# Patient Record
Sex: FEMALE | Race: BLACK OR AFRICAN AMERICAN | Employment: OTHER | URBAN - METROPOLITAN AREA
[De-identification: names, ages, dates, MRNs, and addresses within clinical notes are randomized per-mention and may not be internally consistent; named-entity substitution may affect disease eponyms.]

---

## 2019-01-03 ENCOUNTER — APPOINTMENT (OUTPATIENT)
Dept: GENERAL RADIOLOGY | Age: 69
DRG: 203 | End: 2019-01-03
Attending: EMERGENCY MEDICINE
Payer: COMMERCIAL

## 2019-01-03 ENCOUNTER — HOSPITAL ENCOUNTER (INPATIENT)
Age: 69
LOS: 3 days | Discharge: NURSING, REHAB OR LONG-TERM CARE | DRG: 203 | End: 2019-01-06
Attending: EMERGENCY MEDICINE | Admitting: NEUROMUSCULOSKELETAL MEDICINE & OMM
Payer: COMMERCIAL

## 2019-01-03 DIAGNOSIS — F14.10 COCAINE ABUSE (HCC): ICD-10-CM

## 2019-01-03 DIAGNOSIS — R07.89 ATYPICAL CHEST PAIN: Primary | ICD-10-CM

## 2019-01-03 PROBLEM — R07.9 CHEST PAIN: Status: ACTIVE | Noted: 2019-01-03

## 2019-01-03 LAB
AMPHET UR QL SCN: NEGATIVE
ANION GAP SERPL CALC-SCNC: 11 MMOL/L (ref 5–15)
ATRIAL RATE: 81 BPM
BARBITURATES UR QL SCN: NEGATIVE
BASOPHILS # BLD: 0 K/UL (ref 0–0.1)
BASOPHILS NFR BLD: 1 % (ref 0–1)
BENZODIAZ UR QL: NEGATIVE
BUN SERPL-MCNC: 16 MG/DL (ref 6–20)
BUN/CREAT SERPL: 17 (ref 12–20)
CALCIUM SERPL-MCNC: 9.3 MG/DL (ref 8.5–10.1)
CALCULATED P AXIS, ECG09: 72 DEGREES
CALCULATED R AXIS, ECG10: -6 DEGREES
CALCULATED T AXIS, ECG11: 52 DEGREES
CANNABINOIDS UR QL SCN: POSITIVE
CHLORIDE SERPL-SCNC: 106 MMOL/L (ref 97–108)
CHOLEST SERPL-MCNC: 264 MG/DL
CO2 SERPL-SCNC: 24 MMOL/L (ref 21–32)
COCAINE UR QL SCN: POSITIVE
CREAT SERPL-MCNC: 0.94 MG/DL (ref 0.55–1.02)
DIAGNOSIS, 93000: NORMAL
DIFFERENTIAL METHOD BLD: ABNORMAL
DRUG SCRN COMMENT,DRGCM: ABNORMAL
EOSINOPHIL # BLD: 0.2 K/UL (ref 0–0.4)
EOSINOPHIL NFR BLD: 4 % (ref 0–7)
ERYTHROCYTE [DISTWIDTH] IN BLOOD BY AUTOMATED COUNT: 14.6 % (ref 11.5–14.5)
ETHANOL SERPL-MCNC: <10 MG/DL
GLUCOSE SERPL-MCNC: 98 MG/DL (ref 65–100)
HCT VFR BLD AUTO: 34.3 % (ref 35–47)
HDLC SERPL-MCNC: 55 MG/DL
HDLC SERPL: 4.8 {RATIO} (ref 0–5)
HGB BLD-MCNC: 11 G/DL (ref 11.5–16)
IMM GRANULOCYTES # BLD: 0 K/UL (ref 0–0.04)
IMM GRANULOCYTES NFR BLD AUTO: 0 % (ref 0–0.5)
LDLC SERPL CALC-MCNC: 197.6 MG/DL (ref 0–100)
LIPID PROFILE,FLP: ABNORMAL
LYMPHOCYTES # BLD: 2.1 K/UL (ref 0.8–3.5)
LYMPHOCYTES NFR BLD: 36 % (ref 12–49)
MCH RBC QN AUTO: 23 PG (ref 26–34)
MCHC RBC AUTO-ENTMCNC: 32.1 G/DL (ref 30–36.5)
MCV RBC AUTO: 71.6 FL (ref 80–99)
METHADONE UR QL: NEGATIVE
MONOCYTES # BLD: 0.4 K/UL (ref 0–1)
MONOCYTES NFR BLD: 7 % (ref 5–13)
NEUTS SEG # BLD: 3 K/UL (ref 1.8–8)
NEUTS SEG NFR BLD: 53 % (ref 32–75)
NRBC # BLD: 0 K/UL (ref 0–0.01)
NRBC BLD-RTO: 0 PER 100 WBC
OPIATES UR QL: NEGATIVE
P-R INTERVAL, ECG05: 144 MS
PCP UR QL: NEGATIVE
PLATELET # BLD AUTO: 240 K/UL (ref 150–400)
PMV BLD AUTO: 9.5 FL (ref 8.9–12.9)
POTASSIUM SERPL-SCNC: 3 MMOL/L (ref 3.5–5.1)
Q-T INTERVAL, ECG07: 376 MS
QRS DURATION, ECG06: 74 MS
QTC CALCULATION (BEZET), ECG08: 436 MS
RBC # BLD AUTO: 4.79 M/UL (ref 3.8–5.2)
SODIUM SERPL-SCNC: 141 MMOL/L (ref 136–145)
TRIGL SERPL-MCNC: 57 MG/DL (ref ?–150)
TROPONIN I SERPL-MCNC: 0.07 NG/ML
VENTRICULAR RATE, ECG03: 81 BPM
VLDLC SERPL CALC-MCNC: 11.4 MG/DL
WBC # BLD AUTO: 5.8 K/UL (ref 3.6–11)

## 2019-01-03 PROCEDURE — 65660000000 HC RM CCU STEPDOWN

## 2019-01-03 PROCEDURE — 93005 ELECTROCARDIOGRAM TRACING: CPT

## 2019-01-03 PROCEDURE — 96374 THER/PROPH/DIAG INJ IV PUSH: CPT

## 2019-01-03 PROCEDURE — 74011250636 HC RX REV CODE- 250/636: Performed by: EMERGENCY MEDICINE

## 2019-01-03 PROCEDURE — 85025 COMPLETE CBC W/AUTO DIFF WBC: CPT

## 2019-01-03 PROCEDURE — 80061 LIPID PANEL: CPT

## 2019-01-03 PROCEDURE — 74011250636 HC RX REV CODE- 250/636: Performed by: NEUROMUSCULOSKELETAL MEDICINE & OMM

## 2019-01-03 PROCEDURE — 96361 HYDRATE IV INFUSION ADD-ON: CPT

## 2019-01-03 PROCEDURE — 71045 X-RAY EXAM CHEST 1 VIEW: CPT

## 2019-01-03 PROCEDURE — 36415 COLL VENOUS BLD VENIPUNCTURE: CPT

## 2019-01-03 PROCEDURE — 80307 DRUG TEST PRSMV CHEM ANLYZR: CPT

## 2019-01-03 PROCEDURE — 99284 EMERGENCY DEPT VISIT MOD MDM: CPT

## 2019-01-03 PROCEDURE — 74011250637 HC RX REV CODE- 250/637: Performed by: EMERGENCY MEDICINE

## 2019-01-03 PROCEDURE — 80048 BASIC METABOLIC PNL TOTAL CA: CPT

## 2019-01-03 PROCEDURE — 84484 ASSAY OF TROPONIN QUANT: CPT

## 2019-01-03 RX ORDER — LORAZEPAM 2 MG/ML
1 INJECTION INTRAMUSCULAR
Status: COMPLETED | OUTPATIENT
Start: 2019-01-03 | End: 2019-01-03

## 2019-01-03 RX ORDER — POTASSIUM CHLORIDE 1.5 G/1.77G
20 POWDER, FOR SOLUTION ORAL
Status: COMPLETED | OUTPATIENT
Start: 2019-01-03 | End: 2019-01-03

## 2019-01-03 RX ORDER — SODIUM CHLORIDE 0.9 % (FLUSH) 0.9 %
5-10 SYRINGE (ML) INJECTION AS NEEDED
Status: DISCONTINUED | OUTPATIENT
Start: 2019-01-03 | End: 2019-01-03 | Stop reason: SDUPTHER

## 2019-01-03 RX ORDER — HEPARIN SODIUM 5000 [USP'U]/ML
5000 INJECTION, SOLUTION INTRAVENOUS; SUBCUTANEOUS EVERY 12 HOURS
Status: DISCONTINUED | OUTPATIENT
Start: 2019-01-03 | End: 2019-01-06 | Stop reason: HOSPADM

## 2019-01-03 RX ORDER — ONDANSETRON 2 MG/ML
4 INJECTION INTRAMUSCULAR; INTRAVENOUS
Status: DISCONTINUED | OUTPATIENT
Start: 2019-01-03 | End: 2019-01-06 | Stop reason: HOSPADM

## 2019-01-03 RX ORDER — ENOXAPARIN SODIUM 100 MG/ML
40 INJECTION SUBCUTANEOUS EVERY 24 HOURS
Status: DISCONTINUED | OUTPATIENT
Start: 2019-01-03 | End: 2019-01-03 | Stop reason: ALTCHOICE

## 2019-01-03 RX ORDER — SODIUM CHLORIDE 0.9 % (FLUSH) 0.9 %
5-10 SYRINGE (ML) INJECTION EVERY 8 HOURS
Status: DISCONTINUED | OUTPATIENT
Start: 2019-01-03 | End: 2019-01-06 | Stop reason: HOSPADM

## 2019-01-03 RX ORDER — MAG HYDROX/ALUMINUM HYD/SIMETH 200-200-20
30 SUSPENSION, ORAL (FINAL DOSE FORM) ORAL
Status: DISCONTINUED | OUTPATIENT
Start: 2019-01-03 | End: 2019-01-06 | Stop reason: HOSPADM

## 2019-01-03 RX ORDER — SODIUM CHLORIDE 0.9 % (FLUSH) 0.9 %
5-10 SYRINGE (ML) INJECTION AS NEEDED
Status: DISCONTINUED | OUTPATIENT
Start: 2019-01-03 | End: 2019-01-06 | Stop reason: HOSPADM

## 2019-01-03 RX ORDER — GUAIFENESIN 100 MG/5ML
81 LIQUID (ML) ORAL DAILY
Status: DISCONTINUED | OUTPATIENT
Start: 2019-01-04 | End: 2019-01-06 | Stop reason: HOSPADM

## 2019-01-03 RX ORDER — MORPHINE SULFATE 4 MG/ML
4 INJECTION INTRAVENOUS
Status: DISCONTINUED | OUTPATIENT
Start: 2019-01-03 | End: 2019-01-06 | Stop reason: HOSPADM

## 2019-01-03 RX ORDER — SODIUM CHLORIDE 9 MG/ML
150 INJECTION, SOLUTION INTRAVENOUS CONTINUOUS
Status: DISCONTINUED | OUTPATIENT
Start: 2019-01-03 | End: 2019-01-05

## 2019-01-03 RX ORDER — ACETAMINOPHEN 325 MG/1
650 TABLET ORAL
Status: DISCONTINUED | OUTPATIENT
Start: 2019-01-03 | End: 2019-01-06 | Stop reason: HOSPADM

## 2019-01-03 RX ORDER — SODIUM CHLORIDE 0.9 % (FLUSH) 0.9 %
5-10 SYRINGE (ML) INJECTION EVERY 8 HOURS
Status: DISCONTINUED | OUTPATIENT
Start: 2019-01-03 | End: 2019-01-03 | Stop reason: SDUPTHER

## 2019-01-03 RX ADMIN — Medication 10 ML: at 20:48

## 2019-01-03 RX ADMIN — HEPARIN SODIUM 5000 UNITS: 5000 INJECTION INTRAVENOUS; SUBCUTANEOUS at 20:48

## 2019-01-03 RX ADMIN — LORAZEPAM 1 MG: 2 INJECTION, SOLUTION INTRAMUSCULAR; INTRAVENOUS at 12:42

## 2019-01-03 RX ADMIN — POTASSIUM CHLORIDE 20 MEQ: 1.5 POWDER, FOR SOLUTION ORAL at 13:22

## 2019-01-03 RX ADMIN — SODIUM CHLORIDE 150 ML/HR: 900 INJECTION, SOLUTION INTRAVENOUS at 17:42

## 2019-01-03 RX ADMIN — HEPARIN SODIUM 5000 UNITS: 5000 INJECTION INTRAVENOUS; SUBCUTANEOUS at 14:08

## 2019-01-03 RX ADMIN — SODIUM CHLORIDE 150 ML/HR: 900 INJECTION, SOLUTION INTRAVENOUS at 12:42

## 2019-01-03 NOTE — H&P
History and Physical 
 
Subjective:  
 
Manuela Pack is a 79 y.o.  female who presents with chest pain since 10 am this morning. Onset of symptoms was abrupt with unchanged course since that time. The pain is located left side of chest. Patient describes the pain as continuous and rated as moderate. Patient denies any sob. Symptoms are aggravated by koolaide. Symptoms improve with rest.  Previous studies include troponin - 0.07 Past Medical History:  
Diagnosis Date  Anxiety  Hypertension  Stroke ([facility] [address] 1970 History reviewed. No pertinent surgical history. History reviewed. No pertinent family history. Social History Tobacco Use  Smoking status: Current Every Day Smoker  Smokeless tobacco: Never Used Substance Use Topics  Alcohol use: Yes Frequency: Never Prior to Admission medications Medication Sig Start Date End Date Taking? Authorizing Provider  
aripiprazole (ABILIFY PO) Take  by mouth. Other, MD Ayala  
 
No Known Allergies Review of Systems: A comprehensive review of systems was negative except for that written in the History of Present Illness. Objective: Intake and Output:   
No intake/output data recorded. No intake/output data recorded. Physical Exam:  
Visit Vitals /78 (BP 1 Location: Right arm, BP Patient Position: At rest) Pulse 82 Temp 98.5 °F (36.9 °C) Resp 19 Ht 5' 5\" (1.651 m) Wt 54.4 kg (120 lb) SpO2 99% BMI 19.97 kg/m² General:  Alert, cooperative, falls asleep regularly Head:  Normocephalic, without obvious abnormality, atraumatic. Eyes:  Conjunctivae/corneas clear. PERRL, EOMs intact. Fundi benign. Ears:  Normal TMs and external ear canals both ears. Nose: Nares normal. Septum midline. Mucosa normal. No drainage or sinus tenderness.   
Throat: Lips, mucosa, and tongue normal. Teeth and gums normal.  
 Neck: Supple, symmetrical, trachea midline, no adenopathy, thyroid: no enlargement/tenderness/nodules, no carotid bruit and no JVD. Back:   Symmetric, no curvature. ROM normal. No CVA tenderness. Lungs:   Clear to auscultation bilaterally. Chest wall:  No tenderness or deformity. Heart:  Regular rate and rhythm, S1, S2 normal, no murmur, click, rub or gallop. Extremities: Extremities normal, atraumatic, no cyanosis or edema. Pulses: 2+ and symmetric all extremities. Skin: Skin color, texture, turgor normal. No rashes or lesions. Lymph nodes: Cervical, supraclavicular, and axillary nodes normal.  
Neurologic: CNII-XII intact. Normal strength, sensation and reflexes throughout. ECG:  Nsr, anteroseptal infarct Data Review:  
Recent Results (from the past 24 hour(s)) CBC WITH AUTOMATED DIFF Collection Time: 01/03/19 12:20 PM  
Result Value Ref Range WBC 5.8 3.6 - 11.0 K/uL  
 RBC 4.79 3.80 - 5.20 M/uL  
 HGB 11.0 (L) 11.5 - 16.0 g/dL HCT 34.3 (L) 35.0 - 47.0 % MCV 71.6 (L) 80.0 - 99.0 FL  
 MCH 23.0 (L) 26.0 - 34.0 PG  
 MCHC 32.1 30.0 - 36.5 g/dL  
 RDW 14.6 (H) 11.5 - 14.5 % PLATELET 985 205 - 171 K/uL MPV 9.5 8.9 - 12.9 FL  
 NRBC 0.0 0  WBC ABSOLUTE NRBC 0.00 0.00 - 0.01 K/uL NEUTROPHILS 53 32 - 75 % LYMPHOCYTES 36 12 - 49 % MONOCYTES 7 5 - 13 % EOSINOPHILS 4 0 - 7 % BASOPHILS 1 0 - 1 % IMMATURE GRANULOCYTES 0 0.0 - 0.5 % ABS. NEUTROPHILS 3.0 1.8 - 8.0 K/UL  
 ABS. LYMPHOCYTES 2.1 0.8 - 3.5 K/UL  
 ABS. MONOCYTES 0.4 0.0 - 1.0 K/UL  
 ABS. EOSINOPHILS 0.2 0.0 - 0.4 K/UL  
 ABS. BASOPHILS 0.0 0.0 - 0.1 K/UL  
 ABS. IMM. GRANS. 0.0 0.00 - 0.04 K/UL  
 DF AUTOMATED METABOLIC PANEL, BASIC Collection Time: 01/03/19 12:20 PM  
Result Value Ref Range Sodium 141 136 - 145 mmol/L Potassium 3.0 (L) 3.5 - 5.1 mmol/L Chloride 106 97 - 108 mmol/L  
 CO2 24 21 - 32 mmol/L  Anion gap 11 5 - 15 mmol/L  
 Glucose 98 65 - 100 mg/dL BUN 16 6 - 20 MG/DL Creatinine 0.94 0.55 - 1.02 MG/DL  
 BUN/Creatinine ratio 17 12 - 20 GFR est AA >60 >60 ml/min/1.73m2 GFR est non-AA 60 (L) >60 ml/min/1.73m2 Calcium 9.3 8.5 - 10.1 MG/DL  
ETHYL ALCOHOL Collection Time: 01/03/19 12:20 PM  
Result Value Ref Range ALCOHOL(ETHYL),SERUM <10 <10 MG/DL  
TROPONIN I Collection Time: 01/03/19 12:20 PM  
Result Value Ref Range Troponin-I, Qt. 0.07 (H) <0.05 ng/mL EKG, 12 LEAD, INITIAL Collection Time: 01/03/19 12:48 PM  
Result Value Ref Range Ventricular Rate 81 BPM  
 Atrial Rate 81 BPM  
 P-R Interval 144 ms QRS Duration 74 ms Q-T Interval 376 ms QTC Calculation (Bezet) 436 ms Calculated P Axis 72 degrees Calculated R Axis -6 degrees Calculated T Axis 52 degrees Diagnosis Normal sinus rhythm with sinus arrhythmia Anteroseptal infarct , age undetermined Abnormal ECG No previous ECGs available Chest x-ray was negative for infiltrate, effusion, pneumothorax, or wide mediastinum. Assessment:  
 
Active Problems: 
  Chest pain (1/3/2019) 
 
hypokalemia, ? Stroke hx ( no deficits) Plan:  
 
Check troponin in am. 
Check lipids, start on aspirin. Cardiology consult Cardiac diet. Give 40meq po kcl Full code Signed By: Court Richter DO   
 January 3, 2019

## 2019-01-03 NOTE — PROGRESS NOTES
CM reviewed chart. CM met with pt, spoke with pt boyfriend briefly, was celso to answer the basic questions. Pt sleeping- did not wake up to complete assessment. Boyfriend added his phone number [phone] [employer] [facility] MSW, [place]

## 2019-01-03 NOTE — ED NOTES
TRANSFER - OUT REPORT: 
 
Verbal report given to Remi Conklin RN(name) on [facility]  being transferred to [facility](unit) for routine progression of care Report consisted of patients Situation, Background, Assessment and  
Recommendations(SBAR). Information from the following report(s) SBAR was reviewed with the receiving nurse. Lines:  
Peripheral IV 01/03/19 Right Antecubital (Active) Site Assessment Clean, dry, & intact 1/3/2019 12:16 PM  
Phlebitis Assessment 0 1/3/2019 12:16 PM  
Infiltration Assessment 0 1/3/2019 12:16 PM  
Dressing Status Clean, dry, & intact 1/3/2019 12:16 PM  
Dressing Type Transparent 1/3/2019 12:16 PM  
Hub Color/Line Status Patent 1/3/2019 12:16 PM  
Action Taken Blood drawn 1/3/2019 12:16 PM  
  
 
Opportunity for questions and clarification was provided.    
 
Patient transported with: 
 Monitor and RN

## 2019-01-03 NOTE — PROGRESS NOTES
[facility] Dosing Services Automatic adjustment of enoxaparin Dr. Ziyad Dale Indication: VTE prophylaxis Wt Readings from Last 1 Encounters:  
01/03/19 54.4 kg (120 lb) Ht Readings from Last 1 Encounters:  
01/03/19 165.1 cm (65\") Pharmacist made change to enoxaparin therapy based on weight < 60 kg Order changed to heparin 5000 units SUBCUT every 12 hours. Previous Dose Enoxaparin 40m g SUBCUT every 24 hours Creatinine Clearance Estimated Creatinine Clearance: 50.6 mL/min (based on SCr of 0.94 mg/dL). Creatinine Lab Results Component Value Date/Time Creatinine 0.94 01/03/2019 12:20 PM  
   
Platelet Lab Results Component Value Date/Time PLATELET 476 86/32/7468 12:20 PM  
  
H/H Lab Results Component Value Date/Time HGB 11.0 (L) 01/03/2019 12:20 PM  
  
 
Epidural Catheter? No 
Other anticoagulants: No 
Relevant drug interactions: None ? Pharmacy to automatically make dose adjustment for renal dysfunction (creatinine clearance less than 30 mL/min) ? Pharmacy to automatically make dose adjustment for obesity (BMI greater than 40) or low weight (heparin for wt < 60 kg) ? Pharmacy to make dose rounding adjustments per [facility] dose adjustment scale. Pharmacy will monitor patients progress, make dose adjustment as needed per changing renal function, and communicate further recommendations regarding patients anticoagulation therapy with prescriber. Thank you, Zehra Poole, PharmD, BCPS 
[phone]

## 2019-01-03 NOTE — ED NOTES
Emergency Department Nursing Plan of Care The Nursing Plan of Care is developed from the Nursing assessment and Emergency Department Attending provider initial evaluation. The plan of care may be reviewed in the ED Provider note. The Plan of Care was developed with the following considerations:  
Patient / Family readiness to learn indicated by:verbalized understanding Persons(s) to be included in education: patient Barriers to Learning/Limitations:No 
 
Signed Joana Dempsey RN   
1/3/2019   2:18 PM

## 2019-01-03 NOTE — ED PROVIDER NOTES
EMERGENCY DEPARTMENT HISTORY AND PHYSICAL EXAM 
 
 
Date: 1/3/2019 Patient Name: Alvaro Lora History of Presenting Illness Chief Complaint Patient presents with  Chest Pain  Anxiety History Provided By: Patient HPI: Alvaro Lora, 79 y.o. female with PMHx significant for anxiety, HTN, stroke, and AAA, presents via EMS to the ED with cc of sudden onset sharp chest pain beginning this morning. EMS states upon arrival pt was hyperventilating and tachycardic (rate 120's). Per EMS, all other vitals including BP and BG were normal. They report her 12 lead was unremarkable. Per EMS, on auscultation there was some audible transmitted upper airway sounds, but lungs were otherwise clear. EMS endorses giving 324 ASA en route and upon ED arrival pt's heart rate improved to the 80's. While in the ED pt reports she is still having sharp chest pain with mild SOB and states current sx's are c/w anxiety. She states sx's began this morning after eating breakfast. She does admit to recent cocaine and EtOH use. Pt specifically denies any nausea. There are no other complaints, changes, or physical findings at this time. PCP: None No current facility-administered medications on file prior to encounter. Current Outpatient Medications on File Prior to Encounter Medication Sig Dispense Refill  aripiprazole (ABILIFY PO) Take  by mouth. Past History Past Medical History: 
Past Medical History:  
Diagnosis Date  Anxiety  Hypertension  Stroke ([address] 1970 Past Surgical History: 
History reviewed. No pertinent surgical history. Family History: 
History reviewed. No pertinent family history. Social History: 
Social History Tobacco Use  Smoking status: Current Every Day Smoker  Smokeless tobacco: Never Used Substance Use Topics  Alcohol use: Yes Frequency: Never  Drug use: Yes Types: Cocaine, Marijuana Allergies: No Known Allergies Review of Systems Review of Systems Constitutional: Negative for fever. HENT: Negative for sore throat. Eyes: Negative for photophobia and redness. Respiratory: Positive for shortness of breath. Negative for wheezing. Cardiovascular: Positive for chest pain. Negative for leg swelling. Gastrointestinal: Negative for abdominal pain, blood in stool, nausea and vomiting. Genitourinary: Negative for difficulty urinating, dysuria, hematuria, menstrual problem and vaginal bleeding. Musculoskeletal: Negative for back pain and joint swelling. Neurological: Negative for dizziness, seizures, syncope, speech difficulty, weakness, numbness and headaches. Hematological: Negative for adenopathy. Psychiatric/Behavioral: Negative for agitation, confusion and suicidal ideas. The patient is nervous/anxious. Physical Exam  
Physical Exam  
Constitutional: She is oriented to person, place, and time. She appears well-developed and well-nourished. No distress. HENT:  
Head: Normocephalic and atraumatic. Mouth/Throat: Oropharynx is clear and moist. No oropharyngeal exudate. Eyes: Conjunctivae and EOM are normal. Pupils are equal, round, and reactive to light. Left eye exhibits no discharge. Neck: Normal range of motion. Neck supple. No JVD present. Cardiovascular: Normal rate, regular rhythm, normal heart sounds and intact distal pulses. Pulmonary/Chest: Effort normal and breath sounds normal. No respiratory distress. She has no wheezes. Abdominal: Soft. Bowel sounds are normal. She exhibits no distension. There is no tenderness. There is no rebound and no guarding. Musculoskeletal: Normal range of motion. She exhibits no edema or tenderness. Lymphadenopathy:  
  She has no cervical adenopathy. Neurological: She is alert and oriented to person, place, and time. She has normal reflexes. No cranial nerve deficit. Skin: Skin is warm and dry. No rash noted. Psychiatric: Her behavior is normal. Her mood appears anxious. Nursing note and vitals reviewed. Diagnostic Study Results Labs - Recent Results (from the past 12 hour(s)) CBC WITH AUTOMATED DIFF Collection Time: 01/03/19 12:20 PM  
Result Value Ref Range WBC 5.8 3.6 - 11.0 K/uL  
 RBC 4.79 3.80 - 5.20 M/uL  
 HGB 11.0 (L) 11.5 - 16.0 g/dL HCT 34.3 (L) 35.0 - 47.0 % MCV 71.6 (L) 80.0 - 99.0 FL  
 MCH 23.0 (L) 26.0 - 34.0 PG  
 MCHC 32.1 30.0 - 36.5 g/dL  
 RDW 14.6 (H) 11.5 - 14.5 % PLATELET 349 191 - 899 K/uL MPV 9.5 8.9 - 12.9 FL  
 NRBC 0.0 0  WBC ABSOLUTE NRBC 0.00 0.00 - 0.01 K/uL NEUTROPHILS 53 32 - 75 % LYMPHOCYTES 36 12 - 49 % MONOCYTES 7 5 - 13 % EOSINOPHILS 4 0 - 7 % BASOPHILS 1 0 - 1 % IMMATURE GRANULOCYTES 0 0.0 - 0.5 % ABS. NEUTROPHILS 3.0 1.8 - 8.0 K/UL  
 ABS. LYMPHOCYTES 2.1 0.8 - 3.5 K/UL  
 ABS. MONOCYTES 0.4 0.0 - 1.0 K/UL  
 ABS. EOSINOPHILS 0.2 0.0 - 0.4 K/UL  
 ABS. BASOPHILS 0.0 0.0 - 0.1 K/UL  
 ABS. IMM. GRANS. 0.0 0.00 - 0.04 K/UL  
 DF AUTOMATED METABOLIC PANEL, BASIC Collection Time: 01/03/19 12:20 PM  
Result Value Ref Range Sodium 141 136 - 145 mmol/L Potassium 3.0 (L) 3.5 - 5.1 mmol/L Chloride 106 97 - 108 mmol/L  
 CO2 24 21 - 32 mmol/L Anion gap 11 5 - 15 mmol/L Glucose 98 65 - 100 mg/dL BUN 16 6 - 20 MG/DL Creatinine 0.94 0.55 - 1.02 MG/DL  
 BUN/Creatinine ratio 17 12 - 20 GFR est AA >60 >60 ml/min/1.73m2 GFR est non-AA 60 (L) >60 ml/min/1.73m2 Calcium 9.3 8.5 - 10.1 MG/DL  
ETHYL ALCOHOL Collection Time: 01/03/19 12:20 PM  
Result Value Ref Range ALCOHOL(ETHYL),SERUM <10 <10 MG/DL  
TROPONIN I Collection Time: 01/03/19 12:20 PM  
Result Value Ref Range Troponin-I, Qt. 0.07 (H) <0.05 ng/mL EKG, 12 LEAD, INITIAL Collection Time: 01/03/19 12:48 PM  
Result Value Ref Range Ventricular Rate 81 BPM  
 Atrial Rate 81 BPM  
 P-R Interval 144 ms QRS Duration 74 ms Q-T Interval 376 ms QTC Calculation (Bezet) 436 ms Calculated P Axis 72 degrees Calculated R Axis -6 degrees Calculated T Axis 52 degrees Diagnosis Normal sinus rhythm with sinus arrhythmia Anteroseptal infarct , age undetermined Abnormal ECG No previous ECGs available Radiologic Studies - CXR Results  (Last 48 hours) 01/03/19 1233  XR CHEST PORT Final result Impression:  IMPRESSION:  
Bibasilar minimal atelectasis and interstitial prominence. No focal infiltrate  
or CHF pattern. .  . Narrative:  INDICATION:  chest pain EXAM: Chest single view. COMPARISON: None. Natanell Syed FINDINGS: A single frontal view of the chest at 1920 hours shows mild  
interstitial prominence without infiltrate or effusion. There may be mild  
bibasilar atelectasis. .  The heart, mediastinum and pulmonary vasculature are  
stable . The bony thorax is unremarkable for age. .  
   
  
  
 
Medical Decision Making I am the first provider for this patient. I reviewed the vital signs, available nursing notes, past medical history, past surgical history, family history and social history. Vital Signs-Reviewed the patient's vital signs. Patient Vitals for the past 12 hrs: 
 Temp Pulse Resp BP SpO2  
01/03/19 1213 98.5 °F (36.9 °C) 82 19 138/78 99 % 01/03/19 1212     99 % 01/03/19 [place] [address]70 Pulse Oximetry Analysis - 99% on RA Cardiac Monitor:  
Rate: 82 bpm 
Rhythm: Normal Sinus Rhythm EKG interpretation: (Preliminary) 1248 Rhythm: normal sinus rhythm; and regular . Rate (approx.): 81; Axis: normal; SC interval: 144; QRS interval: 74; ST/T wave: normal 
Written by LATISHA Hale, as dictated by David Gonsalez MD. Records Reviewed: Nursing Notes, Old Medical Records and Ambulance Run Sheet Provider Notes (Medical Decision Making): DDx: hyperventilation, anxiety, substance abuse, cocaine induced chest pain, alcohol abuse ED Course:  
Initial assessment performed. The patients presenting problems have been discussed, and they are in agreement with the care plan formulated and outlined with them. I have encouraged them to ask questions as they arise throughout their visit. CONSULT NOTE:  
1:09 Delbert Lombard, MD spoke with Edelmira Rodriguez MD 
Specialty: cardiology Discussed pt's hx, disposition, and available diagnostic and imaging results. Reviewed care plans. Consultant agrees with plans as outlined. Dr. Alejandra Haq will come evaluate pt. Written by Bear Wolfe ED Scribe, as dictated by Dusty Page MD. 
 
PROGRESS NOTE: 
1:18 PM 
Dr. Alejandra Haq has evaluated pt and feels she warrants admission given her chest pain, elevated troponin, and hx of cocaine use. Admit to hospitalist and he will consult. Written by LATISHA Asheribe, as dictated by Dusty Page MD 
 
CONSULT NOTE:  
1:24 Delbert Lombard, MD spoke with Lyle Conde MD  
Specialty: Hospitalist 
Discussed pt's hx, disposition, and available diagnostic and imaging results. Reviewed care plans. Consultant will evaluate pt for admission. Written by LATISHA Asheribe, as dictated by Dusty Page MD. 
 
CRITICAL CARE NOTE : 
 
1:18 PM 
 
IMPENDING DETERIORATION -Cardiovascular and Metabolic ASSOCIATED RISK FACTORS - Dysrhythmia and Metabolic changes MANAGEMENT- Bedside Assessment and Supervision of Care INTERPRETATION -  Xrays, ECG and Blood Pressure INTERVENTIONS - hemodynamic mngmt and Metobolic interventions CASE REVIEW - Hospitalist, Medical Sub-Specialist, Nursing and Family TREATMENT RESPONSE -Stable PERFORMED BY - Self NOTES   : 
 
I have spent 35 minutes of critical care time involved in lab review, consultations with specialist, family decision- making, bedside attention and documentation. During this entire length of time I was immediately available to the patient . Dusty Page MD 
 
Disposition: ADMIT NOTE: 
1:25 PM 
The patient is being admitted to the hospital by Rosalinda Morillo MD.  The results of their tests and reasons for their admission have been discussed with the patient and/or available family. They convey agreement and understanding for the need to be admitted and for their admission diagnosis. PLAN: 
1. Admit to hospitalist  
 
Diagnosis Clinical Impression: 1. Atypical chest pain 2. Cocaine abuse ([address] Attestations: This note is prepared by Anay Reyes, acting as Scribe for Vladimir Mace MD. Vladimir Mace MD: The scribe's documentation has been prepared under my direction and personally reviewed by me in its entirety. I confirm that the note above accurately reflects all work, treatment, procedures, and medical decision making performed by me.

## 2019-01-04 ENCOUNTER — APPOINTMENT (OUTPATIENT)
Dept: ULTRASOUND IMAGING | Age: 69
DRG: 203 | End: 2019-01-04
Attending: INTERNAL MEDICINE
Payer: COMMERCIAL

## 2019-01-04 LAB
CK MB CFR SERPL CALC: 4.1 % (ref 0–2.5)
CK MB SERPL-MCNC: 2.6 NG/ML (ref 5–25)
CK SERPL-CCNC: 64 U/L (ref 26–192)
TROPONIN I SERPL-MCNC: 0.31 NG/ML

## 2019-01-04 PROCEDURE — 74011250636 HC RX REV CODE- 250/636: Performed by: EMERGENCY MEDICINE

## 2019-01-04 PROCEDURE — 99218 HC RM OBSERVATION: CPT

## 2019-01-04 PROCEDURE — 93306 TTE W/DOPPLER COMPLETE: CPT

## 2019-01-04 PROCEDURE — 82550 ASSAY OF CK (CPK): CPT

## 2019-01-04 PROCEDURE — 74011250637 HC RX REV CODE- 250/637: Performed by: NEUROMUSCULOSKELETAL MEDICINE & OMM

## 2019-01-04 PROCEDURE — 65270000029 HC RM PRIVATE

## 2019-01-04 PROCEDURE — 36415 COLL VENOUS BLD VENIPUNCTURE: CPT

## 2019-01-04 PROCEDURE — 93005 ELECTROCARDIOGRAM TRACING: CPT

## 2019-01-04 PROCEDURE — 84484 ASSAY OF TROPONIN QUANT: CPT

## 2019-01-04 PROCEDURE — 74011250636 HC RX REV CODE- 250/636: Performed by: NEUROMUSCULOSKELETAL MEDICINE & OMM

## 2019-01-04 PROCEDURE — 76770 US EXAM ABDO BACK WALL COMP: CPT

## 2019-01-04 RX ADMIN — ASPIRIN 81 MG 81 MG: 81 TABLET ORAL at 08:04

## 2019-01-04 RX ADMIN — SODIUM CHLORIDE 150 ML/HR: 900 INJECTION, SOLUTION INTRAVENOUS at 01:07

## 2019-01-04 RX ADMIN — HEPARIN SODIUM 5000 UNITS: 5000 INJECTION INTRAVENOUS; SUBCUTANEOUS at 21:12

## 2019-01-04 RX ADMIN — Medication 10 ML: at 16:01

## 2019-01-04 RX ADMIN — HEPARIN SODIUM 5000 UNITS: 5000 INJECTION INTRAVENOUS; SUBCUTANEOUS at 08:04

## 2019-01-04 NOTE — PROGRESS NOTES
Cardiology: 
 
EKG review for [facility] shows persistence of the downward convex ST elevation most prominent in V2 where the R wave amplitude Is very low. She also has peaked T waves across the precordium. There is slight MO segment depression isolated to V3. There is no pattern of acute injury. It does not appear that the ordered repeat troponin was not done. Initially it was abnormal at 0.07. CK is low. Renal function is normal.  
 
She is more alert and more able to provide history today. She admits to cocaine consumption as a long term problem and expresses a wish to be rehabilitated. She describes her pain as sharp and left parasternal, but identical to what she experienced \"when she had her heart attack\". She is pain free today but she is short of breath simply crossing the room to go to the bathroom. She complained of shortness of breath while talking with me as well, with relief if she is still and quiet. On exam, she has variable JV distention bilaterally even seated, constant when supine. She denies postural change in her chest pain. She denies orthopnea and seems most comfortable in lateral decubitus. Carotids are silent. Breath sounds are harsh with otherwise clear lung on the left,. The right is not well ventilated; unclear if this has to do with cooperation or not. She seems a bit manic, almost unable to stop talking for more than a few seconds at a time. BP is 122/96; elevated diastolics have been common for her last 24 hours. She is complaining of constant aching pain RLE. The legs are atrophic with nonpalpable pulses but warm. There is no edema. Abdomen is soft and nontender. Right side of oral musculature is lax. Echo shows severe concentric LVH with diastolic impairment and with no focal abnormalities of wall motion at all. The aortic valve is sclerotic but not stenotic. 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 The absence of focal findings makes angiography far less imperative. Plan:                 She will need to be started on a calcium channel blocker.  
 
Sonia Snyder MD [facility]

## 2019-01-04 NOTE — PROGRESS NOTES
Spiritual Care Partner Volunteer visited patient in Med Surg at [facility] on 1/4/19. Documented by: 
Rev. Kd Fernandez M.Div, [facility] Lead

## 2019-01-04 NOTE — PROGRESS NOTES
Bedside shift change report given to Atul Trimble RN (oncoming nurse) by Remi Conklin RN (offgoing nurse). Report included the following information SBAR.

## 2019-01-04 NOTE — PROGRESS NOTES
Hospitalist Progress Note Subjective:  
Daily Progress Note: 2019 12:11 PM 
 
No chest pain today. Ambulating without difficulty. Current Facility-Administered Medications Medication Dose Route Frequency  sodium chloride (NS) flush 5-10 mL  5-10 mL IntraVENous Q8H  
 sodium chloride (NS) flush 5-10 mL  5-10 mL IntraVENous PRN  
 0.9% sodium chloride infusion  150 mL/hr IntraVENous CONTINUOUS  
 aspirin chewable tablet 81 mg  81 mg Oral DAILY  morphine injection 4 mg  4 mg IntraVENous Q4H PRN  
 heparin (porcine) injection 5,000 Units  5,000 Units SubCUTAneous Q12H  
 acetaminophen (TYLENOL) tablet 650 mg  650 mg Oral Q6H PRN  
 ondansetron (ZOFRAN) injection 4 mg  4 mg IntraVENous Q6H PRN  
 alum-mag hydroxide-simeth (MYLANTA) oral suspension 30 mL  30 mL Oral Q4H PRN Review of Systems A comprehensive review of systems was negative except for that written in the HPI. Objective:  
 
Visit Vitals BP (!) 128/93 Pulse 88 Temp 97.6 °F (36.4 °C) Resp 16 Ht 5' 5\" (1.651 m) Wt 54.3 kg (119 lb 12.8 oz) SpO2 99% BMI 19.94 kg/m² O2 Device: Room air Temp (24hrs), Av.1 °F (36.7 °C), Min:97.6 °F (36.4 °C), Max:98.5 °F (36.9 °C) No intake/output data recorded.  1901 -  0700 In: 750 [I.V.:750] Out: 300 [Urine:300] Visit Vitals BP (!) 128/93 Pulse 88 Temp 97.6 °F (36.4 °C) Resp 16 Ht 5' 5\" (1.651 m) Wt 54.3 kg (119 lb 12.8 oz) SpO2 99% BMI 19.94 kg/m² General:  Alert, cooperative, no distress, appears stated age. Head:  Normocephalic, without obvious abnormality, atraumatic. Eyes:  Conjunctivae/corneas clear. PERRL, EOMs intact. Fundi benign. Ears:  Normal TMs and external ear canals both ears. Nose: Nares normal. Septum midline. Mucosa normal. No drainage or sinus tenderness.   
Throat: Lips, mucosa, and tongue normal. Teeth and gums normal.  
Neck: Supple, symmetrical, trachea midline, no adenopathy, thyroid: no enlargement/tenderness/nodules, no carotid bruit and no JVD. Back:   Symmetric, no curvature. ROM normal. No CVA tenderness. Lungs:   Clear to auscultation bilaterally. Chest wall:  No tenderness or deformity. Heart:  Regular rate and rhythm, S1, S2 normal, no murmur, click, rub or gallop. Breast Exam:  No tenderness, masses, or nipple abnormality. Abdomen:   Soft, non-tender. Bowel sounds normal. No masses,  No organomegaly. Genitalia:  Normal female without lesion, discharge or tenderness. Rectal:  Normal tone,  no masses or tenderness Guaiac negative stool. Extremities: Extremities normal, atraumatic, no cyanosis or edema. Pulses: 2+ and symmetric all extremities. Skin: Skin color, texture, turgor normal. No rashes or lesions. Lymph nodes: Cervical, supraclavicular, and axillary nodes normal.  
Neurologic: CNII-XII intact. Normal strength, sensation and reflexes throughout. Additional comments:I reviewed the patient's new clinical lab test results. abd us wnl. Data Review Recent Results (from the past 24 hour(s)) CBC WITH AUTOMATED DIFF Collection Time: 01/03/19 12:20 PM  
Result Value Ref Range WBC 5.8 3.6 - 11.0 K/uL  
 RBC 4.79 3.80 - 5.20 M/uL  
 HGB 11.0 (L) 11.5 - 16.0 g/dL HCT 34.3 (L) 35.0 - 47.0 % MCV 71.6 (L) 80.0 - 99.0 FL  
 MCH 23.0 (L) 26.0 - 34.0 PG  
 MCHC 32.1 30.0 - 36.5 g/dL  
 RDW 14.6 (H) 11.5 - 14.5 % PLATELET 280 404 - 814 K/uL MPV 9.5 8.9 - 12.9 FL  
 NRBC 0.0 0  WBC ABSOLUTE NRBC 0.00 0.00 - 0.01 K/uL NEUTROPHILS 53 32 - 75 % LYMPHOCYTES 36 12 - 49 % MONOCYTES 7 5 - 13 % EOSINOPHILS 4 0 - 7 % BASOPHILS 1 0 - 1 % IMMATURE GRANULOCYTES 0 0.0 - 0.5 % ABS. NEUTROPHILS 3.0 1.8 - 8.0 K/UL  
 ABS. LYMPHOCYTES 2.1 0.8 - 3.5 K/UL  
 ABS. MONOCYTES 0.4 0.0 - 1.0 K/UL  
 ABS. EOSINOPHILS 0.2 0.0 - 0.4 K/UL  
 ABS. BASOPHILS 0.0 0.0 - 0.1 K/UL  
 ABS. IMM. GRANS. 0.0 0.00 - 0.04 K/UL  
 DF AUTOMATED METABOLIC PANEL, BASIC Collection Time: 01/03/19 12:20 PM  
Result Value Ref Range Sodium 141 136 - 145 mmol/L Potassium 3.0 (L) 3.5 - 5.1 mmol/L Chloride 106 97 - 108 mmol/L  
 CO2 24 21 - 32 mmol/L Anion gap 11 5 - 15 mmol/L Glucose 98 65 - 100 mg/dL BUN 16 6 - 20 MG/DL Creatinine 0.94 0.55 - 1.02 MG/DL  
 BUN/Creatinine ratio 17 12 - 20 GFR est AA >60 >60 ml/min/1.73m2 GFR est non-AA 60 (L) >60 ml/min/1.73m2 Calcium 9.3 8.5 - 10.1 MG/DL  
ETHYL ALCOHOL Collection Time: 01/03/19 12:20 PM  
Result Value Ref Range ALCOHOL(ETHYL),SERUM <10 <10 MG/DL  
TROPONIN I Collection Time: 01/03/19 12:20 PM  
Result Value Ref Range Troponin-I, Qt. 0.07 (H) <0.05 ng/mL LIPID PANEL Collection Time: 01/03/19 12:20 PM  
Result Value Ref Range LIPID PROFILE Cholesterol, total 264 (H) <200 MG/DL Triglyceride 57 <150 MG/DL  
 HDL Cholesterol 55 MG/DL  
 LDL, calculated 197.6 (H) 0 - 100 MG/DL VLDL, calculated 11.4 MG/DL  
 CHOL/HDL Ratio 4.8 0 - 5.0 EKG, 12 LEAD, INITIAL Collection Time: 01/03/19 12:48 PM  
Result Value Ref Range Ventricular Rate 81 BPM  
 Atrial Rate 81 BPM  
 P-R Interval 144 ms QRS Duration 74 ms Q-T Interval 376 ms QTC Calculation (Bezet) 436 ms Calculated P Axis 72 degrees Calculated R Axis -6 degrees Calculated T Axis 52 degrees Diagnosis Normal sinus rhythm with sinus arrhythmia Anteroseptal infarct , age undetermined Abnormal ECG No previous ECGs available Confirmed by Genna Mohr MD, Junnie Boast ([id number]) on 1/3/2019 11:05:21 PM 
  
DRUG SCREEN, URINE Collection Time: 01/03/19  3:18 PM  
Result Value Ref Range AMPHETAMINES NEGATIVE  NEG    
 BARBITURATES NEGATIVE  NEG BENZODIAZEPINES NEGATIVE  NEG    
 COCAINE POSITIVE (A) NEG METHADONE NEGATIVE  NEG    
 OPIATES NEGATIVE  NEG    
 PCP(PHENCYCLIDINE) NEGATIVE  NEG    
 THC (TH-CANNABINOL) POSITIVE (A) NEG Drug screen comment (NOTE) CK W/ CKMB & INDEX Collection Time: 01/04/19  6:04 AM  
Result Value Ref Range CK 64 26 - 192 U/L  
 CK - MB 2.6 <3.6 NG/ML  
 CK-MB Index 4.1 (H) 0 - 2.5 Assessment/Plan: Active Problems: 
  Chest pain (1/3/2019) 
 
hypokalemia, positive cocaine and marijuan (uds) Monitor on tele, possible stress test 
Cont aspirin and morphine prn. Lipids pending. Care Plan discussed with: Patient/Family Total time spent with patient/care team: 30 minutes. Signed By: Ric Freeman DO   
 January 4, 2019

## 2019-01-04 NOTE — PROGRESS NOTES
CM reviewed chart. CM met with pt, spoke with pt boyfriend briefly, was celso to answer the basic questions. Pt sleeping- did not wake up to complete assessment. Boyfriend added his phone number [phone] Addendum: 
CM met with pt to complete initial assessment. Pt updated her apartment number to [id number]. Pt reported she had a stroke in 1970- done many things to help her get her speech back. Pt stated she gets social security monthly. Pt also reported she has a  with Arlet Enamorado [phone]. Pt arrived via EMS and will need transportation back home upon discharge. Pt agreed to have PCP follow-up- scheduled for 1/9/19 at 10 am.  
 
 
 
 
 
[facility] [place]

## 2019-01-04 NOTE — PROGRESS NOTES
[facility]: MED RECONCILIATION Comments/Recommendations:  
? Patient states she is supposed to take \"Abilify and some blood pressure meds\" but has not taken them in \"a year\" Information obtained from: Patient Allergies: Patient has no known allergies. Prior to Admission Medications: None Thank you, Ernesto Montalvo, PharmD, BCPS 
[phone]

## 2019-01-04 NOTE — CONSULTS
Cardiology consultation:    I was asked by Dr. Faraz Soliz to assess Ms. [facility]. She is a 80-year-old female who came to the emergency room with chest pain. She has a complex past history including hypertension, prior stroke, and abdominal aortic aneurysm. The pain was initially described as sharp, left parasternal, and associated with shortness of breath as well as with admitted severe anxiety. She had received a benzodiazepine before I interviewed her in the emergency room and she was unable to stay awake to answer history questions. She admitted to recent exposure to cocaine and alcohol use. Her history of stroke stems back to 7. She is currently an everyday smoker. On examination, she is an edentulous elderly appearing lady looking far older than her stated age. She is irritable and sleepy and resentful about being asked of medical history questions. She indicates the location of left parasternal pain with a single finger at approximately ICS #3 with her eyes closed after which she rolls partially on her side and does not respond further. She has trivial ankle edema. Tibial pulses are present but weak. Feet are not cold. There are no carotid bruits on either side within the limitations of the noisy environment of the emergency room. Jugular veins appear to be flat nearly supine. She does not cooperate well with examination but lungs appear to be clear. Cardiac auscultation shows regular rhythm with systolic murmur that is well  from the cardiac sounds. P2 is louder than A2. There is no audible gallop. Abdomen is nontender. She did not permit detailed examination. The first EKG is normal.  Repeat EKGs have been ordered but have not been performed. Chest x-ray performed in the emergency room shows straightening of the left heart border with global moderate cardiac enlargement.   Lung fields appear clear and free of infiltrate but the vascular markings are increased in the lower lobes and rarefied in the upper lobes        Toxicology screen is positive for cocaine as well as for cannabis, negative for opiates. Hemoglobin is 11 with microcytic indices. Potassium is 3.0. Creatinine is 0.94. Cholesterol is high with very high LDL. Troponin is 0.07. Impression: History of abdominal aortic aneurysm    Remarkably advanced biologic age for a 73-year-old    Chest pain with cardiac marker abnormality that does not correlate with renal failure in the presence of normal EKG    High risk for ischemic cardiac event    History of stroke occurring at a very young age, cocaine might be related    Ongoing tobacco abuse    Plan:  Echocardiography for regional wall motion    Cycle EKG and enzymes    Image abdominal aorta for current status    Introduce calcium channel blocker if possible to protect small vessels from cocaine    There is clearly not much interest in treatment compliance.   Prognosis is very poor    Thank you for this consultation    Lincoln Cardona MD [facility] [place]

## 2019-01-04 NOTE — PROGRESS NOTES
[facility]: MED RECONCILIATION 
 
? Unable to complete medication interview at this time. Patient off the floor for testing. Thank you, Gema Juárez, PharmD, BCPS 
[phone]

## 2019-01-05 LAB
ANION GAP SERPL CALC-SCNC: 11 MMOL/L (ref 5–15)
BUN SERPL-MCNC: 8 MG/DL (ref 6–20)
BUN/CREAT SERPL: 11 (ref 12–20)
CALCIUM SERPL-MCNC: 8.8 MG/DL (ref 8.5–10.1)
CHLORIDE SERPL-SCNC: 108 MMOL/L (ref 97–108)
CO2 SERPL-SCNC: 23 MMOL/L (ref 21–32)
CREAT SERPL-MCNC: 0.73 MG/DL (ref 0.55–1.02)
GLUCOSE SERPL-MCNC: 88 MG/DL (ref 65–100)
MAGNESIUM SERPL-MCNC: 1.9 MG/DL (ref 1.6–2.4)
POTASSIUM SERPL-SCNC: 3.4 MMOL/L (ref 3.5–5.1)
SODIUM SERPL-SCNC: 142 MMOL/L (ref 136–145)
TROPONIN I SERPL-MCNC: 0.29 NG/ML

## 2019-01-05 PROCEDURE — 80048 BASIC METABOLIC PNL TOTAL CA: CPT

## 2019-01-05 PROCEDURE — 83735 ASSAY OF MAGNESIUM: CPT

## 2019-01-05 PROCEDURE — 74011250637 HC RX REV CODE- 250/637: Performed by: NEUROMUSCULOSKELETAL MEDICINE & OMM

## 2019-01-05 PROCEDURE — 74011250636 HC RX REV CODE- 250/636: Performed by: NEUROMUSCULOSKELETAL MEDICINE & OMM

## 2019-01-05 PROCEDURE — 74011250636 HC RX REV CODE- 250/636: Performed by: EMERGENCY MEDICINE

## 2019-01-05 PROCEDURE — 99218 HC RM OBSERVATION: CPT

## 2019-01-05 PROCEDURE — 36415 COLL VENOUS BLD VENIPUNCTURE: CPT

## 2019-01-05 PROCEDURE — 84484 ASSAY OF TROPONIN QUANT: CPT

## 2019-01-05 PROCEDURE — 65270000029 HC RM PRIVATE

## 2019-01-05 RX ORDER — NIFEDIPINE 10 MG/1
10 CAPSULE ORAL 3 TIMES DAILY
Status: DISCONTINUED | OUTPATIENT
Start: 2019-01-05 | End: 2019-01-06 | Stop reason: HOSPADM

## 2019-01-05 RX ORDER — POTASSIUM CHLORIDE 750 MG/1
40 TABLET, FILM COATED, EXTENDED RELEASE ORAL ONCE
Status: COMPLETED | OUTPATIENT
Start: 2019-01-05 | End: 2019-01-05

## 2019-01-05 RX ORDER — NIFEDIPINE 10 MG/1
10 CAPSULE ORAL 3 TIMES DAILY
Qty: 30 CAP | Refills: 1 | Status: SHIPPED | OUTPATIENT
Start: 2019-01-05 | End: 2019-01-08

## 2019-01-05 RX ORDER — GUAIFENESIN 100 MG/5ML
81 LIQUID (ML) ORAL DAILY
Qty: 30 TAB | Refills: 0 | Status: SHIPPED | OUTPATIENT
Start: 2019-01-06

## 2019-01-05 RX ORDER — ATORVASTATIN CALCIUM 10 MG/1
20 TABLET, FILM COATED ORAL
Status: DISCONTINUED | OUTPATIENT
Start: 2019-01-05 | End: 2019-01-06 | Stop reason: HOSPADM

## 2019-01-05 RX ADMIN — POTASSIUM CHLORIDE 40 MEQ: 750 TABLET, FILM COATED, EXTENDED RELEASE ORAL at 13:49

## 2019-01-05 RX ADMIN — HEPARIN SODIUM 5000 UNITS: 5000 INJECTION INTRAVENOUS; SUBCUTANEOUS at 08:53

## 2019-01-05 RX ADMIN — MORPHINE SULFATE 4 MG: 4 INJECTION, SOLUTION INTRAMUSCULAR; INTRAVENOUS at 00:12

## 2019-01-05 RX ADMIN — ASPIRIN 81 MG 81 MG: 81 TABLET ORAL at 08:53

## 2019-01-05 RX ADMIN — Medication 10 ML: at 13:47

## 2019-01-05 RX ADMIN — ATORVASTATIN CALCIUM 20 MG: 10 TABLET, FILM COATED ORAL at 22:28

## 2019-01-05 RX ADMIN — Medication 10 ML: at 22:28

## 2019-01-05 RX ADMIN — NIFEDIPINE 10 MG: 10 CAPSULE ORAL at 16:07

## 2019-01-05 RX ADMIN — MORPHINE SULFATE 4 MG: 4 INJECTION, SOLUTION INTRAMUSCULAR; INTRAVENOUS at 20:11

## 2019-01-05 RX ADMIN — SODIUM CHLORIDE 150 ML/HR: 900 INJECTION, SOLUTION INTRAVENOUS at 07:10

## 2019-01-05 RX ADMIN — NIFEDIPINE 10 MG: 10 CAPSULE ORAL at 22:28

## 2019-01-05 RX ADMIN — HEPARIN SODIUM 5000 UNITS: 5000 INJECTION INTRAVENOUS; SUBCUTANEOUS at 22:29

## 2019-01-05 NOTE — PROGRESS NOTES
Problem: Falls - Risk of 
Goal: *Absence of Falls Document Amber Fearing Fall Risk and appropriate interventions in the flowsheet. Outcome: Progressing Towards Goal 
Fall Risk Interventions: 
Mobility Interventions: Patient to call before getting OOB, Strengthening exercises (ROM-active/passive) Medication Interventions: Patient to call before getting OOB

## 2019-01-05 NOTE — PROGRESS NOTES
1935) Bedside and Verbal shift change report given to Leigh RN (oncoming nurse) by [facility] AND [facility], RN (offgoing nurse). Report included the following information SBAR, Kardex, Procedure Summary, Intake/Output, MAR, Accordion, Recent Results, Med Rec Status, Cardiac Rhythm : NSR and Alarm Parameters . 2105) Patient experienced shortness of breath and was sinus tachy on the monitor up to 120 HR after walking 20 feet to the bathroom from the bed. 0030) On call provider notified of CIWA and COW score of 15. No orders given since the patient is resting comfortably after being medicated with Morphine. 0810) Notified that the heart rhythm changed on monitor to possible 3rd degree block, ronnie to 35, and recovered quickly. Patient was in no distress and was sleeping at the time. 3578) Notified Dr Gregorio Sharma (cardiologist on call). No new orders received. Morning troponion drawn as requested. Strip scanned into the system for review.

## 2019-01-05 NOTE — PROGRESS NOTES
0000 patient anxious and agitated, crying and laughing at the same time, when asked about pain stated having pain 10/10 in throat, medicated with morphine when distraction and reassurance did not help. Able to restart IVF and patient visibly calmed down. 7649 Patient visibly calm and states pain is better. Patient verbalizes the last time she ingested cocaine was New Years or New Years Joana. Primary nurse notified physician and completed a COWS assessment.

## 2019-01-05 NOTE — DISCHARGE SUMMARY
Physician Discharge Summary       Patient: Rachel Booker MRN: [id number]  SSN: [id number]    YOB: 1950  Age: 79 y.o. Sex: female    PCP: None    Allergies: Patient has no known allergies. Admit date: 1/3/2019  Admitting Provider: Adrianna Rosales DO    Discharge date: 1/5/2019  Discharging Provider: Adrianna Rosales DO    * Admission Diagnoses: Chest pain  Chest pain    * Discharge Diagnoses:    Hospital Problems as of 1/5/2019 Date Reviewed: 1/5/2019          Codes Class Noted - Resolved POA    Chest pain ICD-10-CM: R07.9  ICD-9-CM: 786.50  1/3/2019 - Present Unknown              * Hospital Course: 82year old female presented to the er with chest pain / troponin - 0.07 and ekg with ? Septal infarct was admitted for chest pian. Workup. The pt was seen by cardiology and observed on tele. The pt displayed episodes  Of tachy / ronnie with 3rd degree heart block. Angélica Saji went as high as 140 and as low as 35. She is currently assymptomatic and in sinus rhythm with no chest pain. Cardiology recommended trx for pacer evaluation and placing the pt on procardia for bp control with hx of cocaine use (last use was new years kwadwo). * Procedures: none  * No surgery found *      Consults: Cardiology    Significant Diagnostic Studies: cardiac graphics: Echocardiogram: ef 85% with reversible restrictive pattern, ekf with nsr and repolarization abnormalities    Discharge Exam:  Visit Vitals  /80 (BP 1 Location: Left arm, BP Patient Position: At rest)   Pulse 76   Temp 97.8 °F (36.6 °C)   Resp 20   Ht 5' 5\" (1.651 m)   Wt 54.3 kg (119 lb 12.8 oz)   SpO2 95%   BMI 19.94 kg/m²     General:  Alert, cooperative, no distress, appears stated age. Head:  Normocephalic, without obvious abnormality, atraumatic. Eyes:  Conjunctivae/corneas clear. PERRL, EOMs intact. Fundi benign. Ears:  Normal TMs and external ear canals both ears. Nose: Nares normal. Septum midline.  Mucosa normal. No drainage or sinus tenderness. Throat: Lips, mucosa, and tongue normal. Teeth and gums normal.   Neck: Supple, symmetrical, trachea midline, no adenopathy, thyroid: no enlargement/tenderness/nodules, no carotid bruit and no JVD. Back:   Symmetric, no curvature. ROM normal. No CVA tenderness. Lungs:   Clear to auscultation bilaterally. Chest wall:  No tenderness or deformity. Heart:  Regular rate and rhythm, S1, S2 normal, no murmur, click, rub or gallop. Extremities: Extremities normal, atraumatic, no cyanosis or edema. Pulses: 2+ and symmetric all extremities. Skin: Skin color, texture, turgor normal. No rashes or lesions. Lymph nodes: Cervical, supraclavicular, and axillary nodes normal.   Neurologic: CNII-XII intact. Normal strength, sensation and reflexes throughout. * Discharge Condition: improved  * Disposition: [facility]  care facility for pacer    Discharge Medications:  Current Discharge Medication List      START taking these medications    Details   aspirin 81 mg chewable tablet Take 1 Tab by mouth daily. Qty: 30 Tab, Refills: 0      NIFEdipine (PROCARDIA) 10 mg capsule Take 1 Cap by mouth three (3) times daily. Qty: 30 Cap, Refills: 1             * Follow-up Care/Patient Instructions: Activity: Activity as tolerated  Diet: Cardiac Diet  Wound Care: None needed    Follow-up Information     Follow up With Specialties Details Why Contact Addi Nova NP Nurse Practitioner On 1/9/2019 Appointment is scheduled for 10 am. Please arrive 30 mins early. Bring photo ID, insurance card, and list of any medications. [address] [facility]  [phone]      None    None (219) Patient stated that they have no PCP          > 30 minutes spent coordinating discharge.   Lea Florez DO  1/5/2019  1:57 PM

## 2019-01-05 NOTE — PROGRESS NOTES
Patient's troponin 0.31, phoned Dr. Eugene Gutiérrez to make him aware, no new orders given at this time.

## 2019-01-05 NOTE — PROGRESS NOTES
Cardiology: 
 
Effie Shukla reportedly had periods of agitation overnight with broad mood lability often with laughing and crying within the same few minutes. She also had manifestations of bradycardia-tachycardia syndrome with episodes of sinus tach interspersed with episodes of complete AV block and sinus bradycardia. There do not appear to have been any associated symptoms. The escape beat mechanism is fairly mature with a narrow QRS and very likely represents a proximal His bundle escape. Escape rate is about 36 bpm. 
 
She needs a calcium channel blocker to help relieve small vessel based ischemia after cocaine exposure. The only safe approach will be with nifedipine beginning with a small dose. Diltiazem and amlodipine would be excessively pro bradycardic for her needs right now. She also needs consideration for a permanent pacemaker. I have spoken with Dr. Wendy Lima and he will initiate transfer. The patient is asking for help with regard to her cocaine habit. She is well supported in this respect by her male significant other who seems to be constantly in the room with her. It might be useful to obtain inpatient behavioral health consultation before she is moved to try to help in assisting to set up ambulatory management. Timely ambulatory behavioral health intervention is almost impossible in the [facility] area right now. 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 This patient will predictably not tolerate tachycardia. This may be the mechanism for dyspnea with minimal ambulation for self-care Bradycardia-tachycardia syndrome with potential for syncope in addition to poorly tolerated high heart rates Plan:  Transferred for consideration for permanent pacemaker, hopefully with maintenance of contributory atrial rhythm because of LVH After pacing, switch the calcium channel blocker to help avoid tachycardia Strongly recommend behavioral health consultation for treatment of cocaine habituation if available. Case discussed at length with Dr. Julio Henley (hospitalist) Ashley Conner MD [facility]

## 2019-01-05 NOTE — PROGRESS NOTES
Pt aware about need Pace maker and going to transfer out to [facility] [place] or [facility] awaiting for bed.  
 
1930. Bedside and Verbal shift change report given to Hue Garcia (oncoming nurse) by Mildred Joshua rn (offgoing nurse). Report included the following information SBAR, Kardex, Intake/Output, MAR, Recent Results, Med Rec Status and Cardiac Rhythm SR/STACH.

## 2019-01-05 NOTE — PROGRESS NOTES
Bedside shift change report given to Maria R Holloway RN (oncoming nurse) by Dr. [facility] AND [facility] (offgoing nurse). Report included the following information SBAR.

## 2019-01-05 NOTE — PROGRESS NOTES
Problem: Falls - Risk of 
Goal: *Absence of Falls Document Reino Horn Fall Risk and appropriate interventions in the flowsheet. Outcome: Progressing Towards Goal 
Fall Risk Interventions: 
Mobility Interventions: Patient to call before getting OOB Medication Interventions: Patient to call before getting OOB

## 2019-01-06 ENCOUNTER — HOSPITAL ENCOUNTER (INPATIENT)
Age: 69
LOS: 2 days | Discharge: HOME | DRG: 243 | End: 2019-01-08
Attending: INTERNAL MEDICINE | Admitting: HOSPITALIST
Payer: MEDICARE

## 2019-01-06 VITALS
HEART RATE: 83 BPM | TEMPERATURE: 97.7 F | RESPIRATION RATE: 12 BRPM | SYSTOLIC BLOOD PRESSURE: 113 MMHG | WEIGHT: 119.8 LBS | DIASTOLIC BLOOD PRESSURE: 88 MMHG | BODY MASS INDEX: 19.96 KG/M2 | OXYGEN SATURATION: 95 % | HEIGHT: 65 IN

## 2019-01-06 PROBLEM — F14.10 COCAINE ABUSE (HCC): Status: ACTIVE | Noted: 2019-01-06

## 2019-01-06 PROBLEM — I49.5 TACHY-BRADY SYNDROME (HCC): Status: ACTIVE | Noted: 2019-01-06

## 2019-01-06 LAB
ALBUMIN SERPL-MCNC: 3.2 G/DL (ref 3.5–5)
ALBUMIN/GLOB SERPL: 0.6 {RATIO} (ref 1.1–2.2)
ALP SERPL-CCNC: 116 U/L (ref 45–117)
ALT SERPL-CCNC: 16 U/L (ref 12–78)
ANION GAP SERPL CALC-SCNC: 5 MMOL/L (ref 5–15)
AST SERPL-CCNC: 15 U/L (ref 15–37)
BILIRUB SERPL-MCNC: 0.4 MG/DL (ref 0.2–1)
BUN SERPL-MCNC: 13 MG/DL (ref 6–20)
BUN/CREAT SERPL: 14 (ref 12–20)
CALCIUM SERPL-MCNC: 9 MG/DL (ref 8.5–10.1)
CHLORIDE SERPL-SCNC: 105 MMOL/L (ref 97–108)
CO2 SERPL-SCNC: 27 MMOL/L (ref 21–32)
COMMENT, HOLDF: NORMAL
CREAT SERPL-MCNC: 0.93 MG/DL (ref 0.55–1.02)
GLOBULIN SER CALC-MCNC: 5.1 G/DL (ref 2–4)
GLUCOSE SERPL-MCNC: 68 MG/DL (ref 65–100)
MAGNESIUM SERPL-MCNC: 2.2 MG/DL (ref 1.6–2.4)
POTASSIUM SERPL-SCNC: 3.7 MMOL/L (ref 3.5–5.1)
PROT SERPL-MCNC: 8.3 G/DL (ref 6.4–8.2)
SAMPLES BEING HELD,HOLD: NORMAL
SODIUM SERPL-SCNC: 137 MMOL/L (ref 136–145)

## 2019-01-06 PROCEDURE — 74011250637 HC RX REV CODE- 250/637: Performed by: HOSPITALIST

## 2019-01-06 PROCEDURE — 74011250637 HC RX REV CODE- 250/637: Performed by: NEUROMUSCULOSKELETAL MEDICINE & OMM

## 2019-01-06 PROCEDURE — 83735 ASSAY OF MAGNESIUM: CPT

## 2019-01-06 PROCEDURE — 74011250636 HC RX REV CODE- 250/636: Performed by: NEUROMUSCULOSKELETAL MEDICINE & OMM

## 2019-01-06 PROCEDURE — 65660000000 HC RM CCU STEPDOWN

## 2019-01-06 PROCEDURE — 99218 HC RM OBSERVATION: CPT

## 2019-01-06 PROCEDURE — 36415 COLL VENOUS BLD VENIPUNCTURE: CPT

## 2019-01-06 PROCEDURE — 80053 COMPREHEN METABOLIC PANEL: CPT

## 2019-01-06 PROCEDURE — 93005 ELECTROCARDIOGRAM TRACING: CPT

## 2019-01-06 RX ORDER — SODIUM CHLORIDE 0.9 % (FLUSH) 0.9 %
5-10 SYRINGE (ML) INJECTION AS NEEDED
Status: DISCONTINUED | OUTPATIENT
Start: 2019-01-06 | End: 2019-01-08 | Stop reason: HOSPADM

## 2019-01-06 RX ORDER — NIFEDIPINE 10 MG/1
10 CAPSULE ORAL 3 TIMES DAILY
Status: DISCONTINUED | OUTPATIENT
Start: 2019-01-06 | End: 2019-01-07

## 2019-01-06 RX ORDER — IPRATROPIUM BROMIDE AND ALBUTEROL SULFATE 2.5; .5 MG/3ML; MG/3ML
3 SOLUTION RESPIRATORY (INHALATION)
Status: DISCONTINUED | OUTPATIENT
Start: 2019-01-06 | End: 2019-01-08 | Stop reason: HOSPADM

## 2019-01-06 RX ORDER — ENOXAPARIN SODIUM 100 MG/ML
40 INJECTION SUBCUTANEOUS EVERY 24 HOURS
Status: DISCONTINUED | OUTPATIENT
Start: 2019-01-06 | End: 2019-01-06

## 2019-01-06 RX ORDER — GUAIFENESIN 100 MG/5ML
81 LIQUID (ML) ORAL DAILY
Status: DISCONTINUED | OUTPATIENT
Start: 2019-01-07 | End: 2019-01-08 | Stop reason: HOSPADM

## 2019-01-06 RX ORDER — CEFAZOLIN SODIUM/WATER 2 G/20 ML
2 SYRINGE (ML) INTRAVENOUS
Status: COMPLETED | OUTPATIENT
Start: 2019-01-07 | End: 2019-01-07

## 2019-01-06 RX ORDER — SODIUM CHLORIDE 0.9 % (FLUSH) 0.9 %
5-10 SYRINGE (ML) INJECTION EVERY 8 HOURS
Status: DISCONTINUED | OUTPATIENT
Start: 2019-01-06 | End: 2019-01-08 | Stop reason: HOSPADM

## 2019-01-06 RX ORDER — OXYCODONE AND ACETAMINOPHEN 5; 325 MG/1; MG/1
1 TABLET ORAL
Status: DISCONTINUED | OUTPATIENT
Start: 2019-01-06 | End: 2019-01-08 | Stop reason: HOSPADM

## 2019-01-06 RX ADMIN — ASPIRIN 81 MG 81 MG: 81 TABLET ORAL at 09:07

## 2019-01-06 RX ADMIN — Medication 10 ML: at 21:20

## 2019-01-06 RX ADMIN — NIFEDIPINE 10 MG: 10 CAPSULE ORAL at 21:20

## 2019-01-06 RX ADMIN — MORPHINE SULFATE 4 MG: 4 INJECTION, SOLUTION INTRAMUSCULAR; INTRAVENOUS at 04:17

## 2019-01-06 RX ADMIN — HEPARIN SODIUM 5000 UNITS: 5000 INJECTION INTRAVENOUS; SUBCUTANEOUS at 09:34

## 2019-01-06 RX ADMIN — Medication 10 ML: at 14:53

## 2019-01-06 RX ADMIN — NIFEDIPINE 10 MG: 10 CAPSULE ORAL at 09:07

## 2019-01-06 RX ADMIN — Medication 10 ML: at 04:17

## 2019-01-06 RX ADMIN — NIFEDIPINE 10 MG: 10 CAPSULE ORAL at 16:25

## 2019-01-06 RX ADMIN — OXYCODONE HYDROCHLORIDE AND ACETAMINOPHEN 1 TABLET: 5; 325 TABLET ORAL at 16:25

## 2019-01-06 NOTE — PROGRESS NOTES
Hospitalist Progress Note Subjective:  
Daily Progress Note: 2019 9:43 AM 
 
Reports chest pain overnight,  10/10 down to 0/10 with morphine. Pt was substernal \"dull and sharp\" as per the pt.  virginie assymptomatic. Current Facility-Administered Medications Medication Dose Route Frequency  NIFEdipine (PROCARDIA) capsule 10 mg  10 mg Oral TID  atorvastatin (LIPITOR) tablet 20 mg  20 mg Oral QHS  sodium chloride (NS) flush 5-10 mL  5-10 mL IntraVENous Q8H  
 sodium chloride (NS) flush 5-10 mL  5-10 mL IntraVENous PRN  
 aspirin chewable tablet 81 mg  81 mg Oral DAILY  morphine injection 4 mg  4 mg IntraVENous Q4H PRN  
 heparin (porcine) injection 5,000 Units  5,000 Units SubCUTAneous Q12H  
 acetaminophen (TYLENOL) tablet 650 mg  650 mg Oral Q6H PRN  
 ondansetron (ZOFRAN) injection 4 mg  4 mg IntraVENous Q6H PRN  
 alum-mag hydroxide-simeth (MYLANTA) oral suspension 30 mL  30 mL Oral Q4H PRN Review of Systems A comprehensive review of systems was negative except for that written in the HPI. Objective:  
 
Visit Vitals /63 (BP 1 Location: Left arm, BP Patient Position: At rest) Pulse 74 Temp 98.4 °F (36.9 °C) Resp 16 Ht 5' 5\" (1.651 m) Wt 54.3 kg (119 lb 12.8 oz) SpO2 94% BMI 19.94 kg/m² O2 Device: Room air Temp (24hrs), Av.5 °F (36.9 °C), Min:97.8 °F (36.6 °C), Max:99.1 °F (37.3 °C) No intake/output data recorded. No intake/output data recorded. Visit Vitals /63 (BP 1 Location: Left arm, BP Patient Position: At rest) Pulse 74 Temp 98.4 °F (36.9 °C) Resp 16 Ht 5' 5\" (1.651 m) Wt 54.3 kg (119 lb 12.8 oz) SpO2 94% BMI 19.94 kg/m² General:  Alert, cooperative, no distress, appears stated age. Head:  Normocephalic, without obvious abnormality, atraumatic. Eyes:  Conjunctivae/corneas clear. PERRL, EOMs intact. Fundi benign. Ears:  Normal TMs and external ear canals both ears. Nose: Nares normal. Septum midline. Mucosa normal. No drainage or sinus tenderness. Throat: Lips, mucosa, and tongue normal. Teeth and gums normal.  
Neck: Supple, symmetrical, trachea midline, no adenopathy, thyroid: no enlargement/tenderness/nodules, no carotid bruit and no JVD. Back:   Symmetric, no curvature. ROM normal. No CVA tenderness. Lungs:   Clear to auscultation bilaterally. Chest wall:  No tenderness or deformity. Heart:  Regular rate and rhythm, S1, S2 normal, no murmur, click, rub or gallop. Breast Exam:  No tenderness, masses, or nipple abnormality. Abdomen:   Soft, non-tender. Bowel sounds normal. No masses,  No organomegaly. Genitalia:  Normal female without lesion, discharge or tenderness. Rectal:  Normal tone,  no masses or tenderness Guaiac negative stool. Extremities: Extremities normal, atraumatic, no cyanosis or edema. Pulses: 2+ and symmetric all extremities. Skin: Skin color, texture, turgor normal. No rashes or lesions. Lymph nodes: Cervical, supraclavicular, and axillary nodes normal.  
Neurologic: CNII-XII intact. Normal strength, sensation and reflexes throughout. Additional comments:None Data Review No results found for this or any previous visit (from the past 24 hour(s)). Assessment/Plan: Active Problems: 
  Chest pain (1/3/2019) 
 
hypokalemia, positive cocaine and marijuan (uds) 
  
3rd degree heart block - currently in sinus. Give 20 meq kcl. Awaiting trx for pacer evaluation. 
  
? acs - troponin's trending down. no plans for coronary evaluation at this time. bp tolerable on procardia, but still experiencing cp overnight. Care Plan discussed with: Patient/Family Total time spent with patient/care team: 20 minutes. Signed By: Merly Mahoney DO   
 January 6, 2019

## 2019-01-06 NOTE — PROGRESS NOTES
0700) Bedside shift change report given to Gildardo Henry, RN (oncoming nurse) by [facility], RN (offgoing nurse). Report included the following information SBAR, Kardex, MAR, Accordion, Recent Results and Cardiac Rhythm NSR with previous AV block, ST, and SB.  
0800) Pt pleasant, cooperative--stated she does not have any pain this morning.  
0909) pt request I return with heparin shot after done eating breakfast 
1127) Pt transfer with [facility] to [facility]. Belongings returned to patient.

## 2019-01-06 NOTE — PROGRESS NOTES
1109) .. TRANSFER - OUT REPORT: 
 
Verbal report given to Priscilla Borges RN(name) on [facility]  being transferred to [address] [place] [address] for pacemaker placment Report consisted of patients Situation, Background, Assessment and  
Recommendations(SBAR). Information from the following report(s) SBAR, Kardex, MAR, Accordion, Recent Results and Cardiac Rhythm NSR was reviewed with the receiving nurse. Lines:  
Peripheral IV 01/03/19 Right Antecubital (Active) Site Assessment Clean, dry, & intact 1/6/2019  8:03 AM  
Phlebitis Assessment 0 1/6/2019  8:03 AM  
Infiltration Assessment 0 1/6/2019  8:03 AM  
Dressing Status Clean, dry, & intact 1/6/2019  8:03 AM  
Dressing Type Tape;Transparent 1/6/2019  8:03 AM  
Hub Color/Line Status Pink; Infusing 1/5/2019  8:47 AM  
Action Taken Open ports on tubing capped 1/5/2019  8:47 AM  
  
 
Opportunity for questions and clarification was provided. Patient transported with: 
[facility]

## 2019-01-06 NOTE — DISCHARGE SUMMARY
Physician Discharge Summary       Patient: Ya Allison MRN: [id number]  SSN: [id number]    YOB: 1950  Age: 79 y.o. Sex: female    PCP: None    Allergies: Patient has no known allergies. Admit date: 1/3/2019  Admitting Provider: Fredy Finn DO    Discharge date: 1/6/2019  Discharging Provider: Fredy Finn DO    * Admission Diagnoses: Chest pain  Chest pain    * Discharge Diagnoses:    Hospital Problems as of 1/6/2019 Date Reviewed: 1/5/2019          Codes Class Noted - Resolved POA    Chest pain ICD-10-CM: R07.9  ICD-9-CM: 786.50  1/3/2019 - Present Unknown              * Hospital Course: 82year old female with hx of cocaine us (smoking) presents to the er with chest pain / troponin 0.07 (peak 0.31) and ekg with ? Septal infarct. Pt was admitte for cp workup and cardiology eval.  The pt underwent an echo eval showing ef of 85% and concentric lvh. On the night of 1/4 the pt developed episode of tachybrady rhythm (140-35) with 3rd degree hb during the ronnie rhythm. The pt was assymptomatic at the time. Cardiology recommended trx for pacer evaluation. * Procedures: none  * No surgery found *      Consults: Cardiology    Significant Diagnostic Studies: echo - ef 85% with concentric lvh, ekg with nsr and repolarization abnormalities. Discharge Exam:  Visit Vitals  /63 (BP 1 Location: Left arm, BP Patient Position: At rest)   Pulse 74   Temp 98.4 °F (36.9 °C)   Resp 16   Ht 5' 5\" (1.651 m)   Wt 54.3 kg (119 lb 12.8 oz)   SpO2 94%   BMI 19.94 kg/m²     General:  Alert, cooperative, no distress, appears stated age. Head:  Normocephalic, without obvious abnormality, atraumatic. Eyes:  Conjunctivae/corneas clear. PERRL, EOMs intact. Fundi benign. Ears:  Normal TMs and external ear canals both ears. Nose: Nares normal. Septum midline. Mucosa normal. No drainage or sinus tenderness.    Throat: Lips, mucosa, and tongue normal. Teeth and gums normal.   Neck: Supple, symmetrical, trachea midline, no adenopathy, thyroid: no enlargement/tenderness/nodules, no carotid bruit and no JVD. Back:   Symmetric, no curvature. ROM normal. No CVA tenderness. Lungs:   Clear to auscultation bilaterally. Chest wall:  No tenderness or deformity. Heart:  Regular rate and rhythm, S1, S2 normal, no murmur, click, rub or gallop. Extremities: Extremities normal, atraumatic, no cyanosis or edema. Pulses: 2+ and symmetric all extremities. Skin: Skin color, texture, turgor normal. No rashes or lesions. Lymph nodes: Cervical, supraclavicular, and axillary nodes normal.   Neurologic: CNII-XII intact. Normal strength, sensation and reflexes throughout. * Discharge Condition: improved  * Disposition: [facility] care facility    Discharge Medications:  Current Discharge Medication List      START taking these medications    Details   aspirin 81 mg chewable tablet Take 1 Tab by mouth daily. Qty: 30 Tab, Refills: 0      NIFEdipine (PROCARDIA) 10 mg capsule Take 1 Cap by mouth three (3) times daily. Qty: 30 Cap, Refills: 1             * Follow-up Care/Patient Instructions: Activity: Activity as tolerated  Diet: Cardiac Diet  Wound Care: None needed    Follow-up Information     Follow up With Specialties Details Why Contact Addi Mills NP Nurse Practitioner On 1/9/2019 Appointment is scheduled for 10 am. Please arrive 30 mins early. Bring photo ID, insurance card, and list of any medications. [address]  [phone]      None    None ([phone]) Patient stated that they have no PCP        > 30 minutes spent in coordinating care.     Bacilio Goodman DO  1/6/2019  11:16 AM

## 2019-01-06 NOTE — PROGRESS NOTES
Bedside and Verbal shift change report given to SHAGUFTA Dooley (oncoming nurse) by Zi Purcell (offgoing nurse). Report included the following information Kardex, MAR and Recent Results.

## 2019-01-06 NOTE — PROGRESS NOTES
Problem: Falls - Risk of 
Goal: *Absence of Falls Document Ebb Macedonian Fall Risk and appropriate interventions in the flowsheet. Outcome: Progressing Towards Goal 
Fall Risk Interventions: 
Mobility Interventions: Patient to call before getting OOB Medication Interventions: Evaluate medications/consider consulting pharmacy, Patient to call before getting OOB

## 2019-01-06 NOTE — PROGRESS NOTES
Problem: Falls - Risk of 
Goal: *Absence of Falls Document Reino Horn Fall Risk and appropriate interventions in the flowsheet. Outcome: Progressing Towards Goal 
Fall Risk Interventions: 
Mobility Interventions: Patient to call before getting OOB Medication Interventions: Evaluate medications/consider consulting pharmacy

## 2019-01-06 NOTE — CONSULTS
Cardiac Electrophysiology Hospital Consultation Note   REFERRING PROVIDER: Dr Smith and Jones  Subjective:      Alondra Vilchis is a 79 y.o. patient who is seen for evaluation of complete av block  She had this 1/5/2019 at [facility] and is transferred here for pacemaker implant  She has had dizziness, RAZO and palpitation for about 6 months but did not want to tell anyone  She was admitted to [facility] with chest pain and drug screen + cocaine and THC, she said she uses it sometimes  Troponin was initially 0.07 but peaked at 0.3 and ECG showed NSR with septal q and ST elevation< 1 mm in V1-2  Echo 1/3/2019 with LVEF 70% severe LVH and mild MR  Telemetry 1/5 showed sinus rhythm with third degree av block and bradycardia to 15 bpm  She has short intermittent atrial flutter or atrial tachycardia   She had stent many years ago at [facility] but then went to [facility] for cardiac care  She reported 2 nuclear stress test with patent stents but said the chemical use made her feel so sick that she did not come back for follow up until she met Dr Smith       Patient Active Problem List   Diagnosis Code    Chest pain R07.9    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 Current Outpatient Medications on File Prior to Encounter   Medication Sig Dispense Refill    aspirin 81 mg chewable tablet Take 1 Tab by mouth daily. 30 Tab 0    NIFEdipine (PROCARDIA) 10 mg capsule Take 1 Cap by mouth three (3) times daily.  30 Cap 1       Current Facility-Administered Medications   Medication Dose Route Frequency Provider Last Rate Last Dose    sodium chloride (NS) flush 5-10 mL  5-10 mL IntraVENous Q8H RAMOS Goldman MD        sodium chloride (NS) flush 5-10 mL  5-10 mL IntraVENous PRN Lauren Goldman MD        [START ON 1/7/2019] aspirin chewable tablet 81 mg  81 mg Oral DAILY JEANETTE Goldman MD        NIFEdipine (PROCARDIA) capsule 10 mg  10 mg Oral TID Jared Goldman MD        albuterol-ipratropium (DUO-NEB) 2.5 MG-0.5 MG/3 ML  3 mL Nebulization Q6H PRN Veronica Goldman MD         No Known Allergies  Past Medical History:   Diagnosis Date    Anxiety     Hypertension     Stroke ([facility]     1970     No past surgical history on file. No family history of pacemaker  Social History     Tobacco Use    Smoking status: Current Every Day Smoker    Smokeless tobacco: Never Used   Substance Use Topics    Alcohol use: Yes     Frequency: Never        Review of Systems:   Constitutional: Negative for fever, chills, weight loss, +malaise/fatigue. HEENT: Negative for nosebleeds, vision changes. Respiratory: Negative for cough, hemoptysis  Cardiovascular: Negative for orthopnea, claudication, leg swelling, syncope, and PND. Gastrointestinal: Negative for nausea, vomiting, diarrhea, blood in stool and melena. Genitourinary: Negative for dysuria, and hematuria. Musculoskeletal: Negative for myalgias, + right heel arthralgia. Skin: Negative for rash. Heme: Does not bleed or bruise easily. Neurological: Negative for speech change and focal weakness + dizziness     Objective:     Visit Vitals  BP 94/57 (BP 1 Location: Left arm, BP Patient Position: At rest)   Pulse 83   Temp 98.1 °F (36.7 °C)   Resp 14   SpO2 97%      Physical Exam:   Constitutional: thin woman who appears older than stated age. No respiratory distress. Head: Normocephalic and atraumatic. Eyes: Pupils are equal, round  ENT: hearing normal, edentulous  Neck: supple   Cardiovascular: Normal rate, regular rhythm. Exam reveals no gallop and no friction rub. No murmur heard. Pulmonary/Chest: Effort normal and breath sounds normal. No wheezes. Abdominal: Soft, no tenderness. Musculoskeletal: no edema. Neurological: alert, oriented. Skin: Skin is warm and dry  Psychiatric: normal mood and affect.  Behavior is normal. Judgment and thought content normal. Assessment/Plan:   Tachycardia and bradycardia syndrome with symptoms of palpitation RAZO and dizziness  She has paroxysmal atrial flutter/atrial tachycardia and sinus rhythm with complete av block   She meets indication for dual chamber pacemaker and then labetalol or diltiazem (change nifedipine to cardizem)  Troponin elevation is likely due to severe LVH and cocaine used with cigarette smoking, echo with vigorous LVEF at [facility]  She did not want another stress test, citing that she had 2 at [facility] in 3 years with normal result and did not feel good after the chemical given  Once confirmed atrial flutter/fibrillation on pacemaker she will need anticoagulant to prevent stroke  She and her  have been explained risks and benefits and they agree to proceed tomorrow 12 noon  I have discussed with her nurse: she may have clear liquid breakfast in am but then NPO until pacer   Risks involve device implant include but are not limited to bleeding, infection, valvular damage, heart attack, stroke, lung collapse (pneumothorax or hemothorax), heart collapse (pericardial tamponade), heart perforation, kidney failure, death. Elective or emergency surgery may be required to repair some of these complications. Prolonged hospitalization would be required. General anesthesia may be required for the procedure. Thank you for involving me in this patient's care and please call with further concerns or questions. Bertha Peace M.D.   Electrophysiology/Cardiology  [facility] [place]  [address] [facility] [address]  [address] [facility], [address] [place] [address]  [phone]

## 2019-01-06 NOTE — H&P
HISTORY AND PHYSICAL  Elizabeth Valentin MD        PCP: None      Chief Complaint: referred from [facility] for tachybrady syndrome for evaluation for pacemaker. History of present illness: This is a 79year old  female who has a past medical history of Anxiety, Hypertension, and Stroke (HCC),remote MI with stents,polysubstance abuse initially presented to [address] [place] [address] on 1/3 for chest pain. EKG was abnormal with anteroseptal infarct , age undetermined and troponin peaked at 0.31. While being observed in the hospital,she was noted to be in significant tachy ronnie. Dr Samuel Carpenter was following her and recommended she come to [facility] for EP evaluation for possible PPM.    Presently,patient denied palpitation, chest pressure,dizziness. She admitted using cocaine and THC on New Years kwadwo and she is chronic smoker since the age of 27. She denied diagnosis of copd. PMH/PSH:  Past Medical History:   Diagnosis Date    Anxiety     Hypertension     Stroke ([facility]     1970     No past surgical history on file. Home meds:   Prior to Admission medications    Medication Sig Start Date End Date Taking? Authorizing Provider   aspirin 81 mg chewable tablet Take 1 Tab by mouth daily. 1/6/19   Gaudencio Mcconnell DO   NIFEdipine (PROCARDIA) 10 mg capsule Take 1 Cap by mouth three (3) times daily. 1/5/19   Nubia GOLDSTEIN DO       Allergies:  No Known Allergies    FH:  No family history on file. SH:  Social History     Tobacco Use    Smoking status: Current Every Day Smoker    Smokeless tobacco: Never Used   Substance Use Topics    Alcohol use: Yes     Frequency: Never       ROS: A comprehensive review of systems was negative except for that written in the HPI. PHYSICAL EXAM:  Visit Vitals  BP 94/57 (BP 1 Location: Left arm, BP Patient Position: At rest)   Pulse 83   Temp 98.1 °F (36.7 °C)   Resp 14   SpO2 97%     General:  Alert, cooperative, no distress, appears stated age.    Eyes: Conjunctivae/corneas clear. PERRL, EOMs intact. Fundi benign   Ears:  Normal TMs and external ear canals both ears. Nose: Nares normal. Septum midline. Mucosa normal. No drainage or sinus tenderness. Mouth/Throat: Lips, mucosa, and tongue normal. Teeth and gums normal.   Neck: Supple, symmetrical, trachea midline, no adenopathy, thyroid: no enlargment/tenderness/nodules, no carotid bruit and no JVD. Back:   Symmetric, no curvature. ROM normal. No CVA tenderness. Lungs:   Scattered wheezing. Over all diminished air entry   Heart:  Regular rate and rhythm, S1, S2 normal, no murmur, click, rub or gallop. Abdomen:   Soft, non-tender. Bowel sounds normal. No masses,  No organomegaly. Extremities: Extremities normal, atraumatic, no cyanosis or edema. Pulses: 2+ and symmetric all extremities. Skin: Skin color, texture, turgor normal. No rashes or lesions   Lymph nodes: Cervical, supraclavicular, and axillary nodes normal.   Neurologic: CNII-XII intact. Normal strength, sensation and reflexes throughout. Labs/Imaging:  No results found for this or any previous visit (from the past 24 hour(s)). No results for input(s): WBC, HGB, HCT, PLT, HGBEXT, HCTEXT, PLTEXT in the last 72 hours. Recent Labs     01/05/19  0659      K 3.4*      CO2 23   BUN 8   CREA 0.73   GLU 88   CA 8.8   MG 1.9     No results for input(s): SGOT, GPT, ALT, AP, TBIL, TBILI, TP, ALB, GLOB, GGT, AML, LPSE in the last 72 hours. No lab exists for component: AMYP, HLPSE    Recent Labs     01/05/19  0659 01/04/19  1842 01/04/19  0604   CPK  --   --  64   CKNDX  --   --  4.1*   TROIQ 0.29* 0.31*  --        No results for input(s): INR, PTP, APTT in the last 72 hours. No lab exists for component: INREXT     No results for input(s): PH, PCO2, PO2 in the last 72 hours. Assessment & Plan:  Tachycardia ronnie cardia syndrome (140-35) with 3rd degree hb during the ronnie rhythm.   -Presently asymptomatic  -She needs EP eval and possible PPM.Talked with Dr Elvin Koroma who will see patient.  -No BB  -On CBB,nifedipine per cardiology     Abnormal trop/EKG,nstemi . She has h/o CAD s/p stents  -She is active cocaine user  -Echocardiogram with hyperdynamic LV . grade 3 diastolic impairment   -Continue aspirin.  -Further mx per cardiology    Remote h/o stroke in the 1970. She does not remember the cause.  -Continue aspirin    Poly substance abuse: counseled. She needs out patient behavioral health evaluation and follow up    Active smoker:counseled,she is not ready to quit. Declined nicotine patch. DVT ppx: scd  Code status: full  Disposition: home once all is sorted out.                 Signed By: Taylor Garcia MD     January 6, 2019

## 2019-01-06 NOTE — PROGRESS NOTES
1218- rec'd pt from [facility]; VSS. Pt A/O x4- significant other at the beside. David Healy MT/US called admitting and advised that patient is here. 1230- paged triage physician to notify pt is here and for admission orders     [phone]- spoke with Apollo Mario, SAPNA- Dr. Asa Coronado will see Ms. Herrera    1600- pt called to state she has left shoulder pain that is radiating to her chest, she states the pain is similar to what she has felt and gets morphine to resolve it. 12 lead EKG done- no change from prior- NSR; called Dr. Asa Coronado and left message to call    [phone]- spoke with Dr. Asa Coronado, reported situation- troponins that were ordered were canceled by Dr. Angel Strickland- Dr. Asa Coronado did not reorder; order given for percocet- Dr. Asa Coronado to put in order.

## 2019-01-07 ENCOUNTER — APPOINTMENT (OUTPATIENT)
Dept: GENERAL RADIOLOGY | Age: 69
DRG: 243 | End: 2019-01-07
Attending: NURSE PRACTITIONER
Payer: MEDICARE

## 2019-01-07 ENCOUNTER — APPOINTMENT (OUTPATIENT)
Dept: GENERAL RADIOLOGY | Age: 69
DRG: 243 | End: 2019-01-07
Attending: HOSPITALIST
Payer: MEDICARE

## 2019-01-07 LAB
ATRIAL RATE: 75 BPM
CALCULATED P AXIS, ECG09: 54 DEGREES
CALCULATED R AXIS, ECG10: -14 DEGREES
CALCULATED T AXIS, ECG11: 37 DEGREES
DIAGNOSIS, 93000: NORMAL
P-R INTERVAL, ECG05: 150 MS
Q-T INTERVAL, ECG07: 366 MS
QRS DURATION, ECG06: 68 MS
QTC CALCULATION (BEZET), ECG08: 408 MS
VENTRICULAR RATE, ECG03: 75 BPM

## 2019-01-07 PROCEDURE — 65660000000 HC RM CCU STEPDOWN

## 2019-01-07 PROCEDURE — 99152 MOD SED SAME PHYS/QHP 5/>YRS: CPT

## 2019-01-07 PROCEDURE — 74011000250 HC RX REV CODE- 250: Performed by: INTERNAL MEDICINE

## 2019-01-07 PROCEDURE — C1785 PMKR, DUAL, RATE-RESP: HCPCS

## 2019-01-07 PROCEDURE — 77030018547 HC SUT ETHBND1 J&J -B

## 2019-01-07 PROCEDURE — 74011250636 HC RX REV CODE- 250/636: Performed by: INTERNAL MEDICINE

## 2019-01-07 PROCEDURE — C1898 LEAD, PMKR, OTHER THAN TRANS: HCPCS

## 2019-01-07 PROCEDURE — 02HK3JZ INSERTION OF PACEMAKER LEAD INTO RIGHT VENTRICLE, PERCUTANEOUS APPROACH: ICD-10-PCS | Performed by: INTERNAL MEDICINE

## 2019-01-07 PROCEDURE — 77030037029 HC IMPL ENV ICD ANTIBACT ABSRB TYRX MEDT -G

## 2019-01-07 PROCEDURE — 0JH606Z INSERTION OF PACEMAKER, DUAL CHAMBER INTO CHEST SUBCUTANEOUS TISSUE AND FASCIA, OPEN APPROACH: ICD-10-PCS | Performed by: INTERNAL MEDICINE

## 2019-01-07 PROCEDURE — 74011000258 HC RX REV CODE- 258: Performed by: NURSE PRACTITIONER

## 2019-01-07 PROCEDURE — C1893 INTRO/SHEATH, FIXED,NON-PEEL: HCPCS

## 2019-01-07 PROCEDURE — 77030031139 HC SUT VCRL2 J&J -A

## 2019-01-07 PROCEDURE — C9899 INPT IMPLANT PROS DEV,NO COV: HCPCS

## 2019-01-07 PROCEDURE — 77030018673

## 2019-01-07 PROCEDURE — 74011250636 HC RX REV CODE- 250/636: Performed by: NURSE PRACTITIONER

## 2019-01-07 PROCEDURE — 02H63JZ INSERTION OF PACEMAKER LEAD INTO RIGHT ATRIUM, PERCUTANEOUS APPROACH: ICD-10-PCS | Performed by: INTERNAL MEDICINE

## 2019-01-07 PROCEDURE — 74011636320 HC RX REV CODE- 636/320: Performed by: INTERNAL MEDICINE

## 2019-01-07 PROCEDURE — 71045 X-RAY EXAM CHEST 1 VIEW: CPT

## 2019-01-07 PROCEDURE — 77030039046 HC PAD DEFIB RADIOTRNSPNT CNMD -B

## 2019-01-07 PROCEDURE — A4565 SLINGS: HCPCS

## 2019-01-07 PROCEDURE — 73030 X-RAY EXAM OF SHOULDER: CPT

## 2019-01-07 PROCEDURE — 74011000272 HC RX REV CODE- 272: Performed by: INTERNAL MEDICINE

## 2019-01-07 PROCEDURE — 74011250637 HC RX REV CODE- 250/637: Performed by: HOSPITALIST

## 2019-01-07 RX ORDER — DIPHENHYDRAMINE HYDROCHLORIDE 50 MG/ML
25 INJECTION, SOLUTION INTRAMUSCULAR; INTRAVENOUS ONCE
Status: COMPLETED | OUTPATIENT
Start: 2019-01-07 | End: 2019-01-07

## 2019-01-07 RX ORDER — SODIUM CHLORIDE 0.9 % (FLUSH) 0.9 %
5-40 SYRINGE (ML) INJECTION AS NEEDED
Status: DISCONTINUED | OUTPATIENT
Start: 2019-01-07 | End: 2019-01-08 | Stop reason: HOSPADM

## 2019-01-07 RX ORDER — ATROPINE SULFATE 0.1 MG/ML
1 INJECTION INTRAVENOUS AS NEEDED
Status: DISCONTINUED | OUTPATIENT
Start: 2019-01-07 | End: 2019-01-07

## 2019-01-07 RX ORDER — CEPHALEXIN 500 MG/1
500 CAPSULE ORAL 3 TIMES DAILY
Status: DISCONTINUED | OUTPATIENT
Start: 2019-01-08 | End: 2019-01-08 | Stop reason: HOSPADM

## 2019-01-07 RX ORDER — DILTIAZEM HYDROCHLORIDE 120 MG/1
120 CAPSULE, COATED, EXTENDED RELEASE ORAL DAILY
Status: DISCONTINUED | OUTPATIENT
Start: 2019-01-08 | End: 2019-01-08 | Stop reason: HOSPADM

## 2019-01-07 RX ORDER — SODIUM CHLORIDE 0.9 % (FLUSH) 0.9 %
5-40 SYRINGE (ML) INJECTION EVERY 8 HOURS
Status: DISCONTINUED | OUTPATIENT
Start: 2019-01-07 | End: 2019-01-08 | Stop reason: HOSPADM

## 2019-01-07 RX ORDER — FENTANYL CITRATE 50 UG/ML
25-200 INJECTION, SOLUTION INTRAMUSCULAR; INTRAVENOUS
Status: DISCONTINUED | OUTPATIENT
Start: 2019-01-07 | End: 2019-01-07

## 2019-01-07 RX ORDER — HYDROMORPHONE HYDROCHLORIDE 2 MG/1
2 TABLET ORAL ONCE
Status: COMPLETED | OUTPATIENT
Start: 2019-01-07 | End: 2019-01-07

## 2019-01-07 RX ORDER — ONDANSETRON 2 MG/ML
4 INJECTION INTRAMUSCULAR; INTRAVENOUS
Status: DISCONTINUED | OUTPATIENT
Start: 2019-01-07 | End: 2019-01-08 | Stop reason: HOSPADM

## 2019-01-07 RX ORDER — LIDOCAINE HYDROCHLORIDE 10 MG/ML
10-40 INJECTION INFILTRATION; PERINEURAL AS NEEDED
Status: DISCONTINUED | OUTPATIENT
Start: 2019-01-07 | End: 2019-01-07

## 2019-01-07 RX ORDER — SODIUM CHLORIDE 0.9 % (FLUSH) 0.9 %
5-10 SYRINGE (ML) INJECTION AS NEEDED
Status: DISCONTINUED | OUTPATIENT
Start: 2019-01-07 | End: 2019-01-07

## 2019-01-07 RX ORDER — MIDAZOLAM HYDROCHLORIDE 1 MG/ML
.5-1 INJECTION, SOLUTION INTRAMUSCULAR; INTRAVENOUS
Status: DISCONTINUED | OUTPATIENT
Start: 2019-01-07 | End: 2019-01-07

## 2019-01-07 RX ADMIN — FENTANYL CITRATE 50 MCG: 50 INJECTION INTRAMUSCULAR; INTRAVENOUS at 14:34

## 2019-01-07 RX ADMIN — DIPHENHYDRAMINE HYDROCHLORIDE 25 MG: 50 INJECTION, SOLUTION INTRAMUSCULAR; INTRAVENOUS at 14:38

## 2019-01-07 RX ADMIN — FENTANYL CITRATE 25 MCG: 50 INJECTION INTRAMUSCULAR; INTRAVENOUS at 14:19

## 2019-01-07 RX ADMIN — MIDAZOLAM 2 MG: 1 INJECTION INTRAMUSCULAR; INTRAVENOUS at 14:33

## 2019-01-07 RX ADMIN — MIDAZOLAM 1 MG: 1 INJECTION INTRAMUSCULAR; INTRAVENOUS at 14:49

## 2019-01-07 RX ADMIN — Medication 10 ML: at 16:44

## 2019-01-07 RX ADMIN — LIDOCAINE HYDROCHLORIDE 30 ML: 10 INJECTION, SOLUTION INFILTRATION; PERINEURAL at 14:35

## 2019-01-07 RX ADMIN — Medication 10 ML: at 14:11

## 2019-01-07 RX ADMIN — HYDROMORPHONE HYDROCHLORIDE 2 MG: 2 TABLET ORAL at 22:35

## 2019-01-07 RX ADMIN — MIDAZOLAM 1 MG: 1 INJECTION INTRAMUSCULAR; INTRAVENOUS at 14:10

## 2019-01-07 RX ADMIN — MIDAZOLAM 2 MG: 1 INJECTION INTRAMUSCULAR; INTRAVENOUS at 14:08

## 2019-01-07 RX ADMIN — FENTANYL CITRATE 25 MCG: 50 INJECTION INTRAMUSCULAR; INTRAVENOUS at 14:11

## 2019-01-07 RX ADMIN — ASPIRIN 81 MG CHEWABLE TABLET 81 MG: 81 TABLET CHEWABLE at 09:23

## 2019-01-07 RX ADMIN — Medication 5 ML: at 06:00

## 2019-01-07 RX ADMIN — Medication 10 ML: at 07:17

## 2019-01-07 RX ADMIN — Medication 10 ML: at 16:45

## 2019-01-07 RX ADMIN — Medication 2 G: at 14:11

## 2019-01-07 RX ADMIN — MIDAZOLAM 2 MG: 1 INJECTION INTRAMUSCULAR; INTRAVENOUS at 14:19

## 2019-01-07 RX ADMIN — NIFEDIPINE 10 MG: 10 CAPSULE ORAL at 09:23

## 2019-01-07 RX ADMIN — SODIUM CHLORIDE 50000 UNITS: 900 IRRIGANT IRRIGATION at 14:47

## 2019-01-07 RX ADMIN — FENTANYL CITRATE 50 MCG: 50 INJECTION INTRAMUSCULAR; INTRAVENOUS at 14:08

## 2019-01-07 RX ADMIN — FENTANYL CITRATE 25 MCG: 50 INJECTION INTRAMUSCULAR; INTRAVENOUS at 14:49

## 2019-01-07 RX ADMIN — IOPAMIDOL 10 ML: 755 INJECTION, SOLUTION INTRAVENOUS at 14:34

## 2019-01-07 NOTE — CARDIO/PULMONARY
Cardiac Rehab: Per EMR, patient is a current smoker.  Smoking Cessation Program information placed on the AVS.    Jamarcus Lee RN

## 2019-01-07 NOTE — PROGRESS NOTES
TRANSFER - OUT REPORT:    Verbal report given to [facility] being transferred to Room 353 for routine progression of care       Report consisted of patients Situation, Background, Assessment and   Recommendations(SBAR). Information from the following report(s) SBAR, Procedure Summary, MAR, Recent Results and Cardiac Rhythm NSR was reviewed with the receiving nurse. Opportunity for questions and clarification was provided.

## 2019-01-07 NOTE — PROGRESS NOTES
Reason for Admission:   Arthur/Tachy Syndrome                   RRAT Score:   11                  Plan for utilizing home health: TBD                         Likelihood of Readmission:  Moderate                         Transition of Care Plan:        Patient has been working with a [address] through [employer] for approximately 3 years (Yetta [id number]). Patient reports that CW is working to establish a PCP for the patient. CM attempted to contact [place], but CM was unable to leave  due to inbox being full. Patient reports that the last time she used cocaine was New Years Joana. Patient would like to discuss the possibility of substance abuse rehab; patient wants to include her CW and daughter in this discussion. Patient identifies that she has 2 adult sons and 1 adult daughter. Patient has AMD paperwork at bedside and indicated to this CM the desire to identify her daughter as mPOA. Patient expressed understanding of legal standing for all 3 children and need to have a mPOA on file if desire is to indicate only one child as decision maker. Patient lives in a single story apartment, 6th floor, elevator access,with her significant other. Patient has no hx of , IPR, or SNF. Patient's plan includes: pacer placement today, 19, and possible discharge tomorrow.                    CRM: Ival Nissen, MPH, [address] [place] [address]; Z: [phone]

## 2019-01-07 NOTE — PROGRESS NOTES
Hospitalist Progress Note  Collette Hamburg, MD  Answering service: [phone] OR [phone] from in house phone      Date of Service:  2019  NAME:  Manuela Pack  :  1950  MRN:  [id number]    Admission summary    This is a 79year old  female who has a past medical history of Anxiety, Hypertension, and Stroke (HCC),remote MI with stents,polysubstance abuse initially presented to [address] [place] [address] on 1/3 for chest pain. EKG was abnormal with anteroseptal infarct , age undetermined and troponin peaked at 0.31. While being observed in the hospital,she was noted to be in significant tachy ronnie. Dr Nigel Miranda was following her and recommended she come to [facility] for EP evaluation for possible PPM.       Follow up for:arrythmia,       Subjective    -Complains of left shoulder pain. Specifically denied chest pain or shortness of breath. Assessment & Plan:  Tachycardia ronnie cardia syndrome  with 3rd degree hb during the ronnie rhythm. -PPM insertion planned for  today     Abnormal trop/EKG,nstemi . She has h/o CAD s/p stents  -She is active cocaine user  -Echocardiogram with hyperdynamic LV . grade 3 diastolic impairment   -Continue aspirin. Remote h/o stroke in the . She does not remember the cause.  -Continue aspirin     Poly substance abuse: counseled. She needs out patient behavioral health evaluation and follow up     Active smoker:counseled,she is not ready to quit. Declined nicotine patch.     Left shoulder pain ,worse on ROM  -Xray          ----------------------------------------------  Med list reviewed  Current Facility-Administered Medications   Medication Dose Route Frequency    sodium chloride (NS) flush 5-10 mL  5-10 mL IntraVENous Q8H    sodium chloride (NS) flush 5-10 mL  5-10 mL IntraVENous PRN    aspirin chewable tablet 81 mg  81 mg Oral DAILY    NIFEdipine (PROCARDIA) capsule 10 mg  10 mg Oral TID    albuterol-ipratropium (DUO-NEB) 2.5 MG-0.5 MG/3 ML  3 mL Nebulization Q6H PRN    sodium chloride (NS) flush 5-10 mL  5-10 mL IntraVENous Q8H    sodium chloride (NS) flush 5-10 mL  5-10 mL IntraVENous PRN    ceFAZolin (ANCEF) 2 g/20 mL in sterile water IV syringe  2 g IntraVENous ON CALL    oxyCODONE-acetaminophen (PERCOCET) 5-325 mg per tablet 1 Tab  1 Tab Oral Q6H PRN         Review of Systems:  A comprehensive review of systems was negative except for that written in the HPI.     O:  Visit Vitals  /75 (BP 1 Location: Left arm, BP Patient Position: Sitting)   Pulse 75   Temp 97.8 °F (36.6 °C)   Resp 18   Wt 57.6 kg (127 lb)   SpO2 96%   BMI 21.13 kg/m²       PHYSICAL EXAM:  Gen: NAD, non-toxic  HEENT: anicteric sclerae, normal conjunctiva, oropharynx clear, MM moist  Neck: supple, trachea midline, no adenopathy  Heart: RRR, no MRG, no JVD, no peripheral edema  Lungs: CTA b/l, non-labored respirations  Chest wall:  Abd: soft, NT, ND, BS+, no organomegaly  Extr:left shoulder pain on rom  Skin: dry, no rash  Neuro: CN II-XII grossly intact, normal speech, moves all extremities  Psych: normal mood, appropriate affect    Intake/Output Summary (Last 24 hours) at 1/7/2019 4938  Last data filed at 1/6/2019 1218  Gross per 24 hour   Intake --   Output 50 ml   Net -50 ml          Recent labs & imaging reviewed:    Problem List as of 1/7/2019 Date Reviewed: 1/5/2019          Codes Class Noted - Resolved    Tachy[facility]ronnie syndrome ([facility] ICD-10-CM: I49.5  ICD-9-CM: 427.81  1/6/2019 - Present        Cocaine abuse ([facility] ICD-10-CM: F14.10  ICD-9-CM: 305.60  1/6/2019 - Present        Chest pain ICD-10-CM: R07.9  ICD-9-CM: 786.50  1/3/2019 - Present                Care Plan discussed with:  Patient/Family and Nurse    Mahad Meeks MD  Internal Medicine  Date of Service: 1/7/2019

## 2019-01-07 NOTE — PROGRESS NOTES
Cardiac Electrophysiology Hospital Progress Note   REFERRING PROVIDER: Dr Michi Epstein and Jones  Subjective:      Lucho Green is a 79 y.o. patient who is seen for evaluation/management of complete av block. She had this 1/5/2019 at [facility] and was transferred here for pacemaker implant. Reports dizziness, RAZO, & palpitations x approx 6 months, but did not want to tell anyone. Admitted to [facility] with chest pain & positive drug screen (cocaine & THC), admits to using both at times. Troponin was initially 0.07 but peaked at 0.3 and ECG showed NSR with septal q and ST elevation< 1 mm in V1-2. Short intermittent atrial flutter or atrial tachycardia noted on telemetry in addition to sinus rhythm with complete AV block. Telemetry 01/05 showed sinus rhythm with third degree av block and bradycardia to 15 bpm.      Echo (1/3/2019):  LVEF 85%, LV wall thickness mod to markedly increased. Severe concentric LV hypertrophy, increased mass. Grade 3 diastolic dysfunction. Ventricular septum thickness markedly increased. RV wall hypertrophy. Lipomatous hypertrophy of atrial septum. Mild MR. Aortic sclerosis. Previous:  She had stent many years ago at [facility] but then went to [facility] for cardiac care. 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 Current Outpatient Medications on File Prior to Encounter   Medication Sig Dispense Refill    aspirin 81 mg chewable tablet Take 1 Tab by mouth daily. 30 Tab 0    NIFEdipine (PROCARDIA) 10 mg capsule Take 1 Cap by mouth three (3) times daily.  30 Cap 1       Current Facility-Administered Medications   Medication Dose Route Frequency Provider Last Rate Last Dose    atropine injection 1 mg  1 mg IntraVENous PRN Shravan Sim MD        fentaNYL citrate (PF) injection  mcg   mcg IntraVENous Multiple Shravan Sim MD        saline peripheral flush soln 5-10 mL  5-10 mL InterCATHeter PRN Shravan Sim MD        midazolam (VERSED) injection 0.5-10 mg  0.5-10 mg IntraVENous Mykel Sim MD        iopamidol (ISOVUE-370) 76 % injection 50 mL  50 mL IntraVENous PRN Shravan Sim MD        lidocaine (XYLOCAINE) 10 mg/mL (1 %) injection 10-40 mL  10-40 mL SubCUTAneous PRN Shravan Sim MD        bacitracin 50,000 Units in sodium chloride irrigation 0.9 % 500 mL irrigation solution  50,000 Units Irrigation Christos Carnes MD        sodium chloride (NS) flush 5-10 mL  5-10 mL IntraVENous Q8H BREANNA Goldman MD   10 mL at 01/07/19 0717    sodium chloride (NS) flush 5-10 mL  5-10 mL IntraVENous PRN Jared Goldman MD        aspirin chewable tablet 81 mg  81 mg Oral DAILY Jared Goldman MD   81 mg at 01/07/19 0923    NIFEdipine (PROCARDIA) capsule 10 mg  10 mg Oral TID Jared Goldman MD   10 mg at 01/07/19 0923    albuterol-ipratropium (DUO-NEB) 2.5 MG-0.5 MG/3 ML  3 mL Nebulization Q6H PRN Jared Goldman MD        sodium chloride (NS) flush 5-10 mL  5-10 mL IntraVENous Q8H Shravan Sim MD   5 mL at 01/07/19 0600    sodium chloride (NS) flush 5-10 mL  5-10 mL IntraVENous PRN Shravan Sim MD        ceFAZolin (ANCEF) 2 g/20 mL in sterile water IV syringe  2 g IntraVENous ON Anjelica Michelle MD        oxyCODONE-acetaminophen (PERCOCET) 5-325 mg per tablet 1 Tab  1 Tab Oral Q6H PRN Sarah Álvarez MD   1 Tab at 01/06/19 1625     No Known Allergies  Past Medical History:   Diagnosis Date    Anxiety     Hypertension     Stroke ([address]     1970     No past surgical history on file.   No family history of pacemaker  Social History     Tobacco Use    Smoking status: Current Every Day Smoker    Smokeless tobacco: Never Used   Substance Use Topics    Alcohol use: Yes     Frequency: Never        Review of Systems:   Constitutional: Negative for fever, chills, weight loss, + malaise/fatigue. HEENT: Negative for nosebleeds, vision changes. Respiratory: Negative for cough, hemoptysis  Cardiovascular: Negative for orthopnea, claudication, leg swelling, syncope, and PND. + palpitations & RAZO  Gastrointestinal: Negative for nausea, vomiting, diarrhea, blood in stool and melena. Genitourinary: Negative for dysuria, and hematuria. Musculoskeletal: Negative for myalgias, + right heel arthralgia. Skin: Negative for rash. Heme: Does not bleed or bruise easily. Neurological: Negative for speech change and focal weakness + dizziness     Objective:     Visit Vitals  /67 (BP 1 Location: Left arm, BP Patient Position: Sitting)   Pulse 71   Temp 97.6 °F (36.4 °C)   Resp 16   Wt 127 lb (57.6 kg)   SpO2 98%   BMI 21.13 kg/m²      Physical Exam:   Constitutional: thin woman, appears older than stated age. No respiratory distress. Head: Normocephalic and atraumatic. Eyes: Pupils are equal, round  ENT: hearing normal, edentulous  Neck: supple   Cardiovascular: Normal rate, regular rhythm. Exam reveals no gallop and no friction rub. No murmur heard. Pulmonary/Chest: Effort normal and breath sounds normal. No wheezes. Abdominal: Soft, no tenderness. Musculoskeletal: no edema. Neurological: alert, oriented. Skin: Skin is warm and dry  Psychiatric: normal mood and affect. Behavior is normal. Judgment and thought content normal.         Assessment/Plan:   Ms. Filippo Ramos is a 68-year-old woman with tachy ronnie syndrome, symptomatic with palpitations, RAZO and dizziness. She has paroxysmal atrial flutter/atrial tachycardia and sinus rhythm with complete av block. Troponin elevation likely due to severe LVH & cocaine used with cigarette smoking. Echo showed vigorous LVEF at [facility].   She declined additional stress test, states had 2 at [facility] in 3 years, had normal result & didn't feel well with the chemical given. She meets indication for dual chamber pacemaker and then labetalol or diltiazem (change nifedipine to cardizem)    Risks/benefits of permanent pacemaker discussed with patient & family; they are willing to proceed. Once confirmed atrial flutter/fibrillation on pacemaker she will need anticoagulant for embolic CVA prophylaxis. Risks involved with device implant include but are not limited to bleeding, infection, valvular damage, heart attack, stroke, lung collapse (pneumothorax or hemothorax), heart collapse (pericardial tamponade), heart perforation, kidney failure, death. Elective or emergency surgery may be required to repair some of these complications. Prolonged hospitalization would be required. General anesthesia may be required for the procedure. HAIM Muller  [facility] [place]  01/07/19    Addendum from EP attending:   I have seen, examined patient, and discussed with nurse practitioner, registered nurse, reviewed, updated note and agree with the assessment and plan    I have talked to her this am. Her daughter and  at bedside as well  Vital signs are stable  Exam shows regular rhythm   Lungs clear  She is thin  Assessment and Plan:  Continue to proceed with dual chamber pacer implant and change adalat to cardizem CD   Risks involve device implant include but are not limited to bleeding, infection, valvular damage, heart attack, stroke, lung collapse (pneumothorax or hemothorax), heart collapse (pericardial tamponade), heart perforation, kidney failure, death. Elective or emergency surgery may be required to repair some of these complications. Prolonged hospitalization would be required. General anesthesia may be required for the procedure.       Thank you for involving me in this patient's care and please call with further concerns or questions. Riya Willson M.D.   Electrophysiology/Cardiology  [facility] and [facility] [place]  [facility] [address] [facility], [address]  [phone]

## 2019-01-07 NOTE — PROGRESS NOTES
Transfer to [address] [place] [address] from Procedure Area    Verbal report given to Ariana Mcclellan RN on [facility] being transferred to Cardiac Cath Lab  for routine post - op   Patient is post Pacemaker implant procedure. Patient stable upon transfer to . Report consisted of patients Situation, Background, Assessment and   Recommendations(SBAR). Information from the following report(s) Procedure Summary, Intake/Output, MAR and Cardiac Rhythm Sinus Tach was reviewed with the receiving nurse. Opportunity for questions and clarification was provided. Patient medicated during procedure with orders obtained and verified by Dr. Angie Newton. Refer to patient PROCEDURE REPORT for vital signs, assessment, status, and response during procedure.

## 2019-01-07 NOTE — PROGRESS NOTES
TRANSFER - IN REPORT:    Verbal report received from Aracelis Blake RN(name) on [facility] Care  being received from Room 353unit) for routine progression of care      Report consisted of patients Situation, Background, Assessment and   Recommendations(SBAR). Information from the following report(s) SBAR, MAR, Recent Results and Cardiac Rhythm NSR was reviewed with the receiving nurse. Opportunity for questions and clarification was provided. Assessment completed upon patients arrival to unit and care assumed.

## 2019-01-07 NOTE — PROCEDURES
DATE OF PROCEDURE: 1/7/2019    PROCEDURE: Implantation of dual-chamber pacemaker with contrast venography and fluoroscopy. HISTORY:  This is a 79 y.o.man with dizziness and dyspnea and she has intermittent third degree av block and PSVT. She meets the indication for dual chamber pacer insertion. The risks and benefits were discussed with the patient and her , daughter. She will need cardizem for her SVT and LVH so bradycardia will be irreversible, she agreed to proceed with pacemaker. PROCEDURE IN DETAIL:  After the informed written consent had been obtained, the patient was brought to the Electrophysiology Suite where the patient was prepped and draped in the usual sterile fashion. Conscious sedation was initiated and maintained with intravenous Versed and fentanyl. Local anesthesia with 2% Xylocaine was given in the left infraclavicular area. 10 mL of contrast injected inside the left antecubital vein and the left axillary and left subclavian veins were patent. The #10 blade scalpel was then used to make a 3 cm incision below the left clavicle. Using the modified Seldinger technique and wire retention 1 guidewire was advanced into the left axillary vein. A subcutaneous device pocket was made in the inferomedial direction by blunt dissection. Over the guidewire, a 9-Icelandic peel-away introducer dilator was then advanced inside the vein. An active fixation pacing and sensing lead was positioned in the right ventricular distal septum. The lead was anchored down with #2 Ethibond sutures after the pacing and sensing were optimal.  Over the retained guidewire another 7-Icelandic peel-away introducer dilator was then advanced inside the vein. The active fixation pacing and sensing lead was then advanced and positioned in the right atrial appendage    There was no diaphragmatic stimulation with 10 volts maximal output for either lead.       The pocket is now irrigated with antibiotic solution and the lead was connected to the device and inserted inside the pocket with antibiotic envelope TYRX LOT R [id number]. The pocket is now closed in three consecutive layers using 2-0 Vicryl sutures in continuous fashion. Steri strip is now applied over the surgical wound. COMPLICATIONS: none     ESTIMATED BLOOD LOSS: 10 mL. Specimen removed: NONE    IMPLANTED HARDWARE:      The pacemaker is a Medtronic MRI Tanvi Meadows DR, model # A2DR [id number], serial # [id number]    ATRIAL LEAD: medtronic [id number], serial # [id number]    VENTRICULAR LEAD:  Medtronic model # [id number] and serial # [id number]    DATA:  The P wave is 1.9 mV, pacing impedance 881 ohms and pacing threshold acutely is 1.5 volts at 0.5 ms. The ventricular lead had the R wave sensing 12 mV and the pacing impedance 1960ohms and pacing threshold 1.0 volts at 0.5 ms. PROGRAMMED PARAMETER:  The device is programmed to DDDR pacing mode with the low rate of 60 beats per minute and upper tracking rate of 130 beats per minute. ASSESSMENT AND PLAN:  The patient will follow up with me in 1-2 weeks  Will start her on cardizem CD    Ochoa Bennett M.D.  [facility]  Electrophysiology/Cardiology  [facility] and [facility] [place]  [address] , [address]  [phone]

## 2019-01-07 NOTE — PROGRESS NOTES
TRANSFER - IN REPORT:    Verbal report received from [place] on [facility]  being received from procedure for routine progression of care. Report consisted of patients Situation, Background, Assessment and Recommendations(SBAR). Information from the following report(s) Procedure Summary, Intake/Output, MAR and Recent Results was reviewed with the receiving clinician. Opportunity for questions and clarification was provided. Assessment completed upon patients arrival to [address] [place] [address] and care assumed. Cardiac Cath Lab Recovery Arrival Note:    [facility] arrived to [facility] recovery area. Patient procedure= PPI  Duel chamber  Rate . Patient on cardiac monitor, non-invasive blood pressure, SPO2 monitor. On  O2 @ 2 lpm via n/c. IV  of nacl on pump at 25 ml/hr. Patient status doing well without problems. Patient is sleeping responds to name. Patient reports no complaints. PROCEDURE SITE CHECK:    Procedure site:without any bleeding and or hematoma, no pain/discomfort reported at procedure site. No change in patient status. Continue to monitor patient and status.

## 2019-01-07 NOTE — PROGRESS NOTES
Spiritual Care Assessment/Progress Note  [facility]      NAME: Sunny Blair      MRN: [id number]  AGE: 79 y.o. SEX: female  Yazidism Affiliation:    Language: English     1/7/2019     Total Time (in minutes): 65     Spiritual Assessment begun in [facility] 3N TELEMETRY through conversation with:         [x]Patient        [x] Family    [] Friend(s)        Reason for Consult: Advance medical directive consult     Spiritual beliefs: (Please include comment if needed)     [x] Identifies with a bossman tradition:         [] Supported by a bossman community:            [] Claims no spiritual orientation:           [] Seeking spiritual identity:                [] Adheres to an individual form of spirituality:           [] Not able to assess:                           Identified resources for coping:      [x] Prayer                               [] Music                  [] Guided Imagery     [x] Family/friends                 [] Pet visits     [] Devotional reading                         [] Unknown     [] Other:                                            Interventions offered during this visit: (See comments for more details)    Patient Interventions: Advance medical directive consult, Affirmation of bossman, Affirmation of emotions/emotional suffering, Normalization of emotional/spiritual concerns, Life review/legacy, Guided imagery, Prayer (actual), Prayer (assurance of)     Family/Friend(s):  Affirmation of emotions/emotional suffering, Affirmation of bossman, Life review/legacy, Normalization of emotional/spiritual concerns, Prayer (assurance of), Prayer (actual)     Plan of Care:     [] Support spiritual and/or cultural needs    [] Support AMD and/or advance care planning process      [] Support grieving process   [] Coordinate Rites and/or Rituals    [] Coordination with community clergy   [] No spiritual needs identified at this time   [] Detailed Plan of Care below (See Comments)  [] Make referral to Music Therapy  [] Make referral to Pet Therapy     [] Make referral to Addiction services  [] Make referral to [facility]  [] Make referral to Spiritual Care Partner  [] No future visits requested        [x] Follow up visits as needed     Comments: Initial spiritual assessment in Room 353/01. Patient was laying in bed eating ice cream and her  was at her side. Patient was a little nervious about the procedures she was about to have done today at noon. We talked for a long time about life,God ,and the state of the Lutheran. Provided prayer. Consulted with nurse. Advised of  Availability. Request by patient to assist with Advance Medical Directive (AMD) in Room 353/01. Consulted with patients nurse, patient was alert. Explained document to patient and her  Assisted patient with any question she may have, patient wants to talk it over with the rest of her family. Rev. Terese Garcia.  Nicolás Moran Intern [facility]

## 2019-01-07 NOTE — PROGRESS NOTES
Cardiac Cath Lab Procedure Area Arrival Note:    Karlo Sharma arrived to Cardiac Cath Lab, Procedure Area. Patient identifiers verified with NAME and DATE OF BIRTH. Procedure verified with patient. Consent forms verified. Allergies verified. Patient informed of procedure and plan of care. Questions answered with review. Patient voiced understanding of procedure and plan of care. Patient on cardiac monitor, non-invasive blood pressure, SPO2 monitor. On room air  O2 @ 2 lpm via nasal cannula. IV of NS on pump at 25 ml/hr. Patient status doing well without problems. Patient is A&Ox 4. Patient reports no pain. Patient medicated during procedure with orders obtained and verified by Dr. Carlos Fong. Refer to patients Cardiac Cath Lab PROCEDURE REPORT for vital signs, assessment, status, and response during procedure, printed at end of case. Printed report on chart or scanned into chart.

## 2019-01-07 NOTE — DISCHARGE INSTRUCTIONS
Smoking Cessation Program: This is a free, phone/text/email based, smoking cessation program. The program is individualized to meet each patient's needs. To enroll use the link - [facility]. com/quit or text Sheryl Carcamo to [phone] from any smart phone. PATIENT INSTRUCTIONS POST-PACEMAKER IMPLANT    1. No heavy lifting or exercises with the left arm for 4 weeks. This includes the following:  Do not raise arm above the shoulder level to comb hair, pull on clothes, etc... Do not use the affected arm to pull up or push up from a seated or laying  down position. Do not allow anyone else to pull on the affected arm. 2.  Dressing should remain in place x approximately 1 week. It is typically removed in clinic at follow up. Keep site clean & dry. No shower until after follow up. 3.  Do not drive for 3 days. 4.  Call Dr. Abel Reyes at [phone] if you experience any of the following symptoms:  1. Redness at the pacemaker site  2. Swelling at or around the pacemaker or in the left arm  3. Pain around the pacemaker  4. Dizziness, lightheadedness, fainting spells  5. Lack of energy  6. Shortness of breath  7. Rapid heart rate  8. Chest or muscle twitches    5. Follow-up with Dr. Abel Reyes as scheduled  Future Appointments   Date Time Provider Tami Rosado   1/9/2019 10:00 AM Marah Perez  [address]   1/11/2019  1:00 PM Curtis Lal  [address]   1/18/2019  9:45 AM PACEMAKER3, [address]   1/18/2019 10:00 AM WOUND CHECK, [address]     6. You may use pain medication and ice pack for pain relief as needed. You may wear the sling as a reminder to keep your arm below the your shoulder. Cliff Fenton M.D.  [facility]  Electrophysiology/Cardiology  [facility] and Vascular [place]  [address] [facility]  [place] [id number]  [phone]

## 2019-01-07 NOTE — ACP (ADVANCE CARE PLANNING)
Request by patient to assist with Advance Medical Directive (AMD) in Room 353/01. Consulted with patients nurse, patient was alert. Explained document to patient and her  Assisted patient with any question she may have, patient wants to talk it over with the rest of her family. Rev. Javi Dobbs.  Rosalva Liu [facility]

## 2019-01-08 VITALS
RESPIRATION RATE: 18 BRPM | DIASTOLIC BLOOD PRESSURE: 83 MMHG | OXYGEN SATURATION: 100 % | WEIGHT: 133.4 LBS | SYSTOLIC BLOOD PRESSURE: 153 MMHG | BODY MASS INDEX: 22.2 KG/M2 | HEART RATE: 86 BPM | TEMPERATURE: 98.6 F

## 2019-01-08 PROBLEM — I49.5 TACHY-BRADY SYNDROME (HCC): Status: RESOLVED | Noted: 2019-01-06 | Resolved: 2019-01-08

## 2019-01-08 PROBLEM — R07.9 CHEST PAIN: Status: RESOLVED | Noted: 2019-01-03 | Resolved: 2019-01-08

## 2019-01-08 LAB
ATRIAL RATE: 88 BPM
ATRIAL RATE: 90 BPM
CALCULATED P AXIS, ECG09: 67 DEGREES
CALCULATED P AXIS, ECG09: 73 DEGREES
CALCULATED R AXIS, ECG10: -15 DEGREES
CALCULATED R AXIS, ECG10: -6 DEGREES
CALCULATED T AXIS, ECG11: 38 DEGREES
CALCULATED T AXIS, ECG11: 44 DEGREES
DIAGNOSIS, 93000: NORMAL
DIAGNOSIS, 93000: NORMAL
P-R INTERVAL, ECG05: 148 MS
P-R INTERVAL, ECG05: 148 MS
Q-T INTERVAL, ECG07: 356 MS
Q-T INTERVAL, ECG07: 364 MS
QRS DURATION, ECG06: 54 MS
QRS DURATION, ECG06: 80 MS
QTC CALCULATION (BEZET), ECG08: 430 MS
QTC CALCULATION (BEZET), ECG08: 445 MS
VENTRICULAR RATE, ECG03: 88 BPM
VENTRICULAR RATE, ECG03: 90 BPM

## 2019-01-08 PROCEDURE — 74011250637 HC RX REV CODE- 250/637: Performed by: INTERNAL MEDICINE

## 2019-01-08 PROCEDURE — 74011250637 HC RX REV CODE- 250/637: Performed by: HOSPITALIST

## 2019-01-08 RX ORDER — CEPHALEXIN 500 MG/1
500 CAPSULE ORAL 3 TIMES DAILY
Qty: 12 CAP | Refills: 0 | Status: SHIPPED | OUTPATIENT
Start: 2019-01-08 | End: 2019-04-11 | Stop reason: ALTCHOICE

## 2019-01-08 RX ORDER — DILTIAZEM HYDROCHLORIDE 120 MG/1
120 CAPSULE, COATED, EXTENDED RELEASE ORAL DAILY
Qty: 30 CAP | Refills: 1 | Status: SHIPPED | OUTPATIENT
Start: 2019-01-08 | End: 2022-05-17 | Stop reason: SDUPTHER

## 2019-01-08 RX ADMIN — ASPIRIN 81 MG CHEWABLE TABLET 81 MG: 81 TABLET CHEWABLE at 08:45

## 2019-01-08 RX ADMIN — DILTIAZEM HYDROCHLORIDE 120 MG: 120 CAPSULE, COATED, EXTENDED RELEASE ORAL at 08:45

## 2019-01-08 NOTE — PROGRESS NOTES
Bedside and Verbal shift change report given to Johnny Carpenter (oncoming nurse) by Bekah Herzog (offgoing nurse).  Report included the following information SBAR, Kardex, MAR, Med Rec Status and Cardiac Rhythm SR.

## 2019-01-08 NOTE — PROGRESS NOTES
19; 09:30 -   CM received notice from bedside nurse that patient does not have transportation for discharge. CM met with patient with significant other at bedside, and patient confirmed that her daughter cannot provide discharge transportation. Patient is in agreement to [place] ride. CM set up discharge transportation via [employer] with notes of having someone traveling with the patient,  at discharge location, and request to please call the unit phone when [phone]. Estimated time of arrival is 10:45. Bedside nurse notified of plan.     CRM: Harshal Garcia, MPH, [address] [place] [address]; Z: [phone]

## 2019-01-08 NOTE — DISCHARGE SUMMARY
Discharge Summary       PATIENT ID: Karl Dillard  MRN: [id number]   YOB: 1950    DATE OF ADMISSION: 1/6/2019 12:12 PM    DATE OF DISCHARGE: 1/8/2019    PRIMARY CARE PROVIDER: None     ATTENDING PHYSICIAN: Christen Lucia MD   DISCHARGING PROVIDER: Christen Lucia MD    To contact this individual call [phone] and ask the  to page. If unavailable ask to be transferred the Adult Hospitalist Department. CONSULTATIONS: IP CONSULT TO CARDIOLOGY    PROCEDURES/SURGERIES: * No surgery found *    ADMITTING DIAGNOSES & HOSPITAL COURSE:   addendem to dc note on 1/6/2018    Per card, Dr Jolie Lee:        Addendum from EP attending:   I have seen, examined patient, and discussed with nurse practitioner, registered nurse, reviewed, updated note and agree with the assessment and plan    I have talked to her this am. Her daughter and  at bedside as well  Vital signs are stable  Exam shows regular rhythm   Lungs clear  She is thin  Assessment and Plan:  Continue to proceed with dual chamber pacer implant and change adalat to cardizem CD   Risks involve device implant include but are not limited to bleeding, infection, valvular damage, heart attack, stroke, lung collapse (pneumothorax or hemothorax), heart collapse (pericardial tamponade), heart perforation, kidney failure, death. Elective or emergency surgery may be required to repair some of these complications. Prolonged hospitalization would be required. General anesthesia may be required for the procedure.        Thank you for involving me in this patient's care and please call with further concerns or questions.        Alesha Gaines M.D.   Electrophysiology/Cardiology  [address] [place] [address] [facility]  [place] [address], [facility] [address] [place], [place] [id number] [phone]          Revision History Other wise:    From prior note:    Hospital Course: 82year old female with hx of cocaine us (smoking) presents to the er with chest pain / troponin 0.07 (peak 0.31) and ekg with ? Septal infarct. Pt was admitte for cp workup and cardiology eval.  The pt underwent an echo eval showing ef of 85% and concentric lvh. On the night of 1/4 the pt developed episode of tachybrady rhythm (140-35) with 3rd degree hb during the ronnie rhythm. The pt was assymptomatic at the time. Cardiology recommended trx for pacer evaluation. * Procedures: none  * No surgery found *        Consults: Cardiology     Significant Diagnostic Studies: echo - ef 85% with concentric lvh, ekg with nsr and repolarization abnormalities.     Discharge Exam:  Visit Vitals  /63 (BP 1 Location: Left arm, BP Patient Position: At rest)   Pulse 74   Temp 98.4 °F (36.9 °C)   Resp 16   Ht 5' 5\" (1.651 m)   Wt 54.3 kg (119 lb 12.8 oz)   SpO2 94%   BMI 19.94 kg/m²      General:  Alert, cooperative, no distress, appears stated age. Head:  Normocephalic, without obvious abnormality, atraumatic. Eyes:  Conjunctivae/corneas clear. PERRL, EOMs intact. Fundi benign. Ears:  Normal TMs and external ear canals both ears. Nose: Nares normal. Septum midline. Mucosa normal. No drainage or sinus tenderness. Throat: Lips, mucosa, and tongue normal. Teeth and gums normal.   Neck: Supple, symmetrical, trachea midline, no adenopathy, thyroid: no enlargement/tenderness/nodules, no carotid bruit and no JVD. Back:   Symmetric, no curvature. ROM normal. No CVA tenderness. Lungs:   Clear to auscultation bilaterally. Chest wall:  No tenderness or deformity. Heart:  Regular rate and rhythm, S1, S2 normal, no murmur, click, rub or gallop.                           Extremities: Extremities normal, atraumatic, no cyanosis or edema. Pulses: 2+ and symmetric all extremities. Skin: Skin color, texture, turgor normal. No rashes or lesions.    Lymph nodes: Cervical, supraclavicular, and axillary nodes normal.   Neurologic: CNII-XII intact. Normal strength, sensation and reflexes throughout.         * Discharge Condition: improved  * Disposition: [facility] care facility     Discharge Medications:      Current Discharge Medication List       START taking these medications     Details   aspirin 81 mg chewable tablet Take 1 Tab by mouth daily. Qty: 30 Tab, Refills: 0       NIFEdipine (PROCARDIA) 10 mg capsule Take 1 Cap by mouth three (3) times daily. Qty: 30 Cap, Refills: 1                 * Follow-up Care/Patient Instructions: Activity: Activity as tolerated  Diet: Cardiac Diet  Wound Care: None needed     Follow-up Information      Follow up With Specialties Details Why Contact Info     Wei Giles NP Nurse Practitioner On 1/9/2019 Appointment is scheduled for 10 am. Please arrive 30 mins early. Bring photo ID, insurance card, and list of any medications. [facility]  [phone]        None       None (395) Patient stated that they have no PCP          > 30 minutes spent in coordinating care.     Signed:  Jaquan Salas DO  1/6/2019  11:16 AM              DISCHARGE DIAGNOSES / PLAN:      1.  see above        PENDING TEST RESULTS:   At the time of discharge the following test results are still pending: na    FOLLOW UP APPOINTMENTS:    Follow-up Information     Follow up With Specialties Details Why Contact Info    Ashlie Sifuentes MD Cardiology In 9 days  [address]  [address] [facility] [address]  [facility] 7 [phone]      None    None (395) Patient stated that they have no PCP             ADDITIONAL CARE RECOMMENDATIONS: na    DIET: Resume previous diet     ACTIVITY: as directed     WOUND CARE: as directed     EQUIPMENT needed: na      DISCHARGE MEDICATIONS:  Current Discharge Medication List      START taking these medications    Details   cephALEXin (KEFLEX) 500 mg capsule Take 1 Cap by mouth three (3) times daily.   Qty: 12 Cap, Refills: 0      dilTIAZem CD (CARDIZEM CD) 120 mg ER capsule Take 1 Cap by mouth daily. Qty: 30 Cap, Refills: 1         CONTINUE these medications which have NOT CHANGED    Details   aspirin 81 mg chewable tablet Take 1 Tab by mouth daily. Qty: 30 Tab, Refills: 0         STOP taking these medications       NIFEdipine (PROCARDIA) 10 mg capsule Comments:   Reason for Stopping:                 NOTIFY YOUR PHYSICIAN FOR ANY OF THE FOLLOWING:   Fever over 101 degrees for 24 hours. Chest pain, shortness of breath, fever, chills, nausea, vomiting, diarrhea, change in mentation, falling, weakness, bleeding. Severe pain or pain not relieved by medications. Or, any other signs or symptoms that you may have questions about.     DISPOSITION:    Home With:   OT  PT  HH  RN       Long term SNF/Inpatient Rehab   x Independent/assisted living    Hospice    Other:       PATIENT CONDITION AT DISCHARGE:     Functional status    Poor     Deconditioned    x Independent      Cognition   x  Lucid     Forgetful     Dementia      Catheters/lines (plus indication)    Velazquez     PICC     PEG    x None      Code status    x Full code     DNR      PHYSICAL EXAMINATION AT DISCHARGE:   Refer to Progress Note  Visit Vitals  /83 (BP 1 Location: Right arm, BP Patient Position: At rest)   Pulse 86   Temp 98.6 °F (37 °C)   Resp 18   Wt 60.5 kg (133 lb 6.4 oz)   SpO2 100%   BMI 22.20 kg/m²          CHRONIC MEDICAL DIAGNOSES:  Problem List as of 1/8/2019 Date Reviewed: 1/8/2019          Codes Class Noted - Resolved    Coc[facility] abuse ([facility] ICD-10-CM: F14.10  ICD-9-CM: 305.60  1/6/2019 - Present        RESOLVED: Tachy-ronnie syndrome ([facility] ICD-10-CM: I49.5  ICD-9-CM: 427.81  1/6/2019 - 1/8/2019        RESOLVED: Chest pain ICD-10-CM: R07.9  ICD-9-CM: 786.50  1/3/2019 - 1/8/2019              Greater than 20  minutes were spent with the patient on counseling and coordination of care    Signed:   Kelly Basilio MD  1/8/2019  7:39 AM

## 2019-01-08 NOTE — PROGRESS NOTES
Patient s/p PPM placement, c/o L arm pain, stated Percocet doesn't help at all with pain and gives her a headache and she wants something else. Paged Dr. Herrera Lorenzo, ordered one time dose 2mg PO Dilaudid. RN tried to flush patient IV to administer 2[address], and patient screamed and said it burned. I attempted x2, then told patient she'll have to get another IV for abx, she yelled \"no I will not, I'm already in enough pain\". Patient will not let me flush IV, but there is good blood return. Patient agreed to leave IV in for now and let me attempt to flush again later to give abx. Dr. Herrera Lorenzo made aware that abx cannot be administered at this time. Med signed as HELD in MAR.    0700: attempted to give morning iv abx, went to flush iv again and patient said it hurts, wants it taken out. Will pass this along to day shift team. Patient did not get any abx over the night.

## 2019-01-08 NOTE — PROGRESS NOTES
Problem: Pacer/ICD: Day 1  Goal: Activity/Safety  Outcome: Progressing Towards Goal  L arm sling in place, patient getting prn pain meds.  Minor oozing on dressing

## 2019-01-09 ENCOUNTER — OFFICE VISIT (OUTPATIENT)
Dept: INTERNAL MEDICINE CLINIC | Age: 69
End: 2019-01-09

## 2019-01-09 VITALS
SYSTOLIC BLOOD PRESSURE: 109 MMHG | RESPIRATION RATE: 18 BRPM | OXYGEN SATURATION: 98 % | DIASTOLIC BLOOD PRESSURE: 76 MMHG | TEMPERATURE: 97.6 F | WEIGHT: 134.8 LBS | HEART RATE: 104 BPM | HEIGHT: 65 IN | BODY MASS INDEX: 22.46 KG/M2

## 2019-01-09 DIAGNOSIS — Z95.0 S/P PLACEMENT OF CARDIAC PACEMAKER: Primary | ICD-10-CM

## 2019-01-09 RX ORDER — ARIPIPRAZOLE 10 MG/1
10 TABLET ORAL DAILY
COMMUNITY

## 2019-01-09 RX ORDER — ACETAMINOPHEN 500 MG
500 TABLET ORAL
Qty: 60 TAB | Refills: 0 | Status: SHIPPED | OUTPATIENT
Start: 2019-01-09 | End: 2019-04-11 | Stop reason: ALTCHOICE

## 2019-01-09 NOTE — PATIENT INSTRUCTIONS
1. Please get your medications from the hospital. 
2. Do not miss your follow up with the heart doctor on 1/18/19. 3. Do not remove your dressing until you see them, but call them if you have increased drainage to the site. Please get your antibiotic 4. If you have questions about the device at home - call the heart doctor 5. DO NOT DO ANY HEAVY LIFTING until cleared by your heart doctor

## 2019-01-09 NOTE — PROGRESS NOTES
Pt here for Chief Complaint Patient presents with  
[facility] Follow Up Pace maker put in 1. Have you been to the ER, urgent care clinic since your last visit? Hospitalized since your last visit? Yes When: [facility] Thursday morning 2. Have you seen or consulted any other health care providers outside of the [address] [place] [address] since your last visit? Include any pap smears or colon screening. No  
 
 
 
 
Pt c/o pain 10 of 10, pt denies taking anything for pain today PHQ over the last two weeks 1/9/2019 PHQ Not Done Medical Reason (indicate in comments)

## 2019-01-09 NOTE — PROGRESS NOTES
Subjective: (As above and below) Chief Complaint Patient presents with  
[facility] Follow Up Pace maker put in  
 
Dyllan Oquendo is a 79y.o. year old female who presents to establish care and follow up from hospitalization. She was admitted from: 1/3 to 1/8 for pacemaker placement. She presented w/ chest pain - elevated troponin, found to have 3rd degree hb/tachybrady rhythm. A dual chamber pacemaker was placed on 1/7/19. She has f/u w/ cardio next week. She was discharged on cardizem and keflex - she has not yet picked up prescription. She presents today w/ her  She is a cocaine user. Has not used since hospital discharge. She also has not resumed cigarettes since discharge. She is complaining primarily of itching to site. She denies any shortness of breath, chest pain. She states that yesterday she was in the grocery store and an employee there made her mad- she felt her heart racing and was getting very angry- her son helped calm her down and symptoms resolved. She denies fevers, increased drainage to site. In addition to substance abuse, she has a psychiatric disorder ?schizophrenia per previous notes - she is establishing care w/ psychiatry Selina Gann. .. Reviewed PmHx, RxHx, FmHx, SocHx, AllgHx and updated in chart. History reviewed. No pertinent family history. Past Medical History:  
Diagnosis Date  Anxiety  CAD (coronary artery disease)  Heart failure ([facility]  Hypertension  Psychiatric disorder   
 depression  Stroke ([facility]  Stroke ([facility] 1970 Social History Socioeconomic History  Marital status:  Spouse name: Not on file  Number of children: Not on file  Years of education: Not on file  Highest education level: Not on file Tobacco Use  Smoking status: Former Smoker  Smokeless tobacco: Never Used  Tobacco comment: 12/2019 Substance and Sexual Activity  Alcohol use:  Yes  
 Frequency: Never  Drug use: Yes Types: Marijuana, Cocaine Comment: new years kwadwo - last use  Sexual activity: No  
Social History Narrative ** Merged History Encounter **  
    
  
 
 
Current Outpatient Medications Medication Sig  ARIPiprazole (ABILIFY) 10 mg tablet Take 10 mg by mouth daily.  acetaminophen (TYLENOL) 500 mg tablet Take 1 Tab by mouth every six (6) hours as needed for Pain.  cephALEXin (KEFLEX) 500 mg capsule Take 1 Cap by mouth three (3) times daily.  dilTIAZem CD (CARDIZEM CD) 120 mg ER capsule Take 1 Cap by mouth daily.  aspirin 81 mg chewable tablet Take 1 Tab by mouth daily. No current facility-administered medications for this visit. Review of Systems:  
Constitutional:    Negative for fever and chills, negative diaphoresis. HEENT:              Negative for neck pain and stiffness. Eyes:                  Negative for visual disturbance, itching, redness or discharge. Respiratory:        Negative for cough and shortness of breath. Cardiovascular:  Negative for chest pain and palpitations. Gastrointestinal: Negative for nausea, vomiting, abdominal pain, diarrhea or constipation. Genitourinary:     Negative for dysuria and frequency. Musculoskeletal: Negative for falls, tenderness and swelling. Skin:                    Negative for rash, masses or lesions. Neurological:       Negative for dizzyness, seizure, loss of consciousness, weakness and numbness. Objective:  
 
Vitals:  
 01/09/19 1059 BP: 109/76 Pulse: (!) 104 Resp: 18 Temp: 97.6 °F (36.4 °C) TempSrc: Oral  
SpO2: 98% Weight: 134 lb 12.8 oz (61.1 kg) Height: 5' 5\" (1.651 m) Results for orders placed or performed during the hospital encounter of 01/06/19 METABOLIC PANEL, COMPREHENSIVE Result Value Ref Range Sodium 137 136 - 145 mmol/L Potassium 3.7 3.5 - 5.1 mmol/L  Chloride 105 97 - 108 mmol/L  
 CO2 27 21 - 32 mmol/L  
 Anion gap 5 5 - 15 mmol/L Glucose 68 65 - 100 mg/dL BUN 13 6 - 20 MG/DL Creatinine 0.93 0.55 - 1.02 MG/DL  
 BUN/Creatinine ratio 14 12 - 20 GFR est AA >60 >60 ml/min/1.73m2 GFR est non-AA >60 >60 ml/min/1.73m2 Calcium 9.0 8.5 - 10.1 MG/DL Bilirubin, total 0.4 0.2 - 1.0 MG/DL  
 ALT (SGPT) 16 12 - 78 U/L  
 AST (SGOT) 15 15 - 37 U/L Alk. phosphatase 116 45 - 117 U/L Protein, total 8.3 (H) 6.4 - 8.2 g/dL Albumin 3.2 (L) 3.5 - 5.0 g/dL Globulin 5.1 (H) 2.0 - 4.0 g/dL A-G Ratio 0.6 (L) 1.1 - 2.2 MAGNESIUM Result Value Ref Range Magnesium 2.2 1.6 - 2.4 mg/dL SAMPLES BEING HELD Result Value Ref Range SAMPLES BEING HELD 1PST COMMENT Add-on orders for these samples will be processed based on acceptable specimen integrity and analyte stability, which may vary by analyte. EKG, 12 LEAD, INITIAL Result Value Ref Range Ventricular Rate 75 BPM  
 Atrial Rate 75 BPM  
 P-R Interval 150 ms QRS Duration 68 ms Q-T Interval 366 ms  
 QTC Calculation (Bezet) 408 ms Calculated P Axis 54 degrees Calculated R Axis -14 degrees Calculated T Axis 37 degrees Diagnosis Normal sinus rhythm When compared with ECG of 03-JUN-1999 22:03, 
Questionable change in QRS axis Confirmed by Afia Walker MD ([id number]) on 1/7/2019 3:01:39 PM 
  
 
 
 
Physical Examination: General appearance - alert, well appearing, and in no distress Chest - clear to auscultation, no wheezes, rales or rhonchi, symmetric air entry Heart - normal rate, regular rhythm, normal S1, S2, no murmurs, rubs, clicks or gallops Extremities - no pedal edema noted Skin - normal coloration and turgor, no rashes, no suspicious skin lesions noted Dressing to chest wall shows small amount of bloody drainage, not extending past outline drawn on   
 
Assessment/ Plan:  
Follow-up Disposition: Not on File She asks to be cleared to return to work (works as a )- explained to patient that she is not to do any heavy lifting and must wait until her cardiology appt to be cleared. She also asks that dressing be removed - again, explained that she is NOT to remove dressing until seen by cardiology. Advised she call cardio increased drainage. ED if chest pain, palpitations, sob Encouraged efforts at sobriety, and f/u w/ psychiatry 1. S/P placement of cardiac pacemaker Reviewed upcoming appt w/ patient and case-worker for cardiology 
 
- acetaminophen (TYLENOL) 500 mg tablet; Take 1 Tab by mouth every six (6) hours as needed for Pain. Dispense: 60 Tab; Refill: 0 I have discussed the diagnosis with the patient and the intended plan as seen in the above orders. The patient has received an after-visit summary and questions were answered concerning future plans. Pt conveyed understanding of plan. Medication Side Effects and Warnings were discussed with patient: yes Patient Labs were reviewed: yes Patient Past Records were reviewed:  yes Raisa Strong NP

## 2019-01-10 PROBLEM — Z95.0 S/P PLACEMENT OF CARDIAC PACEMAKER: Status: ACTIVE | Noted: 2019-01-10

## 2019-01-18 ENCOUNTER — CLINICAL SUPPORT (OUTPATIENT)
Dept: CARDIOLOGY CLINIC | Age: 69
End: 2019-01-18

## 2019-01-18 ENCOUNTER — OFFICE VISIT (OUTPATIENT)
Dept: CARDIOLOGY CLINIC | Age: 69
End: 2019-01-18

## 2019-01-18 DIAGNOSIS — Z95.0 CARDIAC PACEMAKER IN SITU: Primary | ICD-10-CM

## 2019-01-18 DIAGNOSIS — I44.2 COMPLETE AV BLOCK (HCC): Primary | ICD-10-CM

## 2019-01-18 DIAGNOSIS — Z95.0 S/P PLACEMENT OF CARDIAC PACEMAKER: ICD-10-CM

## 2019-01-18 NOTE — PROGRESS NOTES
Cardiac Electrophysiology Wound Check Note      Wound Check   Patient is here for wound check from pacemaker. No fever, drainage   Physical Exam   Constitutional: well-developed and well-nourished. Skin: Left side pocket is healing without redness, drainage, hematoma. The wound is intact and edges approximated. Dressing and steri strips removed. ASSESSMENT and PLAN   The incision is healing without redness, drainage, hematoma. Site intact. The patient has finished their anti-biotic and been compliant with arm restrictions. They will continue arms restrictions for 3 more weeks.   current treatment plan is effective, no change in therapy   Device check shows proper lead and generator functions    Follow-up Disposition:  Return 3 months I will check via device clinic or remote monitoring in the future

## 2019-03-20 ENCOUNTER — HOSPITAL ENCOUNTER (EMERGENCY)
Age: 69
Discharge: HOME | End: 2019-03-20
Attending: EMERGENCY MEDICINE
Payer: MEDICARE

## 2019-03-20 ENCOUNTER — DOCUMENTATION ONLY (OUTPATIENT)
Dept: CASE MANAGEMENT | Age: 69
End: 2019-03-20

## 2019-03-20 ENCOUNTER — APPOINTMENT (OUTPATIENT)
Dept: GENERAL RADIOLOGY | Age: 69
End: 2019-03-20
Attending: EMERGENCY MEDICINE
Payer: MEDICARE

## 2019-03-20 VITALS
TEMPERATURE: 98 F | SYSTOLIC BLOOD PRESSURE: 95 MMHG | DIASTOLIC BLOOD PRESSURE: 64 MMHG | WEIGHT: 137 LBS | HEIGHT: 65 IN | OXYGEN SATURATION: 100 % | HEART RATE: 73 BPM | RESPIRATION RATE: 18 BRPM | BODY MASS INDEX: 22.82 KG/M2

## 2019-03-20 DIAGNOSIS — R07.9 CHEST PAIN, UNSPECIFIED TYPE: Primary | ICD-10-CM

## 2019-03-20 DIAGNOSIS — F14.10 COCAINE ABUSE (HCC): ICD-10-CM

## 2019-03-20 LAB
ALBUMIN SERPL-MCNC: 3.2 G/DL (ref 3.5–5)
ALBUMIN/GLOB SERPL: 0.7 {RATIO} (ref 1.1–2.2)
ALP SERPL-CCNC: 105 U/L (ref 45–117)
ALT SERPL-CCNC: 10 U/L (ref 12–78)
ANION GAP SERPL CALC-SCNC: 8 MMOL/L (ref 5–15)
AST SERPL-CCNC: 11 U/L (ref 15–37)
ATRIAL RATE: 83 BPM
BASOPHILS # BLD: 0 K/UL (ref 0–0.1)
BASOPHILS NFR BLD: 1 % (ref 0–1)
BILIRUB SERPL-MCNC: 0.3 MG/DL (ref 0.2–1)
BUN SERPL-MCNC: 17 MG/DL (ref 6–20)
BUN/CREAT SERPL: 19 (ref 12–20)
CALCIUM SERPL-MCNC: 9.4 MG/DL (ref 8.5–10.1)
CALCULATED P AXIS, ECG09: 30 DEGREES
CALCULATED R AXIS, ECG10: -20 DEGREES
CALCULATED T AXIS, ECG11: 31 DEGREES
CHLORIDE SERPL-SCNC: 108 MMOL/L (ref 97–108)
CK MB CFR SERPL CALC: NORMAL % (ref 0–2.5)
CK MB SERPL-MCNC: <1 NG/ML (ref 5–25)
CK SERPL-CCNC: 42 U/L (ref 26–192)
CO2 SERPL-SCNC: 24 MMOL/L (ref 21–32)
COMMENT, HOLDF: NORMAL
CREAT SERPL-MCNC: 0.88 MG/DL (ref 0.55–1.02)
DIAGNOSIS, 93000: NORMAL
DIFFERENTIAL METHOD BLD: ABNORMAL
EOSINOPHIL # BLD: 0.1 K/UL (ref 0–0.4)
EOSINOPHIL NFR BLD: 2 % (ref 0–7)
ERYTHROCYTE [DISTWIDTH] IN BLOOD BY AUTOMATED COUNT: 14.7 % (ref 11.5–14.5)
GLOBULIN SER CALC-MCNC: 4.8 G/DL (ref 2–4)
GLUCOSE SERPL-MCNC: 93 MG/DL (ref 65–100)
HCT VFR BLD AUTO: 38.1 % (ref 35–47)
HGB BLD-MCNC: 11.6 G/DL (ref 11.5–16)
IMM GRANULOCYTES # BLD AUTO: 0 K/UL (ref 0–0.04)
IMM GRANULOCYTES NFR BLD AUTO: 0 % (ref 0–0.5)
LYMPHOCYTES # BLD: 2.6 K/UL (ref 0.8–3.5)
LYMPHOCYTES NFR BLD: 44 % (ref 12–49)
MCH RBC QN AUTO: 22.4 PG (ref 26–34)
MCHC RBC AUTO-ENTMCNC: 30.4 G/DL (ref 30–36.5)
MCV RBC AUTO: 73.4 FL (ref 80–99)
MONOCYTES # BLD: 0.6 K/UL (ref 0–1)
MONOCYTES NFR BLD: 10 % (ref 5–13)
NEUTS SEG # BLD: 2.5 K/UL (ref 1.8–8)
NEUTS SEG NFR BLD: 43 % (ref 32–75)
NRBC # BLD: 0 K/UL (ref 0–0.01)
NRBC BLD-RTO: 0 PER 100 WBC
P-R INTERVAL, ECG05: 138 MS
PLATELET # BLD AUTO: 278 K/UL (ref 150–400)
PMV BLD AUTO: 9.7 FL (ref 8.9–12.9)
POTASSIUM SERPL-SCNC: 3.9 MMOL/L (ref 3.5–5.1)
PROT SERPL-MCNC: 8 G/DL (ref 6.4–8.2)
Q-T INTERVAL, ECG07: 364 MS
QRS DURATION, ECG06: 64 MS
QTC CALCULATION (BEZET), ECG08: 427 MS
RBC # BLD AUTO: 5.19 M/UL (ref 3.8–5.2)
SAMPLES BEING HELD,HOLD: NORMAL
SODIUM SERPL-SCNC: 140 MMOL/L (ref 136–145)
TROPONIN I BLD-MCNC: <0.04 NG/ML (ref 0–0.08)
TROPONIN I SERPL-MCNC: <0.05 NG/ML
VENTRICULAR RATE, ECG03: 83 BPM
WBC # BLD AUTO: 5.8 K/UL (ref 3.6–11)

## 2019-03-20 PROCEDURE — 82550 ASSAY OF CK (CPK): CPT

## 2019-03-20 PROCEDURE — 99285 EMERGENCY DEPT VISIT HI MDM: CPT

## 2019-03-20 PROCEDURE — 93005 ELECTROCARDIOGRAM TRACING: CPT

## 2019-03-20 PROCEDURE — 74011250637 HC RX REV CODE- 250/637: Performed by: EMERGENCY MEDICINE

## 2019-03-20 PROCEDURE — 36415 COLL VENOUS BLD VENIPUNCTURE: CPT

## 2019-03-20 PROCEDURE — 80053 COMPREHEN METABOLIC PANEL: CPT

## 2019-03-20 PROCEDURE — 84484 ASSAY OF TROPONIN QUANT: CPT

## 2019-03-20 PROCEDURE — 85025 COMPLETE CBC W/AUTO DIFF WBC: CPT

## 2019-03-20 PROCEDURE — 71045 X-RAY EXAM CHEST 1 VIEW: CPT

## 2019-03-20 RX ORDER — GUAIFENESIN 100 MG/5ML
325 LIQUID (ML) ORAL
Status: COMPLETED | OUTPATIENT
Start: 2019-03-20 | End: 2019-03-20

## 2019-03-20 RX ADMIN — ASPIRIN 81 MG CHEWABLE TABLET 324 MG: 81 TABLET CHEWABLE at 06:52

## 2019-03-20 NOTE — ED NOTES
Gave bedside report regarding, SBAR, MAR, and plan of care to Shana Vidal, [address] [place] [address]. Transfered care of patient to RN.

## 2019-03-20 NOTE — PROGRESS NOTES
SSED/CM consult received and appreciated. Nursing provided assistance w/ transportation home [employer].

## 2019-03-20 NOTE — ED NOTES
RN reviewed discharge instructions with patient. Patient verbalized understanding. Time allotted for questions. A&O at time of discharge. VSS. Patient ambulatory off unit. Spoke with patient at length about the importance of stopping drug use. Called [employer] for patient and  transport home. Ref number: [id number] Patient to wait in ER waiting area.

## 2019-03-20 NOTE — ED PROVIDER NOTES
79 y.o. female with past medical history significant for CAD, CHF, stroke, depression, anxiety, and HTN who presents from home via EMS with chief complaint of chest pain. Patient was admitted 1/3/19-1/8/19 for chest pain, anxiety, and cocaine abuse at [facility]. She was found to have a heart block, transferred here, and had a pacemaker placed. She was discharged on a baby ASA and cardizem. Patient arrives today stating she had a nightmare last night and woke up about 2 hours ago with \"shocking\" chest pains. Patient states she \"was shocked\" with pain about 10 times, and has continued residual pain. Patient states she has been out of her \"heart pills\" for about one month, she has prescriptions ready at the pharmacy, she has a  that takes her to get them but just hasn't arranged it yet. Patient reports smoking cocaine regularly, last use was last night. Patient denies any SOB, fever, chills, vomiting, or diarrhea. There are no other acute medical concerns at this time. Social hx: Former smoker; No EtOH use; Currently smokes cocaine Cardiologist: Corinne Luria, MD 
 
Note written by Brent Linder, as dictated by Iza Cabrera MD 6:18 AM 
 
The history is provided by the patient, medical records and the EMS personnel. No  was used. Past Medical History:  
Diagnosis Date  Anxiety  CAD (coronary artery disease)  Heart failure ([facility] [facility]  Hypertension  Psychiatric disorder   
 depression  Stroke ([facility]  Stroke ([facility] [address] 1970 Past Surgical History:  
Procedure Laterality Date  CARDIAC SURG PROCEDURE UNLIST  HX GYN    
 tubal ligation  [place] NEW/RPLCMT PRM PM W/TRANSV ELTRD ATRIAL&VENT  1/7/2019 History reviewed. No pertinent family history. Social History Socioeconomic History  Marital status:  Spouse name: Not on file  Number of children: Not on file  Years of education: Not on file  Highest education level: Not on file Occupational History  Not on file Social Needs  Financial resource strain: Not on file  Food insecurity:  
  Worry: Not on file Inability: Not on file  Transportation needs:  
  Medical: Not on file Non-medical: Not on file Tobacco Use  Smoking status: Former Smoker  Smokeless tobacco: Never Used  Tobacco comment: 12/2019 Substance and Sexual Activity  Alcohol use: Not Currently Frequency: Never  Drug use: Yes Types: Marijuana, Cocaine Comment: new years kwadwo - last use  Sexual activity: Never Lifestyle  Physical activity:  
  Days per week: Not on file Minutes per session: Not on file  Stress: Not on file Relationships  Social connections:  
  Talks on phone: Not on file Gets together: Not on file Attends [employer] service: Not on file Active member of club or organization: Not on file Attends meetings of clubs or organizations: Not on file Relationship status: Not on file  Intimate partner violence:  
  Fear of current or ex partner: Not on file Emotionally abused: Not on file Physically abused: Not on file Forced sexual activity: Not on file Other Topics Concern  Not on file Social History Narrative ** Merged History Encounter ** ALLERGIES: Patient has no known allergies. Review of Systems Constitutional: Negative for chills and fever. Respiratory: Negative for shortness of breath. Cardiovascular: Positive for chest pain. Gastrointestinal: Negative for diarrhea and vomiting. All other systems reviewed and are negative. Vitals:  
 03/20/19 9470 BP: 101/65 Pulse: 83 Resp: 16 Temp: 98.3 °F (36.8 °C) SpO2: 98% Weight: 62.1 kg (137 lb) Height: 5' 5\" (1.651 m) Physical Exam  
Constitutional: She is oriented to person, place, and time.  She appears well-developed and well-nourished. NAD, AxOx4, speaking in complete sentences 
gcs = 15 HENT:  
Head: Normocephalic and atraumatic. Mouth/Throat: Oropharynx is clear and moist.  
Cn intact No teeth Eyes: Pupils are equal, round, and reactive to light. Conjunctivae and EOM are normal. Right eye exhibits no discharge. Left eye exhibits no discharge. No scleral icterus. Neck: Normal range of motion. Neck supple. No JVD present. No tracheal deviation present. Cardiovascular: Normal rate, regular rhythm, normal heart sounds and intact distal pulses. Exam reveals no gallop and no friction rub. No murmur heard. L ant CW - noted pacer/ nttp Pulmonary/Chest: Effort normal and breath sounds normal. No stridor. No respiratory distress. She has no wheezes. She has no rales. She exhibits no tenderness. Abdominal: Soft. Bowel sounds are normal. There is no tenderness. There is no rebound and no guarding. nttp Genitourinary: No vaginal discharge found. Genitourinary Comments: Pt denies urinary/ vaginal complaints Musculoskeletal: Normal range of motion. She exhibits no edema or tenderness. Neurological: She is alert and oriented to person, place, and time. She displays normal reflexes. No cranial nerve deficit or sensory deficit. She exhibits normal muscle tone. Coordination normal.  
pt has motor/ CV/ Sensation grossly intact to all extremities, R = L in strength;  
Skin: Skin is warm and dry. Capillary refill takes less than 2 seconds. No rash noted. No erythema. No pallor. Psychiatric: She has a normal mood and affect. Her behavior is normal. Thought content normal. She expresses impulsivity and inappropriate judgment. Nursing note and vitals reviewed. MDM Procedures Chief Complaint Patient presents with  Chest Pain 7:16 AM 
The patients presenting problems have been discussed, and they are in agreement with the care plan formulated and outlined with them.   I have encouraged them to ask questions as they arise throughout their visit. MEDICATIONS GIVEN: 
Medications  
aspirin chewable tablet 324 mg (324 mg Oral Given 3/20/19 9561) LABS REVIEWED: 
Labs Reviewed CBC WITH AUTOMATED DIFF - Abnormal; Notable for the following components:  
    Result Value MCV 73.4 (*) MCH 22.4 (*)   
 RDW 14.7 (*) All other components within normal limits TROPONIN I  
METABOLIC PANEL, COMPREHENSIVE  
SAMPLES BEING HELD  
CK W/ CKMB & INDEX RADIOLOGY RESULTS: 
The following have been ordered and reviewed: 
_____________________________________________________________________ 
_____________________________________________________________________ EKG interpretation:  
Rhythm: normal sinus rhythm; and ir-regular due to sinus arrhythmia  . Rate (approx.): 83; Axis: normal; P wave: normal; QRS interval: normal ; ST/T wave: normal; Negative acute significant segmental elevations/ compared to study dated 01/06/2019 noted improved st segment elevations in leads V2/ V3 
 
PROCEDURES: 
 
 
 
CONSULTATIONS:  
 
 
PROGRESS NOTES: 
 
DIAGNOSIS: 
 
No diagnosis found. PLAN: 
1- 
 
 
ED COURSE: The patients hospital course has been uncomplicated. 8:08 AM 
Lab work is unremarkable. Will get a repeat troponin at 1000.  
  
10:02 AM 
Repeat POC troponin is negative. .  
 
10:15 AM interrogation negative; Pt agrees w. Plans; Marianne Herrera's  results have been reviewed with her. She has been counseled regarding her diagnosis. She verbally conveys understanding and agreement of the signs, symptoms, diagnosis, treatment and prognosis and additionally agrees to Call/ Arrange follow up as recommended with Dr. None in 24 - 48 hours. She also agrees with the care-plan and conveys that all of her questions have been answered.   I have also put together some discharge instructions for her that include: 1) educational information regarding their diagnosis, 2) how to care for their diagnosis at home, as well a 3) list of reasons why they would want to return to the ED prior to their follow-up appointment, should their condition change or for concerns.

## 2019-03-20 NOTE — DISCHARGE INSTRUCTIONS
Patient Education        Chest Pain: Care Instructions  Your Care Instructions    There are many things that can cause chest pain. Some are not serious and will get better on their own in a few days. But some kinds of chest pain need more testing and treatment. Your doctor may have recommended a follow-up visit in the next 8 to 12 hours. If you are not getting better, you may need more tests or treatment. Even though your doctor has released you, you still need to watch for any problems. The doctor carefully checked you, but sometimes problems can develop later. If you have new symptoms or if your symptoms do not get better, get medical care right away. If you have worse or different chest pain or pressure that lasts more than 5 minutes or you passed out (lost consciousness), call 911 or seek other emergency help right away. A medical visit is only one step in your treatment. Even if you feel better, you still need to do what your doctor recommends, such as going to all suggested follow-up appointments and taking medicines exactly as directed. This will help you recover and help prevent future problems. How can you care for yourself at home? · Rest until you feel better. · Take your medicine exactly as prescribed. Call your doctor if you think you are having a problem with your medicine. · Do not drive after taking a prescription pain medicine. When should you call for help? Call 911 if:    · You passed out (lost consciousness).     · You have severe difficulty breathing.     · You have symptoms of a heart attack. These may include:  ? Chest pain or pressure, or a strange feeling in your chest.  ? Sweating. ? Shortness of breath. ? Nausea or vomiting. ? Pain, pressure, or a strange feeling in your back, neck, jaw, or upper belly or in one or both shoulders or arms. ? Lightheadedness or sudden weakness. ? A fast or irregular heartbeat.   After you call 911, the  may tell you to chew 1 adult-strength or 2 to 4 low-dose aspirin. Wait for an ambulance. Do not try to drive yourself.    Call your doctor today if:    · You have any trouble breathing.     · Your chest pain gets worse.     · You are dizzy or lightheaded, or you feel like you may faint.     · You are not getting better as expected.     · You are having new or different chest pain. Where can you learn more? Go to http://court-sarah.info/. Enter A120 in the search box to learn more about \"Chest Pain: Care Instructions. \"  Current as of: September 23, 2018  Content Version: 11.9  © 6505-6523 [employer]. Care instructions adapted under license by [employer] (which disclaims liability or warranty for this information). If you have questions about a medical condition or this instruction, always ask your healthcare professional. [place] [address] any warranty or liability for your use of this information. Patient Education        Cocaine Misuse: Care Instructions  Your Care Instructions    Using cocaine can cause physical and mental harm. It can increase your heart rate and blood pressure, which can lead to a heart attack and even death. It can raise your body temperature. You may have nausea, vomiting, and chills. If you smoke cocaine, the fumes can cause breathing problems. If you snort cocaine, it can damage your nasal passages. If you inject cocaine, it can cause an abscess at the injection site or an infection throughout your body. You may become shaky and restless. You also may see or hear things that are not there (hallucinations), or believe things that are not true. When the doctor treated you, he or she may have:  · Watched your symptoms or done tests to find out how much cocaine was in your body. · Treated you to control your heart rate, temperature, and blood pressure. · Given you oxygen to help you breathe.   · Given you medicine to settle your thoughts and help keep you calm. The doctor has checked you carefully, but problems can develop later. If you notice any problems or new symptoms, get medical treatment right away. How can you care for yourself at home? · When you use cocaine regularly, your body and brain get used to it. This is called dependency. If you are dependent on this drug, you may have withdrawal symptoms when you stop using it. You may feel drowsy, have vivid dreams, or feel hungry, tired, or depressed. You may also feel confused and have trouble thinking clearly. To help get past these symptoms:  ? Get plenty of rest.  ? Drink lots of fluids. ? Stay active, but don't tire yourself. ? Eat a healthy diet. · Talk to your doctor about drug counseling programs that can help you stop using cocaine. · When you stop using cocaine, you may develop abstinence syndrome, which can last for months. Symptoms of this may include feeling depressed, being tired, having trouble concentrating, and craving cocaine. When should you call for help? Call 911 anytime you think you may need emergency care. For example, call if:    · You have symptoms of a heart attack. These may include:  ? Chest pain or pressure, or a strange feeling in the chest.  ? Sweating. ? Shortness of breath. ? Nausea or vomiting. ? Pain, pressure, or a strange feeling in the back, neck, jaw, or upper belly or in one or both shoulders or arms. ? A fast or irregular heartbeat. After you call 911, the  may tell you to chew 1 adult-strength or 2 to 4 low-dose aspirin. Wait for an ambulance. Do not try to drive yourself.     · You feel you cannot stop from hurting yourself or someone else.   [facility] your doctor now or seek immediate medical care if:    · You have severe side effects from using cocaine.  These may include problems with thinking, such as seeing things that aren't there or thinking that someone is trying to harm you (paranoia).     · You have new or worse withdrawal symptoms.    Watch closely for changes in your health, and be sure to contact your doctor if:    · You need more help or support to stop. Where can you learn more? Go to http://court-sarah.info/. Enter R344 in the search box to learn more about \"Cocaine Misuse: Care Instructions. \"  Current as of: May 7, 2018  Content Version: 11.9  © 2398-8886 [employer]. Care instructions adapted under license by [employer] (which disclaims liability or warranty for this information). If you have questions about a medical condition or this instruction, always ask your healthcare professional. [place] [address] any warranty or liability for your use of this information.

## 2019-03-22 ENCOUNTER — TELEPHONE (OUTPATIENT)
Dept: CASE MANAGEMENT | Age: 69
End: 2019-03-22

## 2019-04-11 ENCOUNTER — OFFICE VISIT (OUTPATIENT)
Dept: INTERNAL MEDICINE CLINIC | Age: 69
End: 2019-04-11

## 2019-04-11 VITALS
DIASTOLIC BLOOD PRESSURE: 57 MMHG | WEIGHT: 131.1 LBS | HEIGHT: 65 IN | SYSTOLIC BLOOD PRESSURE: 103 MMHG | HEART RATE: 70 BPM | TEMPERATURE: 97.6 F | BODY MASS INDEX: 21.84 KG/M2 | RESPIRATION RATE: 18 BRPM

## 2019-04-11 DIAGNOSIS — Z12.11 COLON CANCER SCREENING: ICD-10-CM

## 2019-04-11 DIAGNOSIS — Z11.59 SCREENING FOR VIRAL DISEASE: ICD-10-CM

## 2019-04-11 DIAGNOSIS — Z00.00 MEDICARE ANNUAL WELLNESS VISIT, SUBSEQUENT: Primary | ICD-10-CM

## 2019-04-11 DIAGNOSIS — F14.90 COCAINE USE: ICD-10-CM

## 2019-04-11 DIAGNOSIS — Z12.31 SCREENING MAMMOGRAM, ENCOUNTER FOR: ICD-10-CM

## 2019-04-11 DIAGNOSIS — F99 PSYCHIATRIC DIAGNOSIS: ICD-10-CM

## 2019-04-11 RX ORDER — ATORVASTATIN CALCIUM 20 MG/1
20 TABLET, FILM COATED ORAL
Qty: 90 TAB | Refills: 0 | Status: SHIPPED | OUTPATIENT
Start: 2019-04-11 | End: 2022-05-17 | Stop reason: SDUPTHER

## 2019-04-11 NOTE — PROGRESS NOTES
Pt here for Chief Complaint Patient presents with  Follow-up Recent Pacemaker placement 1. Have you been to the ER, urgent care clinic since your last visit? Hospitalized since your last visit? No 
 
2. Have you seen or consulted any other health care providers outside of the [address] [place] [address] since your last visit? Include any pap smears or colon screening. No  
 
 
Pt c/o pain 10 of 10, Pt took George ASA for pain today 3 most recent PHQ Screens 4/11/2019 PHQ Not Done Medical Reason (indicate in comments)

## 2019-04-11 NOTE — PATIENT INSTRUCTIONS
1. Please consider getting your breast cancer screening - order in 
 
2. Your blood pressure is good! 3. You have been given a prescription for your pneumonia and shingles vaccines which are done at the pharmacy 4. Colon cancer screening = kit will be mailed to your home. Please be aware that if this is positive you need further screening 5. Please schedule with the eye dr for glaucoma check

## 2019-04-11 NOTE — PROGRESS NOTES
Subjective: (As above and below) Chief Complaint Patient presents with  Follow-up Recent Pacemaker placement Tj Campbell is a 79y.o. year old female who presents for AWV and f/u on preventive care Depression/?schizophrenia, reports having regular treatment at [facility]. Reports taking meds as rx'd. States she is doing well. .. Cocaine use: unfortunately she continues to use, but reports \"only once a month\". Cigarettes states she smokes 1/2 a cigarette per day. No etoh Status post pacemaker: site healed well. She reports dissatisfaction, however, because she states that it \"shocks her once a night\". She has gone to the ED for this and device interrogation was normal... She continues to have these shock sensations. ... No sob or pain otherwise. When asked if she has f/u w/ cardiology she states \"If I go back there I might kill him for doing this to me\". She is laughing while saying this, when pt asked if she were being serious she proceeded to laugh and talk about how her microwave is up high so she does not think that is the problem. ... Pt informed that if she is making a threat against her doctor then the police will be notified. She states \"they all know me anyway\". She then states she knows the heart doctor did this to save her heart but still thinks they messed something up Hyperlipidemia: somehow statin dropped off her list, but she states she is taking a cholesterol medication. .. Reviewed PmHx, RxHx, FmHx, SocHx, AllgHx and updated in chart. History reviewed. No pertinent family history. Past Medical History:  
Diagnosis Date  Anxiety  CAD (coronary artery disease)  Heart failure ([facility]  Hypertension  Psychiatric disorder   
 depression  Stroke ([facility]  Stroke ([facility] [facility] 1970 Social History Socioeconomic History  Marital status:  Spouse name: Not on file  Number of children: Not on file  Years of education: Not on file  Highest education level: Not on file Tobacco Use  Smoking status: Current Every Day Smoker Types: Cigarettes  Smokeless tobacco: Never Used  Tobacco comment: 12/2019 Substance and Sexual Activity  Alcohol use: Not Currently Frequency: Never  Drug use: Yes Types: Marijuana, Cocaine Comment: new years kwadwo - last use  Sexual activity: Never Social History Narrative ** Merged History Encounter **  
    
  
 
 
Current Outpatient Medications Medication Sig  pneumococcal 13 rodriguez conj dip (PREVNAR-13) 0.5 mL syrg injection 0.5 mL by IntraMUSCular route once for 1 dose.  varicella-zoster recombinant, PF, (SHINGRIX, PF,) 50 mcg/0.5 mL susr injection 0.5 mL by IntraMUSCular route once for 1 dose.  atorvastatin (LIPITOR) 20 mg tablet Take 1 Tab by mouth nightly. For cholesterol  ARIPiprazole (ABILIFY) 10 mg tablet Take 10 mg by mouth daily.  dilTIAZem CD (CARDIZEM CD) 120 mg ER capsule Take 1 Cap by mouth daily.  aspirin 81 mg chewable tablet Take 1 Tab by mouth daily. No current facility-administered medications for this visit. Review of Systems:  
Constitutional:    Negative for fever and chills, negative diaphoresis. HEENT:              Negative for neck pain and stiffness. Eyes:                  Negative for visual disturbance, itching, redness or discharge. Respiratory:        Negative for cough and shortness of breath. +RAZO at baseline per patient Cardiovascular:  Negative for chest pain and palpitations. +shock pain to left chest wall once nightly. .. Gastrointestinal: Negative for nausea, vomiting, abdominal pain, diarrhea or constipation. Genitourinary:     Negative for dysuria and frequency. Musculoskeletal: Negative for falls, tenderness and swelling. She mentions a \"knot: that just popped up to left anterior shin yesterday, was painful but a little better Skin:                    Negative for rash, masses or lesions. Neurological:       Negative for dizzyness, seizure, loss of consciousness, weakness and numbness. Objective:  
 
Vitals:  
 04/11/19 1526 BP: 103/57 Pulse: 70 Resp: 18 Temp: 97.6 °F (36.4 °C) TempSrc: Oral  
Weight: 131 lb 1.6 oz (59.5 kg) Height: 5' 5\" (1.651 m) Results for orders placed or performed during the hospital encounter of 03/20/19 TROPONIN I Result Value Ref Range Troponin-I, Qt. <0.05 <0.05 ng/mL METABOLIC PANEL, COMPREHENSIVE Result Value Ref Range Sodium 140 136 - 145 mmol/L Potassium 3.9 3.5 - 5.1 mmol/L Chloride 108 97 - 108 mmol/L  
 CO2 24 21 - 32 mmol/L Anion gap 8 5 - 15 mmol/L Glucose 93 65 - 100 mg/dL BUN 17 6 - 20 MG/DL Creatinine 0.88 0.55 - 1.02 MG/DL  
 BUN/Creatinine ratio 19 12 - 20 GFR est AA >60 >60 ml/min/1.73m2 GFR est non-AA >60 >60 ml/min/1.73m2 Calcium 9.4 8.5 - 10.1 MG/DL Bilirubin, total 0.3 0.2 - 1.0 MG/DL  
 ALT (SGPT) 10 (L) 12 - 78 U/L  
 AST (SGOT) 11 (L) 15 - 37 U/L Alk. phosphatase 105 45 - 117 U/L Protein, total 8.0 6.4 - 8.2 g/dL Albumin 3.2 (L) 3.5 - 5.0 g/dL Globulin 4.8 (H) 2.0 - 4.0 g/dL A-G Ratio 0.7 (L) 1.1 - 2.2    
CBC WITH AUTOMATED DIFF Result Value Ref Range WBC 5.8 3.6 - 11.0 K/uL  
 RBC 5.19 3.80 - 5.20 M/uL  
 HGB 11.6 11.5 - 16.0 g/dL HCT 38.1 35.0 - 47.0 % MCV 73.4 (L) 80.0 - 99.0 FL  
 MCH 22.4 (L) 26.0 - 34.0 PG  
 MCHC 30.4 30.0 - 36.5 g/dL  
 RDW 14.7 (H) 11.5 - 14.5 % PLATELET 608 347 - 935 K/uL MPV 9.7 8.9 - 12.9 FL  
 NRBC 0.0 0  WBC ABSOLUTE NRBC 0.00 0.00 - 0.01 K/uL NEUTROPHILS 43 32 - 75 % LYMPHOCYTES 44 12 - 49 % MONOCYTES 10 5 - 13 % EOSINOPHILS 2 0 - 7 % BASOPHILS 1 0 - 1 % IMMATURE GRANULOCYTES 0 0.0 - 0.5 % ABS. NEUTROPHILS 2.5 1.8 - 8.0 K/UL  
 ABS. LYMPHOCYTES 2.6 0.8 - 3.5 K/UL  
 ABS. MONOCYTES 0.6 0.0 - 1.0 K/UL ABS. EOSINOPHILS 0.1 0.0 - 0.4 K/UL  
 ABS. BASOPHILS 0.0 0.0 - 0.1 K/UL  
 ABS. IMM. GRANS. 0.0 0.00 - 0.04 K/UL  
 DF AUTOMATED    
SAMPLES BEING HELD Result Value Ref Range SAMPLES BEING HELD 1RED 1BLUE   
 COMMENT Add-on orders for these samples will be processed based on acceptable specimen integrity and analyte stability, which may vary by analyte. CK W/ CKMB & INDEX Result Value Ref Range CK 42 26 - 192 U/L  
 CK - MB <1.0 <3.6 NG/ML  
 CK-MB Index Cannot be calculated 0.0 - 2.5 POC TROPONIN-I Result Value Ref Range Troponin-I (POC) <0.04 0.00 - 0.08 ng/mL EKG, 12 LEAD, INITIAL Result Value Ref Range Ventricular Rate 83 BPM  
 Atrial Rate 83 BPM  
 P-R Interval 138 ms QRS Duration 64 ms Q-T Interval 364 ms QTC Calculation (Bezet) 427 ms Calculated P Axis 30 degrees Calculated R Axis -20 degrees Calculated T Axis 31 degrees Diagnosis Normal sinus rhythm Early repolarization abnormality Leftward axis Nonspecific ST abnormality When compared with ECG of 06-JAN-2019 17:00, No significant change was found Confirmed by Alison Sharma M.D., [facility] ([id number]) on 3/20/2019 7:55:46 AM 
  
 
 
 
Gen: Oriented to person, place and time and well-developed, well-nourished and exhibiting some paranoid behaviors HEENT:   
Head: normocephalic and atraumatic. Eyes:  EOM are normal. Pupils equal and round. Neck:  Normal range of motion. Neck supple. Cardiovascular: normal rate, regular rhythm and normal heart sounds. Pulmonary/Chest:  Effort normal and breath sounds normal.  No respiratory distress. No wheezes, no rales. Abdominal: soft, normal  bowel sounds. Musculoskeletal:  No edema, no tenderness. No calf tenderness or edema. Right anterior shin: non tender bony prominence. .. Neurological:  Alert, oriented to person, place and time. Skin: skin is warm and dry. Pacer site healed Assessment/ Plan:  
 
Follow-up and Dispositions · Return in about 6 months (around 10/11/2019), or if symptoms worsen or fail to improve, for bp. Will notify cardiology of patient reports of shocking pains. .. While patient was laughing when making threats regarding her cardiologist, will still notify cardio and will call her psychiatrist tomorrow. She continues to use cocaine, risk of increased mood stability 1. Medicare annual wellness visit, subsequent 
 
- HIV 1/2 AG/AB, 4TH GENERATION,W RFLX CONFIRM 
- T PALLIDUM AB 
- HEPATITIS C AB 
- REFERRAL TO OPHTHALMOLOGY 2. Screening mammogram, encounter for States she will probably not get this because of pacemaker. .. Attempted to explain importance etc... - LOKESH MAMMO BI SCREENING INCL CAD; Future 3. Colon cancer screening 
 
- COLOGUARD TEST (FECAL DNA COLORECTAL CANCER SCREENING) 4. Screening for viral disease 
 
- HIV 1/2 AG/AB, 4TH GENERATION,W RFLX CONFIRM 
- T PALLIDUM AB 
- HEPATITIS C AB 
 
5. Cocaine use Pt counseled on cessation 6. Psychiatric diagnosis Good neighbor I have discussed the diagnosis with the patient and the intended plan as seen in the above orders. The patient has received an after-visit summary and questions were answered concerning future plans. Pt conveyed understanding of plan. Medication Side Effects and Warnings were discussed with patient: yes Patient Labs were reviewed: yes Patient Past Records were reviewed:  yes Raisa Nicolas NP This is the Subsequent Medicare Annual Wellness Exam, performed 12 months or more after the Initial AWV or the last Subsequent AWV I have reviewed the patient's medical history in detail and updated the computerized patient record. History Past Medical History:  
Diagnosis Date  Anxiety  CAD (coronary artery disease)  Heart failure ([facility] [address]  Hypertension  Psychiatric disorder   
 depression  Stroke ([facility] [address]  Stroke ([facility] [address] 1970 Past Surgical History: Procedure Laterality Date  CARDIAC SURG PROCEDURE UNLIST  HX GYN    
 tubal ligation  HX PACEMAKER PLACEMENT Left 2018  OK INS NEW/RPLCMT PRM PM W/TRANSV ELTRD ATRIAL&VENT  1/7/2019 Current Outpatient Medications Medication Sig Dispense Refill  pneumococcal 13 rodriguez conj dip (PREVNAR-13) 0.5 mL syrg injection 0.5 mL by IntraMUSCular route once for 1 dose. 0.5 mL 0  
 varicella-zoster recombinant, PF, (SHINGRIX, PF,) 50 mcg/0.5 mL susr injection 0.5 mL by IntraMUSCular route once for 1 dose. 0.5 mL 1  
 atorvastatin (LIPITOR) 20 mg tablet Take 1 Tab by mouth nightly. For cholesterol 90 Tab 0  ARIPiprazole (ABILIFY) 10 mg tablet Take 10 mg by mouth daily.  dilTIAZem CD (CARDIZEM CD) 120 mg ER capsule Take 1 Cap by mouth daily. 30 Cap 1  
 aspirin 81 mg chewable tablet Take 1 Tab by mouth daily. 30 Tab 0 No Known Allergies History reviewed. No pertinent family history. Social History Tobacco Use  Smoking status: Current Every Day Smoker Types: Cigarettes  Smokeless tobacco: Never Used  Tobacco comment: 12/2019 Substance Use Topics  Alcohol use: Not Currently Frequency: Never Patient Active Problem List  
Diagnosis Code  Cocaine abuse (HCC) F14.10  
 S/P placement of cardiac pacemaker Z95.0 Depression Risk Factor Screening:  
 
3 most recent PHQ Screens 4/11/2019 PHQ Not Done Medical Reason (indicate in comments) Alcohol Risk Factor Screening: You do not drink alcohol or very rarely. Functional Ability and Level of Safety:  
Hearing Loss Left hearing decreased, declines to see audiology Activities of Daily Living The home contains: no safety equipment. Patient needs help with:  transportation Fall Risk Fall Risk Assessment, last 12 mths 1/9/2019 Able to walk? Yes Fall in past 12 months? Yes Fall with injury? No  
Number of falls in past 12 months 1 Fall Risk Score 1 Abuse Screen Patient is not abused Cognitive Screening Evaluation of Cognitive Function: 
Has your family/caregiver stated any concerns about your memory: no 
Normal 
 
Patient Care Team  
Patient Care Team: 
None as PCP - General 
Other, MD Twila Cai MD (Cardiology) Assessment/Plan Education and counseling provided: 
Pneumococcal Vaccine Screening Mammography Colorectal cancer screening tests Bone mass measurement (DEXA) Screening for glaucoma Diagnoses and all orders for this visit: 
 
1. Medicare annual wellness visit, subsequent 
-     HIV 1/2 AG/AB, 4TH GENERATION,W RFLX CONFIRM 
-     T PALLIDUM AB 
-     HEPATITIS C AB 
-     REFERRAL TO OPHTHALMOLOGY 2. Screening mammogram, encounter for -     LOKESH MAMMO BI SCREENING INCL CAD; Future 3. Colon cancer screening 
-     COLOGUARD TEST (FECAL DNA COLORECTAL CANCER SCREENING) 4. Screening for viral disease 
-     HIV 1/2 AG/AB, 4TH GENERATION,W RFLX CONFIRM 
-     T PALLIDUM AB 
-     HEPATITIS C AB 
 
5. Cocaine use 6. Psychiatric diagnosis Other orders -     pneumococcal 13 rodriguez conj dip (PREVNAR-13) 0.5 mL syrg injection; 0.5 mL by IntraMUSCular route once for 1 dose. 
-     varicella-zoster recombinant, PF, (SHINGRIX, PF,) 50 mcg/0.5 mL susr injection; 0.5 mL by IntraMUSCular route once for 1 dose. -     atorvastatin (LIPITOR) 20 mg tablet; Take 1 Tab by mouth nightly. For cholesterol Health Maintenance Due Topic Date Due  
 Hepatitis C Screening  1950  DTaP/Tdap/Td series (1 - Tdap) 08/23/1973  Shingrix Vaccine Age 50> (1 of 2) 08/23/2002  BREAST CANCER SCRN MAMMOGRAM  08/23/2002  FOBT Q 1 YEAR AGE 50-75  08/23/2002  GLAUCOMA SCREENING Q2Y  08/23/2017  Pneumococcal 65+ years (1 of 2 - PCV13) 08/23/2017  MEDICARE YEARLY EXAM  12/28/2018

## 2019-04-12 ENCOUNTER — DOCUMENTATION ONLY (OUTPATIENT)
Dept: INTERNAL MEDICINE CLINIC | Age: 69
End: 2019-04-12

## 2019-04-14 ENCOUNTER — DOCUMENTATION ONLY (OUTPATIENT)
Dept: CARDIOLOGY CLINIC | Age: 69
End: 2019-04-14

## 2019-04-14 NOTE — PROGRESS NOTES
from 3-20-19 ER . .. \"Patient arrives today stating she had a nightmare last night and woke up about 2 hours ago with \"shocking\" chest pains. Patient states she \"was shocked\" with pain about 10 times, and has continued residual pain. Patient states she has been out of her \"heart pills\" for about one month, she has prescriptions ready at the pharmacy, she has a  that takes her to get them but just hasn't arranged it yet. \"       in ER, [facility]  Check of pacemaker: 194 \"fast A&V\" all a ST with PAC's averaging 158 bpm in A/V. She had 1 \" VT \" which was also a sinus tach with a burst of an atrial tach (1:1 at 222 bpm) for 5 seconds. She sent 10 remotes in the system, last on 3-20-19, all the same.      Future Appointments   Date Time Provider Tami Ashlee   4/30/2019 10:45 AM Gisela Lozoya [address] [facility]   4/30/2019 11:20 AM Alex Jerez  [address]   10/11/2019  1:00 PM Silvia Valencia.,  [address]

## 2019-04-16 ENCOUNTER — DOCUMENTATION ONLY (OUTPATIENT)
Dept: INTERNAL MEDICINE CLINIC | Age: 69
End: 2019-04-16

## 2019-04-30 ENCOUNTER — OFFICE VISIT (OUTPATIENT)
Dept: CARDIOLOGY CLINIC | Age: 69
End: 2019-04-30

## 2019-04-30 ENCOUNTER — CLINICAL SUPPORT (OUTPATIENT)
Dept: CARDIOLOGY CLINIC | Age: 69
End: 2019-04-30

## 2019-04-30 VITALS
DIASTOLIC BLOOD PRESSURE: 62 MMHG | RESPIRATION RATE: 14 BRPM | OXYGEN SATURATION: 99 % | SYSTOLIC BLOOD PRESSURE: 98 MMHG | HEIGHT: 65 IN | WEIGHT: 125 LBS | BODY MASS INDEX: 20.83 KG/M2 | HEART RATE: 84 BPM

## 2019-04-30 DIAGNOSIS — Z95.0 CARDIAC PACEMAKER IN SITU: Primary | ICD-10-CM

## 2019-04-30 DIAGNOSIS — F14.10 COCAINE ABUSE (HCC): ICD-10-CM

## 2019-04-30 DIAGNOSIS — I47.1 PAT (PAROXYSMAL ATRIAL TACHYCARDIA) (HCC): ICD-10-CM

## 2019-04-30 DIAGNOSIS — I47.29 NSVT (NONSUSTAINED VENTRICULAR TACHYCARDIA): ICD-10-CM

## 2019-04-30 NOTE — PROGRESS NOTES
Cardiac Electrophysiology OFFICE Note     Subjective:      Wil Harris is a 79 y.o. patient who is seen for follow up on Medtronic pacer  She had this implanted 1/7/2019 for intermittent third degree av block and PSVT   She had this 1/5/2019 at [facility] and is transferred to [facility] for pacemaker implant  She has had dizziness, RAZO and palpitation for about 6 months but did not want to tell anyone  She was admitted to [facility] with chest pain and drug screen + cocaine and THC, she said she uses it sometimes  Troponin was initially 0.07 but peaked at 0.3 and ECG showed NSR with septal q and ST elevation< 1 mm in V1-2  Echo 1/3/2019 with LVEF 70% severe LVH and mild MR  Telemetry 1/5 showed sinus rhythm with third degree av block and bradycardia to 15 bpm  She has short intermittent atrial flutter or atrial tachycardia   She had stent many years ago at [facility] but then went to [facility] for cardiac care  She reported 2 nuclear stress test with patent stents but said the chemical use made her feel so sick that she did not come back for follow up until she met  [facility]    Problem List  Date Reviewed: 4/30/2019          Codes Class Noted    S/P placement of cardiac pacemaker ICD-10-CM: Z95.0  ICD-9-CM: V45.01  1/10/2019        Cocaine abuse ([facility] ICD-10-CM: F14.10  ICD-9-CM: 305.60  1/6/2019              Current Outpatient Medications   Medication Sig Dispense Refill    atorvastatin (LIPITOR) 20 mg tablet Take 1 Tab by mouth nightly. For cholesterol 90 Tab 0    ARIPiprazole (ABILIFY) 10 mg tablet Take 10 mg by mouth daily.  dilTIAZem CD (CARDIZEM CD) 120 mg ER capsule Take 1 Cap by mouth daily. 30 Cap 1    aspirin 81 mg chewable tablet Take 1 Tab by mouth daily.  30 Tab 0     No Known Allergies  Past Medical History:   Diagnosis Date    Anxiety     CAD (coronary artery disease)     [facility] failure ([facility]     Hypertension     Psychiatric disorder     depression    [facility]     Stroke ([facility] [address] [address]     1970     Past Surgical History:   Procedure Laterality Date    CARDIAC SURG PROCEDURE UNLIST      HX GYN      tubal ligation    HX PACEMAKER PLACEMENT Left 2018    IL INS NEW/RPLCMT PRM PM W/TRANSV ELTRD ATRIAL&VENT  1/7/2019            Social History     Tobacco Use    Smoking status: Current Every Day Smoker     Types: Cigarettes    Smokeless tobacco: Never Used    Tobacco comment: 12/2019   Substance Use Topics    Alcohol use: Not Currently     Frequency: Never        Review of Systems:   Constitutional: Negative for fever, chills, weight loss, malaise/fatigue. HEENT: Negative for nosebleeds, vision changes. Respiratory: Negative for cough, hemoptysis  Cardiovascular: Negative for chest pain, palpitations, orthopnea, claudication, leg swelling, syncope, and PND. Gastrointestinal: Negative for nausea, vomiting, diarrhea, blood in stool and melena. Genitourinary: Negative for dysuria, and hematuria. Musculoskeletal: Negative for myalgias, arthralgia. Skin: Negative for rash. Heme: Does not bleed or bruise easily. Neurological: Negative for speech change and focal weakness     Objective:     Visit Vitals  BP 98/62 (BP 1 Location: Left arm, BP Patient Position: Sitting)   Pulse 84   Resp 14   Ht 5' 5\" (1.651 m)   Wt 125 lb (56.7 kg)   SpO2 99%   BMI 20.80 kg/m²      Physical Exam:   Constitutional: well-developed and well-nourished. No respiratory distress. Head: Normocephalic and atraumatic. Eyes: Pupils are equal, round  ENT: hearing normal  Neck: supple. No JVD present. Cardiovascular: Normal rate, regular rhythm. Exam reveals no gallop and no friction rub. No murmur heard. Pulmonary/Chest: Effort normal and breath sounds normal. No wheezes. Abdominal: Soft, no tenderness. Musculoskeletal: no edema. Neurological: alert,oriented. Skin: Skin is warm and dry, prominent left sided pacer site, no erosion or redness  Psychiatric: normal mood and affect.  Behavior is normal. Judgment and thought content normal.          Assessment/Plan:       ICD-10-CM ICD-9-CM    1. Cardiac pacemaker in situ Z95.0 V45.01    2. Cocaine abuse ([facility] F14.10 305.60    3. PAT (paroxysmal atrial tachycardia) ([facility]) I47.1 427.0    4. NSVT (nonsustained ventricular tachycardia) ([facility]) I47.2 427.1      reviewed diet, exercise and weight control  reviewed medications and side effects in detail   She has discomfort over the site of the pacer  She wants it removed  I explained to her whey she had it and she should stop taking cocaine and stay on cardizem. She has had stents and NSVT PAT on pacer check but with cocaine, beta 1 blocker is avoided  She looked at leadless pacer but now changes her mind and does not ask for dual chamber Medtronic pacer removal any more  She agrees to come back in person for check up and uses remote [place] 9 months  If she changes her mind she will call back  Future Appointments   Date Time Provider Tami Rosado   8/7/2019  1:15 PM REMOTE1, [address]   10/11/2019  1:00 PM Jg Miller NP [facility] [facility]   12/9/2019  9:00 AM REMOTE1, [address]   3/4/2020  8:30 AM REMOTE1, [address]   5/5/2020 10:15 AM PACEMAKER3, [address]   5/5/2020 10:40 AM Twila Stern  [address]       Thank you for involving me in this patient's care and please call with further concerns or questions. Valeria Millan M.D.   Electrophysiology/Cardiology  [facility] [place]  [address] [place] [address], [address]  [place], [facility] [address]                             [address] [place] [address]  [phone]

## 2019-07-04 ENCOUNTER — APPOINTMENT (OUTPATIENT)
Dept: CT IMAGING | Age: 69
End: 2019-07-04
Attending: EMERGENCY MEDICINE
Payer: MEDICARE

## 2019-07-04 ENCOUNTER — HOSPITAL ENCOUNTER (EMERGENCY)
Age: 69
Discharge: HOME | End: 2019-07-04
Attending: EMERGENCY MEDICINE
Payer: MEDICARE

## 2019-07-04 VITALS
DIASTOLIC BLOOD PRESSURE: 63 MMHG | OXYGEN SATURATION: 98 % | BODY MASS INDEX: 21.99 KG/M2 | HEIGHT: 65 IN | RESPIRATION RATE: 18 BRPM | SYSTOLIC BLOOD PRESSURE: 110 MMHG | WEIGHT: 132 LBS | TEMPERATURE: 98.1 F | HEART RATE: 73 BPM

## 2019-07-04 DIAGNOSIS — F19.90 DRUG USE: ICD-10-CM

## 2019-07-04 DIAGNOSIS — R55 NEAR SYNCOPE: Primary | ICD-10-CM

## 2019-07-04 LAB
ALBUMIN SERPL-MCNC: 2.6 G/DL (ref 3.5–5)
ALBUMIN/GLOB SERPL: 0.7 {RATIO} (ref 1.1–2.2)
ALP SERPL-CCNC: 88 U/L (ref 45–117)
ALT SERPL-CCNC: 9 U/L (ref 12–78)
AMPHET UR QL SCN: NEGATIVE
ANION GAP SERPL CALC-SCNC: 4 MMOL/L (ref 5–15)
APPEARANCE UR: CLEAR
APTT PPP: 23.1 SEC (ref 22.1–32)
AST SERPL-CCNC: 19 U/L (ref 15–37)
ATRIAL RATE: 71 BPM
BACTERIA URNS QL MICRO: NEGATIVE /HPF
BARBITURATES UR QL SCN: NEGATIVE
BASOPHILS # BLD: 0 K/UL (ref 0–0.1)
BASOPHILS NFR BLD: 1 % (ref 0–1)
BENZODIAZ UR QL: NEGATIVE
BILIRUB SERPL-MCNC: 0.4 MG/DL (ref 0.2–1)
BILIRUB UR QL: NEGATIVE
BUN SERPL-MCNC: 13 MG/DL (ref 6–20)
BUN/CREAT SERPL: 16 (ref 12–20)
CALCIUM SERPL-MCNC: 8.2 MG/DL (ref 8.5–10.1)
CALCULATED P AXIS, ECG09: 64 DEGREES
CALCULATED R AXIS, ECG10: -16 DEGREES
CALCULATED T AXIS, ECG11: 34 DEGREES
CANNABINOIDS UR QL SCN: POSITIVE
CHLORIDE SERPL-SCNC: 113 MMOL/L (ref 97–108)
CO2 SERPL-SCNC: 23 MMOL/L (ref 21–32)
COCAINE UR QL SCN: POSITIVE
COLOR UR: ABNORMAL
CREAT SERPL-MCNC: 0.79 MG/DL (ref 0.55–1.02)
D DIMER PPP FEU-MCNC: 2.16 MG/L FEU (ref 0–0.65)
DIAGNOSIS, 93000: NORMAL
DIFFERENTIAL METHOD BLD: ABNORMAL
DRUG SCRN COMMENT,DRGCM: ABNORMAL
EOSINOPHIL # BLD: 0.1 K/UL (ref 0–0.4)
EOSINOPHIL NFR BLD: 4 % (ref 0–7)
EPITH CASTS URNS QL MICRO: ABNORMAL /LPF
ERYTHROCYTE [DISTWIDTH] IN BLOOD BY AUTOMATED COUNT: 16.4 % (ref 11.5–14.5)
GLOBULIN SER CALC-MCNC: 4 G/DL (ref 2–4)
GLUCOSE SERPL-MCNC: 77 MG/DL (ref 65–100)
GLUCOSE UR STRIP.AUTO-MCNC: NEGATIVE MG/DL
HCT VFR BLD AUTO: 31 % (ref 35–47)
HGB BLD-MCNC: 9.4 G/DL (ref 11.5–16)
HGB UR QL STRIP: NEGATIVE
HYALINE CASTS URNS QL MICRO: ABNORMAL /LPF (ref 0–5)
IMM GRANULOCYTES # BLD AUTO: 0 K/UL
IMM GRANULOCYTES NFR BLD AUTO: 0 %
INR PPP: 1.1 (ref 0.9–1.1)
KETONES UR QL STRIP.AUTO: NEGATIVE MG/DL
LEUKOCYTE ESTERASE UR QL STRIP.AUTO: ABNORMAL
LYMPHOCYTES # BLD: 2.1 K/UL (ref 0.8–3.5)
LYMPHOCYTES NFR BLD: 54 % (ref 12–49)
MAGNESIUM SERPL-MCNC: 2.1 MG/DL (ref 1.6–2.4)
MCH RBC QN AUTO: 22.3 PG (ref 26–34)
MCHC RBC AUTO-ENTMCNC: 30.3 G/DL (ref 30–36.5)
MCV RBC AUTO: 73.6 FL (ref 80–99)
METHADONE UR QL: NEGATIVE
MONOCYTES # BLD: 0.3 K/UL (ref 0–1)
MONOCYTES NFR BLD: 8 % (ref 5–13)
MUCOUS THREADS URNS QL MICRO: ABNORMAL /LPF
NEUTS SEG # BLD: 1.2 K/UL (ref 1.8–8)
NEUTS SEG NFR BLD: 33 % (ref 32–75)
NITRITE UR QL STRIP.AUTO: NEGATIVE
NRBC # BLD: 0 K/UL (ref 0–0.01)
NRBC BLD-RTO: 0 PER 100 WBC
OPIATES UR QL: NEGATIVE
P-R INTERVAL, ECG05: 156 MS
PCP UR QL: NEGATIVE
PH UR STRIP: 5.5 [PH] (ref 5–8)
PLATELET # BLD AUTO: 186 K/UL (ref 150–400)
PLATELET COMMENTS,PCOM: ABNORMAL
PMV BLD AUTO: 10.5 FL (ref 8.9–12.9)
POTASSIUM SERPL-SCNC: 4.2 MMOL/L (ref 3.5–5.1)
PROT SERPL-MCNC: 6.6 G/DL (ref 6.4–8.2)
PROT UR STRIP-MCNC: ABNORMAL MG/DL
PROTHROMBIN TIME: 11.1 SEC (ref 9–11.1)
Q-T INTERVAL, ECG07: 382 MS
QRS DURATION, ECG06: 58 MS
QTC CALCULATION (BEZET), ECG08: 415 MS
RBC # BLD AUTO: 4.21 M/UL (ref 3.8–5.2)
RBC #/AREA URNS HPF: ABNORMAL /HPF (ref 0–5)
RBC MORPH BLD: ABNORMAL
SODIUM SERPL-SCNC: 140 MMOL/L (ref 136–145)
SP GR UR REFRACTOMETRY: 1.03 (ref 1–1.03)
THERAPEUTIC RANGE,PTTT: NORMAL SECS (ref 58–77)
TROPONIN I SERPL-MCNC: <0.05 NG/ML
TROPONIN I SERPL-MCNC: <0.05 NG/ML
UR CULT HOLD, URHOLD: NORMAL
UROBILINOGEN UR QL STRIP.AUTO: 1 EU/DL (ref 0.2–1)
VENTRICULAR RATE, ECG03: 71 BPM
WBC # BLD AUTO: 3.7 K/UL (ref 3.6–11)
WBC URNS QL MICRO: ABNORMAL /HPF (ref 0–4)

## 2019-07-04 PROCEDURE — 80307 DRUG TEST PRSMV CHEM ANLYZR: CPT

## 2019-07-04 PROCEDURE — 83735 ASSAY OF MAGNESIUM: CPT

## 2019-07-04 PROCEDURE — 70450 CT HEAD/BRAIN W/O DYE: CPT

## 2019-07-04 PROCEDURE — 85379 FIBRIN DEGRADATION QUANT: CPT

## 2019-07-04 PROCEDURE — 80053 COMPREHEN METABOLIC PANEL: CPT

## 2019-07-04 PROCEDURE — 36415 COLL VENOUS BLD VENIPUNCTURE: CPT

## 2019-07-04 PROCEDURE — 74011636320 HC RX REV CODE- 636/320: Performed by: RADIOLOGY

## 2019-07-04 PROCEDURE — 81001 URINALYSIS AUTO W/SCOPE: CPT

## 2019-07-04 PROCEDURE — 85730 THROMBOPLASTIN TIME PARTIAL: CPT

## 2019-07-04 PROCEDURE — 99285 EMERGENCY DEPT VISIT HI MDM: CPT

## 2019-07-04 PROCEDURE — 84484 ASSAY OF TROPONIN QUANT: CPT

## 2019-07-04 PROCEDURE — 93005 ELECTROCARDIOGRAM TRACING: CPT

## 2019-07-04 PROCEDURE — 85025 COMPLETE CBC W/AUTO DIFF WBC: CPT

## 2019-07-04 PROCEDURE — 74011000258 HC RX REV CODE- 258: Performed by: RADIOLOGY

## 2019-07-04 PROCEDURE — 71275 CT ANGIOGRAPHY CHEST: CPT

## 2019-07-04 PROCEDURE — 85610 PROTHROMBIN TIME: CPT

## 2019-07-04 RX ORDER — SODIUM CHLORIDE 0.9 % (FLUSH) 0.9 %
10 SYRINGE (ML) INJECTION
Status: COMPLETED | OUTPATIENT
Start: 2019-07-04 | End: 2019-07-04

## 2019-07-04 RX ADMIN — Medication 10 ML: at 12:57

## 2019-07-04 RX ADMIN — IOPAMIDOL 100 ML: 755 INJECTION, SOLUTION INTRAVENOUS at 12:57

## 2019-07-04 RX ADMIN — SODIUM CHLORIDE 100 ML: 900 INJECTION, SOLUTION INTRAVENOUS at 12:57

## 2019-07-04 NOTE — ED NOTES
Patient has received discharge instructions from ER MD, verbalizes understanding. Ambulatory upon discharge to waiting room, medicaid cab [facility] [place], ref # [id number].

## 2019-07-04 NOTE — ED PROVIDER NOTES
Kiana Hurst is a 79 y.o. female who presents via EMS to the ED with a c/o chest pain, syncope today. Pt reports that her pain was \"sharp\" and under her left breast but states that this pain is resolved at this time. Pt reports that she was sitting on the bench earlier this morning and felt a \"pinch\" of chest pain. She states that the longer she sat, the chest pain worsened, and that the heat got to her and she \"fell out\". Pt also states that someone saw her clutch her chest and called EMS. EMS report that on arrival, pt was lethergic but state that over time pt's mental status returned to normal and she as able to answer questions appropriately. Pt denies any chest pain, cough or shortness of breath at this time. EMS report that pt's BP was 96/36 and she was given 900 cc of NSF and 325 ASA. Her BP increased to 120/78 and her Blood sugar was 110. Pt specifically denies chest pain, shortness of breath, n/v,d , fever, chills, numbness, tingling, abdominal pain, back pain, cough, leg swelling, dizziness or any other acute sx. Pt denies any recent travel, known sick contacts, or recent illness. PCP: Mago Tripathi., NP  PMHx significant for: CAD, CHF, Depression, Anxiety, HTN, TIA  PSHx significant for: Tubal Ligation, Pacemaker,   Social Hx: Tobacco: Current Every Day smoker EtOH: none Illicit drug use: Cocaine, Marijuana (hx)    There are no further complaints or symptoms at this time. Signed by: Sandy Danielle, scribing for Corbin Layne Copcononr on July 4th, 2019 at 10:13 AM.           Past Medical History:   Diagnosis Date    Anxiety     CAD (coronary artery disease)     Heart failure ([address]     Hypertension     Psychiatric disorder     depression    Stroke ([address]     Stroke ([address]     1970       Past Surgical History:   Procedure Laterality Date    CARDIAC SURG PROCEDURE UNLIST      HX GYN      tubal ligation    HX PACEMAKER PLACEMENT Left 2018    TX INS NEW/RPLCMT PRM PM W/TRANSV ELTRD ATRIAL&VENT  1/7/2019              History reviewed. No pertinent family history. Social History     Socioeconomic History    Marital status:      Spouse name: Not on file    Number of children: Not on file    Years of education: Not on file    Highest education level: Not on file   Occupational History    Not on file   Social Needs    Financial resource strain: Not on file    Food insecurity:     Worry: Not on file     Inability: Not on file    Transportation needs:     Medical: Not on file     Non-medical: Not on file   Tobacco Use    Smoking status: Current Every Day Smoker     Types: Cigarettes    Smokeless tobacco: Never Used    Tobacco comment: 12/2019   Substance and Sexual Activity    Alcohol use: Not Currently     Frequency: Never    Drug use: Yes     Types: Marijuana, Cocaine     Comment: new years kwadwo - last use    Sexual activity: Never   Lifestyle    Physical activity:     Days per week: Not on file     Minutes per session: Not on file    Stress: Not on file   Relationships    Social connections:     Talks on phone: Not on file     Gets together: Not on file     Attends Je[employer]'s witness service: Not on file     Active member of club or organization: Not on file     Attends meetings of clubs or organizations: Not on file     Relationship status: Not on file    Intimate partner violence:     Fear of current or ex partner: Not on file     Emotionally abused: Not on file     Physically abused: Not on file     Forced sexual activity: Not on file   Other Topics Concern    Not on file   Social History Narrative    ** Merged History Encounter **              ALLERGIES: Patient has no known allergies. Review of Systems   Constitutional: Negative for chills and fever. Respiratory: Negative for cough and shortness of breath. Cardiovascular: Negative for chest pain and leg swelling. Gastrointestinal: Negative for abdominal pain, diarrhea, nausea and vomiting. Musculoskeletal: Negative for back pain and neck pain. Neurological: Positive for syncope. All other systems reviewed and are negative. Vitals:    07/04/19 1021   BP: 101/57   Pulse: 75   Resp: 22   Temp: 98.1 °F (36.7 °C)   SpO2: 100%   Weight: 59.9 kg (132 lb)   Height: 5' 5\" (1.651 m)            Physical Exam   Constitutional: She is oriented to person, place, and time. She appears well-developed and well-nourished. No distress. HENT:   Head: Normocephalic and atraumatic. Eyes: Pupils are equal, round, and reactive to light. Neck: Normal range of motion. Neck supple. Cardiovascular: Normal rate, regular rhythm and normal heart sounds. Exam reveals no gallop and no friction rub. No murmur heard. Pulmonary/Chest: Effort normal and breath sounds normal. No respiratory distress. She has no wheezes. Abdominal: Soft. Bowel sounds are normal. She exhibits no distension. There is no tenderness. There is no rebound and no guarding. Musculoskeletal: Normal range of motion. Neurological: She is alert and oriented to person, place, and time. Skin: Skin is warm. No rash noted. She is not diaphoretic. Psychiatric: She has a normal mood and affect. Her behavior is normal. Judgment and thought content normal.   Nursing note and vitals reviewed. MDM     This is a 62-year-old female with past medical history, review of systems, physical exam as above, presenting with complaints of chest pain, and syncope, both resolved. Per patient, she got up this morning early to sit outside, began to experience sharp chest pain underneath the left breast, prior to weakness, and syncope. Per EMS, arrived on scene to find the patient responsive, however lethargic, with mental status improving over time. Upon arrival she is awake, alert, without complaints, denying chest pain or shortness of breath.  Physical exam is remarkable for well-appearing elderly female, in no acute distress, with no palpable chest pain, clear breath sounds, soft abdomen, regular rate and rhythm without murmurs gallops or rubs. Chart review indicates a history of substance abuse, patient denies recent drug use. Differential includes acute coronary syndrome, hypotension, electrolyte abnormality. EMS description of events he is not particularly consistent with seizure disorder, or postictal state. As patient is pain-free, we'll defer pain medications, obtain CMP, CBC, EKG, head CT, cardiac enzymes, d-dimer. We will reevaluate and make a disposition. Procedures    ED EKG interpretation:  Rhythm: paced; and regular . Rate (approx.): 70; Axis: normal; ST/T wave: ST Elevation isolated to V3, Similar to EKG on 3/20; Time: 10:32 AM; As dictated by Santiago Maharaj MD.    2:33 PM  Unchanged imaging, EKG, lab work. CP and near syncope likely 2/2 drug use. Will Dc home with instructions to curb illicit drug use.

## 2019-07-04 NOTE — DISCHARGE INSTRUCTIONS
Patient Education        Fainting: Care Instructions  Your Care Instructions    When you faint, or pass out, you lose consciousness for a short time. A brief drop in blood flow to the brain often causes it. When you fall or lie down, more blood flows to your brain and you regain consciousness. Emotional stress, pain, or overheating--especially if you have been standing--can make you faint. In these cases, fainting is usually not serious. But fainting can be a sign of a more serious problem. Your doctor may want you to have more tests to rule out other causes. The treatment you need depends on the reason why you fainted. The doctor has checked you carefully, but problems can develop later. If you notice any problems or new symptoms, get medical treatment right away. Follow-up care is a key part of your treatment and safety. Be sure to make and go to all appointments, and call your doctor if you are having problems. It's also a good idea to know your test results and keep a list of the medicines you take. How can you care for yourself at home? · Drink plenty of fluids to prevent dehydration. If you have kidney, heart, or liver disease and have to limit fluids, talk with your doctor before you increase your fluid intake. When should you call for help? Call 911 anytime you think you may need emergency care. For example, call if:    · You have symptoms of a heart problem. These may include:  ? Chest pain or pressure. ? Severe trouble breathing. ? A fast or irregular heartbeat. ? Lightheadedness or sudden weakness. ? Coughing up pink, foamy mucus. ? Passing out. After you call 911, the  may tell you to chew 1 adult-strength or 2 to 4 low-dose aspirin. Wait for an ambulance. Do not try to drive yourself.     · You have symptoms of a stroke. These may include:  ? Sudden numbness, tingling, weakness, or loss of movement in your face, arm, or leg, especially on only one side of your body.   ? Sudden vision changes. ? Sudden trouble speaking. ? Sudden confusion or trouble understanding simple statements. ? Sudden problems with walking or balance. ? A sudden, severe headache that is different from past headaches.     · You passed out (lost consciousness) again.    Watch closely for changes in your health, and be sure to contact your doctor if:    · You do not get better as expected. Where can you learn more? Go to http://court-sarah.info/. Enter X028 in the search box to learn more about \"Fainting: Care Instructions. \"  Current as of: September 23, 2018  Content Version: 11.9  © 4311-9708 [employer]. Care instructions adapted under license by [employer] (which disclaims liability or warranty for this information). If you have questions about a medical condition or this instruction, always ask your healthcare professional. [address] any warranty or liability for your use of this information. Patient Education        Learning About Drug Misuse  What is drug misuse? Drug misuse means using drugs in a way that harms you or causes you to harm others. Your doctor may call it substance use disorder or drug abuse. It's possible to misuse almost any type of drug, including illegal drugs, prescription drugs, and over-the-counter drugs. An overdose happens when a person takes more than the normal or recommended amount of a drug. An overdose can result in harmful symptoms or death. Could you have a problem? If there's a chance you may be misusing drugs, it's important to find out. So ask yourself a few questions. · Do you spend a lot of time thinking about your medicine or other drug--getting it and using it? Has that taken the place of other things you used to enjoy? · Have you had problems with your family or friends, or at work, because of your use? · Do you use drugs even when you've told yourself you won't?   Do you think you might have a problem? If you do, then you've just taken an important first step. Many people have overcome this problem. And most of them started by reaching out to others, like caring friends or family, their doctor, or a support group. How is drug misuse treated? If you think you may have a problem with drugs, talk to your doctor. You and your doctor can decide whether you have a problem and what type of treatment might help you. You may need to stay in a hospital at first, so that you can be treated for withdrawal symptoms. One of the goals of treatment is helping you get used to life without the drug. Counseling can help you prepare for people or situations that might tempt you to start using again. You can practice these skills through one-on-one counseling, family therapy, or group therapy. Therapy may be part of inpatient treatment, where you stay in a treatment center. Or it may be part of outpatient treatment, where you can fit your therapy around your job or other responsibilities. Another goal of treatment is getting ongoing support for your drug-free life. Many people find support by going to meetings like Narcotics Anonymous or SMART Recovery. This type of support can help you feel less alone and more motivated to stay drug-free. You might talk to your doctor or do an online search for local treatment programs. Or you might tell a friend or loved one that you need help. Follow-up care is a key part of your treatment and safety. Be sure to make and go to all appointments, and call your doctor if you are having problems. It's also a good idea to know your test results and keep a list of the medicines you take. Where can you learn more? Go to http://court-sarah.info/. Enter [phone] in the search box to learn more about \"Learning About Drug Misuse. \"  Current as of: May 7, 2018  Content Version: 11.9  © 6096-4270 [employer].  Care instructions adapted under license by [employer] (which disclaims liability or warranty for this information). If you have questions about a medical condition or this instruction, always ask your healthcare professional. [address] any warranty or liability for your use of this information.

## 2019-07-04 NOTE — ED TRIAGE NOTES
Pt was sitting outside with other people around, apparently she clutched and fell over on to a bench. The pt states that \"the heat got to her\" Her BP was 96/36 and received 900 cc NS with BP up to 120/78 with .  Pt was given 325 ASA by EMS

## 2019-08-07 ENCOUNTER — OFFICE VISIT (OUTPATIENT)
Dept: CARDIOLOGY CLINIC | Age: 69
End: 2019-08-07

## 2019-08-07 DIAGNOSIS — Z95.0 CARDIAC PACEMAKER IN SITU: Primary | ICD-10-CM

## 2019-10-11 ENCOUNTER — HOSPITAL ENCOUNTER (OUTPATIENT)
Dept: GENERAL RADIOLOGY | Age: 69
Discharge: HOME | End: 2019-10-11
Payer: MEDICARE

## 2019-10-11 ENCOUNTER — OFFICE VISIT (OUTPATIENT)
Dept: INTERNAL MEDICINE CLINIC | Age: 69
End: 2019-10-11

## 2019-10-11 VITALS
TEMPERATURE: 96.4 F | OXYGEN SATURATION: 98 % | SYSTOLIC BLOOD PRESSURE: 115 MMHG | RESPIRATION RATE: 18 BRPM | DIASTOLIC BLOOD PRESSURE: 67 MMHG | HEART RATE: 98 BPM | WEIGHT: 129.1 LBS | BODY MASS INDEX: 21.51 KG/M2 | HEIGHT: 65 IN

## 2019-10-11 DIAGNOSIS — M25.551 PAIN OF RIGHT HIP JOINT: ICD-10-CM

## 2019-10-11 DIAGNOSIS — L81.9 PIGMENTED SKIN LESION SUSPICIOUS FOR MALIGNANT NEOPLASM: ICD-10-CM

## 2019-10-11 DIAGNOSIS — R30.0 DYSURIA: Primary | ICD-10-CM

## 2019-10-11 LAB
BILIRUB UR QL STRIP: NEGATIVE
GLUCOSE UR-MCNC: NEGATIVE MG/DL
KETONES P FAST UR STRIP-MCNC: NEGATIVE MG/DL
PH UR STRIP: 6 [PH] (ref 4.6–8)
PROT UR QL STRIP: NEGATIVE
SP GR UR STRIP: 1.02 (ref 1–1.03)
UA UROBILINOGEN AMB POC: NORMAL (ref 0.2–1)
URINALYSIS CLARITY POC: CLEAR
URINALYSIS COLOR POC: NORMAL
URINE BLOOD POC: NEGATIVE
URINE LEUKOCYTES POC: NORMAL
URINE NITRITES POC: NEGATIVE

## 2019-10-11 PROCEDURE — 73502 X-RAY EXAM HIP UNI 2-3 VIEWS: CPT

## 2019-10-11 PROCEDURE — 72100 X-RAY EXAM L-S SPINE 2/3 VWS: CPT

## 2019-10-11 RX ORDER — MIRTAZAPINE 15 MG/1
TABLET, FILM COATED ORAL
Refills: 2 | COMMUNITY
Start: 2019-09-20

## 2019-10-11 NOTE — PROGRESS NOTES
Subjective: (As above and below)     Chief Complaint   Patient presents with    Annual Exam    Eye Problem     left eye laceration    Leg Pain     right     Nhi Arechiga is a 81y.o. year old female who presents for:    She is a poor historian    Eye problem: scaly patch above L eyebrow- she is unable to tell me for how long but \"a while\". She states that it is pruritic and growing. No injury to the area    R hip pain and low back pain: chronic. Denies saddle numbness, leg weakness, falls or bowel/bladder dysfunction. She also points to R flank, reports dysuria \"sometimes'. No other UTI s/s  Is not taking anything otc. No pain now      S/p cardiac pacemaker; followed by cardio, denies RAZO, chest pain            Reviewed PmHx, RxHx, FmHx, SocHx, AllgHx and updated in chart. History reviewed. No pertinent family history. Past Medical History:   Diagnosis Date    Anxiety     CAD (coronary artery disease)     [facility]     Hypertension     Psychiatric disorder     depression    Stroke ([facility]     Stroke ([facility]     1970      Social History     Socioeconomic History    Marital status:      Spouse name: Not on file    Number of children: Not on file    Years of education: Not on file    Highest education level: Not on file   Tobacco Use    Smoking status: Current Every Day Smoker     Types: Cigarettes    Smokeless tobacco: Never Used    Tobacco comment: 12/2019   Substance and Sexual Activity    Alcohol use: Not Currently     Frequency: Never    Drug use: Yes     Types: Marijuana, Cocaine     Comment: new years kwadwo - last use    Sexual activity: Never   Social History Narrative    ** Merged History Encounter **               Current Outpatient Medications   Medication Sig    mirtazapine (REMERON) 15 mg tablet TAKE 1/2 TABLET BY MOUTH EVERY NIGHT FOR INSOMNIA    atorvastatin (LIPITOR) 20 mg tablet Take 1 Tab by mouth nightly.  For cholesterol    ARIPiprazole (ABILIFY) 10 mg tablet Take 10 mg by mouth daily.  dilTIAZem CD (CARDIZEM CD) 120 mg ER capsule Take 1 Cap by mouth daily.  aspirin 81 mg chewable tablet Take 1 Tab by mouth daily. No current facility-administered medications for this visit. Review of Systems:   Constitutional:    Negative for fever and chills, negative diaphoresis. HEENT:              Negative for neck pain and stiffness. Eyes:                  Negative for visual disturbance, itching, redness or discharge. Respiratory:        Negative for cough and shortness of breath. Cardiovascular:  Negative for chest pain and palpitations. Gastrointestinal: Negative for nausea, vomiting, abdominal pain, diarrhea or constipation. Genitourinary:     Negative for frequency. Musculoskeletal: Negative for falls, tenderness and swelling. Skin:                    Negative for rash, masses or lesions. Neurological:       Negative for dizzyness, seizure, loss of consciousness, weakness and numbness.      Objective:     Vitals:    10/11/19 1318   BP: 115/67   Pulse: 98   Resp: 18   Temp: 96.4 °F (35.8 °C)   TempSrc: Oral   SpO2: 98%   Weight: 129 lb 1.6 oz (58.6 kg)   Height: 5' 5\" (1.651 m)       Results for orders placed or performed in visit on 10/11/19   AMB POC URINALYSIS DIP STICK AUTO W/O MICRO   Result Value Ref Range    Color (UA POC) Dark Yellow     Clarity (UA POC) Clear     Glucose (UA POC) Negative Negative    Bilirubin (UA POC) Negative Negative    Ketones (UA POC) Negative Negative    Specific gravity (UA POC) 1.025 1.001 - 1.035    Blood (UA POC) Negative Negative    pH (UA POC) 6.0 4.6 - 8.0    Protein (UA POC) Negative Negative    Urobilinogen (UA POC) 0.2 mg/dL 0.2 - 1    Nitrites (UA POC) Negative Negative    Leukocyte esterase (UA POC) 1+ Negative         Physical Examination: General appearance - alert, well appearing, and in no distress  Chest - clear to auscultation, no wheezes, rales or rhonchi, symmetric air entry  Heart - normal rate, regular rhythm, normal S1, S2, no murmurs, rubs, clicks or gallops  Back exam -pain to R leg w. SLR, lower ext strength 5/5 to flex/ext, gait normal  Musculoskeletal - no joint tenderness, deformity or swelling  Skin - grey scaly patch near L brow bone. It is approx 1.5cm around, irregular borders, slightly raised      Assessment/ Plan:       1. Dysuria    - AMB POC URINALYSIS DIP STICK AUTO W/O MICRO    2. Pain of right hip joint    - XR SPINE LUMB 2 OR 3 V; Future  - XR HIP RT W OR WO PELV 2-3 VWS; Future  - REFERRAL TO PHYSICAL THERAPY    3. Pigmented skin lesion suspicious for malignant neoplasm    - REFERRAL TO DERMATOLOGY      I have discussed the diagnosis with the patient and the intended plan as seen in the above orders. The patient has received an after-visit summary and questions were answered concerning future plans. Pt conveyed understanding of plan. Medication Side Effects and Warnings were discussed with patient: yes  Patient Labs were reviewed: yes  Patient Past Records were reviewed:  yes    Anabela Fuller.  Nasim Fried NP

## 2019-10-11 NOTE — PROGRESS NOTES
Pt here for   Chief Complaint   Patient presents with    Annual Exam    Eye Problem     left eye laceration    Leg Pain     right     1. Have you been to the ER, urgent care clinic since your last visit? Hospitalized since your last visit? No    2. Have you seen or consulted any other health care providers outside of the [address] [place] [address] since your last visit? Include any pap smears or colon screening.  No       Pt denies pain at this time    3 most recent PHQ Screens 10/11/2019   PHQ Not Done Medical Reason (indicate in comments)

## 2020-03-11 ENCOUNTER — OFFICE VISIT (OUTPATIENT)
Dept: CARDIOLOGY CLINIC | Age: 70
End: 2020-03-11

## 2020-03-11 DIAGNOSIS — Z95.0 CARDIAC PACEMAKER IN SITU: Primary | ICD-10-CM

## 2020-04-22 ENCOUNTER — TELEPHONE (OUTPATIENT)
Dept: CARDIOLOGY CLINIC | Age: 70
End: 2020-04-22

## 2020-04-22 NOTE — TELEPHONE ENCOUNTER
Left a voice message on 4/22/2020 requesting a return call to convert 5/5/2020 appt with Dr. Romy Conte to a VV or Phone Call or discuss other options.  Please pass call to \[facility]\""

## 2020-05-04 NOTE — PROGRESS NOTES
Cardiac Electrophysiology VIRTUAL VISIT Note   Pursuant to the emergency declaration under the [facility] waiver authority and the [employer] Act, this Virtual  Visit was conducted, with patient's consent, to reduce the patient's risk of exposure to COVID-19 and provide continuity of care for an established patient. Services were provided through a video synchronous discussion virtually to substitute for in-person clinic visit. She refused to use video  Her  gave me her new cell # [phone]    Subjective:      Shelly Adams is a 81 y.o. patient who is seen virtually via audio phone call for follow up of Medtronic dual chamber pacemaker (DOI 01/07/2019). pacemaker check in March 2020 showed appropriate atrial and ventricular sensing and atrial capture threshold. No ventricular capture threshold was obtained since this feature is not available during the remote checks. Since 8-7-19 the device detected 690 \"fast A&V\", and 31 episodes of \"VT\". Presenting EGMs are a sinus tachycardia/atrial tach (1:1 conduction). There was also 1 Afib/flutter, 39 seconds in duration on 8-25-19. Prior check in 03/2020 showed generator longevity 8 yrs, 4 mos. RA 4.61%, RV 3.09% at that time. She had used cocaine and THC  She said her apt complex stresses her out  Her  gave me her cell # [phone] so I called her    No OAC, on ASA 81 mg po daily. Denies bleeding issues. She said she has relocated her pacemaker remote box to the kitchen  She has a knot in the groin and abdominal pain so she is worried about aneurysm or hernia  In the past CT scan did not mention aneurysm but she had stroke: : There are scattered areas of low density in the periventricular and subcortical white matter most likely related to small vessels can be changes.   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 Onset while seated, gradually worsened, heat compounded issue. Witness saw her clutch her chest, LOC. EMS reported BP 96/36, responded well to NS. Tested + for cocaine & THC. Troponin negative. Medtronic dual chamber pacemaker 01/07/2019 for intermittent 3rd degree AVB & PSVT. Ventricular septum & RV thickness markedly increased. Increased thickness of atrial septum c/w lipomatous hypertrophy. Mild MR. Echo (01/03/2019): LVEF 85%, small LV cavity, severe concentric LVH, grade 3 diastolic dysfunction. Admitted 01/2019 with chest pain. Drug screen + cocaine & THC. Troponin peaked at 0.3. ECG showed NSR with septal q * ST elevation <1 mm in V1-2. History of short runs AF/AFL. PCI many years ago at [facility], but then followed at [facility] cardiology. Reports subsequent nuclear stress test x 2 without reversible ischemia. Reported CVA 1970. Current daily smoker. Previously followed by Dr. Emily Yung. Problem List  Date Reviewed: 10/13/2019          Codes Class Noted    S/P placement of cardiac pacemaker ICD-10-CM: Z95.0  ICD-9-CM: V45.01  1/10/2019        Cocaine abuse ([facility] ICD-10-CM: F14.10  ICD-9-CM: 305.60  1/6/2019              Current Outpatient Medications   Medication Sig Dispense Refill    mirtazapine (REMERON) 15 mg tablet TAKE 1/2 TABLET BY MOUTH EVERY NIGHT FOR INSOMNIA  2    atorvastatin (LIPITOR) 20 mg tablet Take 1 Tab by mouth nightly. For cholesterol 90 Tab 0    ARIPiprazole (ABILIFY) 10 mg tablet Take 10 mg by mouth daily.  dilTIAZem CD (CARDIZEM CD) 120 mg ER capsule Take 1 Cap by mouth daily. 30 Cap 1    aspirin 81 mg chewable tablet Take 1 Tab by mouth daily.  30 Tab 0     No Known Allergies  Past Medical History:   Diagnosis Date    Anxiety     CAD (coronary artery disease)     [facility] failure ([facility]     Hypertension     Psychiatric disorder     depression    Stroke ([facility]     Stroke [facility])     1970     Past Surgical History:   Procedure Laterality Date    CARDIAC SURG PROCEDURE UNLIST      HX GYN      tubal ligation    HX PACEMAKER PLACEMENT Left 2018    ME INS NEW/RPLCMT PRM PM W/TRANSV ELTRD ATRIAL&VENT  1/7/2019            Social History     Tobacco Use    Smoking status: Current Every Day Smoker     Types: Cigarettes    Smokeless tobacco: Never Used    Tobacco comment: 12/2019   Substance Use Topics    Alcohol use: Not Currently     Frequency: Never        Review of Systems:   Constitutional: Negative for fever, chills, weight loss, malaise/fatigue. HEENT: Negative for nosebleeds, vision changes. Respiratory: Negative for cough, hemoptysis  Cardiovascular: no palpitations, orthopnea, claudication, leg swelling, syncope, and PND. Gastrointestinal: Negative for nausea, vomiting, diarrhea, blood in stool and melena. Genitourinary: Negative for dysuria, and hematuria. Musculoskeletal: Negative for myalgias, arthralgia. Skin: Negative for rash. Heme: Does not bleed or bruise easily. Neurological: Negative for speech change and focal weakness     Objective:   Due to this being a TeleHealth evaluation, many elements of the physical examination are unable to be assessed. Neuro: A&Ox3, speech clear, answering questions appropriately      Assessment/Plan:       ICD-10-CM ICD-9-CM    1. Cardiac pacemaker in situ Z95.0 V45.01    2. PAT (paroxysmal atrial tachycardia) ([facility]) I47.1 427.0    3. Complete AV block ([facility]) I44.2 426.0    4. NSVT (nonsustained ventricular tachycardia) ([facility]) I47.2 427.1      Medtronic dual chamber pacemaker check on 03/11/2020 showed proper lead & generator function. Generator longevity estimated 8 yrs, 4 mos. RA 4.61%, RV 3.09%. Since 08/07/2019, 690 \"fast A&V\" & 31 episodes \"VT\"; presenting EGMs are sinus tach/AT (1:1 conduction), single AF/AFL x 39 seconds on 08/25/2019.   I explained to her stroke risk but if she continues to use cocaine and THC she can have hypertension and stroke too beside atrial fibrillation and it is risky to give her [address] because she can have [address]  She does not promise me she will stop using cocaine  I asked her to send a remote pacer check since she moves the remote box     Phone call  Duration 12 minutes    Future Appointments   Date Time Provider Tami Rosado   5/5/2020 11:30 AM [place] Cox  [address]     We discussed the expected course, resolution and complications of the diagnosis(es) in detail. Medication risks, benefits, costs, interactions, and alternatives were discussed as indicated. I advised her to contact the office if her condition worsens, changes or fails to improve as anticipated. She expressed understanding with the diagnosis(es) and plan. Patient was made aware and verbalized understanding that an appointment will be scheduled for them for a virtual visit and/or office visit within the above time frame. Patient understanding his/her responsibility to call and change time/date if he/she so chooses. Thank you for involving me in this patient's care and please call with further concerns or questions. Attila Burk M.D.   Electrophysiology/Cardiology  [facility]  [address], [place] [address], [place] [address]  [address]  [phone]

## 2020-05-05 ENCOUNTER — VIRTUAL VISIT (OUTPATIENT)
Dept: CARDIOLOGY CLINIC | Age: 70
End: 2020-05-05

## 2020-05-05 ENCOUNTER — TELEPHONE (OUTPATIENT)
Dept: CARDIOLOGY CLINIC | Age: 70
End: 2020-05-05

## 2020-05-05 DIAGNOSIS — I47.1 PAT (PAROXYSMAL ATRIAL TACHYCARDIA) (HCC): ICD-10-CM

## 2020-05-05 DIAGNOSIS — I47.29 NSVT (NONSUSTAINED VENTRICULAR TACHYCARDIA): ICD-10-CM

## 2020-05-05 DIAGNOSIS — I44.2 COMPLETE AV BLOCK (HCC): ICD-10-CM

## 2020-05-05 DIAGNOSIS — Z95.0 CARDIAC PACEMAKER IN SITU: Primary | ICD-10-CM

## 2020-05-05 NOTE — TELEPHONE ENCOUNTER
Unable to reach patient on 5/5/2020 to [place] patient for her VV with Dr. Mirza Chávez at 11:30am.    Message states: The Number you are calling has calling restrictions that presented the completion of your call. \" room air

## 2020-05-14 ENCOUNTER — TELEPHONE (OUTPATIENT)
Dept: CARDIOLOGY CLINIC | Age: 70
End: 2020-05-14

## 2020-05-14 NOTE — TELEPHONE ENCOUNTER
Called patient but was unable to reach her. Left VM to sent over manual transmission per Dr. Claudean Koh request after VV on 5-5-20 or call us back to troubleshoot. .. Dr. Romy Conte,  She does not have any future appointments set up. 3 remotes and annual Ok? Or do you want to see her sooner?  Thx

## 2020-12-15 ENCOUNTER — APPOINTMENT (OUTPATIENT)
Dept: CT IMAGING | Age: 70
End: 2020-12-15
Attending: EMERGENCY MEDICINE
Payer: MEDICARE

## 2020-12-15 ENCOUNTER — HOSPITAL ENCOUNTER (EMERGENCY)
Age: 70
Discharge: HOME | End: 2020-12-15
Attending: EMERGENCY MEDICINE
Payer: MEDICARE

## 2020-12-15 VITALS
BODY MASS INDEX: 15.99 KG/M2 | WEIGHT: 96 LBS | TEMPERATURE: 97.7 F | HEIGHT: 65 IN | RESPIRATION RATE: 16 BRPM | OXYGEN SATURATION: 98 % | SYSTOLIC BLOOD PRESSURE: 177 MMHG | DIASTOLIC BLOOD PRESSURE: 87 MMHG | HEART RATE: 74 BPM

## 2020-12-15 DIAGNOSIS — R51.9 ACUTE NONINTRACTABLE HEADACHE, UNSPECIFIED HEADACHE TYPE: Primary | ICD-10-CM

## 2020-12-15 LAB
AMPHET UR QL SCN: NEGATIVE
ANION GAP SERPL CALC-SCNC: 7 MMOL/L (ref 5–15)
BARBITURATES UR QL SCN: NEGATIVE
BASOPHILS # BLD: 0 K/UL (ref 0–0.1)
BASOPHILS NFR BLD: 1 % (ref 0–1)
BENZODIAZ UR QL: NEGATIVE
BUN SERPL-MCNC: 15 MG/DL (ref 6–20)
BUN/CREAT SERPL: 19 (ref 12–20)
CALCIUM SERPL-MCNC: 9.7 MG/DL (ref 8.5–10.1)
CANNABINOIDS UR QL SCN: NEGATIVE
CHLORIDE SERPL-SCNC: 101 MMOL/L (ref 97–108)
CO2 SERPL-SCNC: 25 MMOL/L (ref 21–32)
COCAINE UR QL SCN: POSITIVE
CREAT SERPL-MCNC: 0.8 MG/DL (ref 0.55–1.02)
DIFFERENTIAL METHOD BLD: ABNORMAL
DRUG SCRN COMMENT,DRGCM: ABNORMAL
EOSINOPHIL # BLD: 0.1 K/UL (ref 0–0.4)
EOSINOPHIL NFR BLD: 2 % (ref 0–7)
ERYTHROCYTE [DISTWIDTH] IN BLOOD BY AUTOMATED COUNT: 18.6 % (ref 11.5–14.5)
GLUCOSE SERPL-MCNC: 95 MG/DL (ref 65–100)
HCT VFR BLD AUTO: 39.2 % (ref 35–47)
HGB BLD-MCNC: 12 G/DL (ref 11.5–16)
IMM GRANULOCYTES # BLD AUTO: 0 K/UL
IMM GRANULOCYTES NFR BLD AUTO: 0 %
LYMPHOCYTES # BLD: 1.1 K/UL (ref 0.8–3.5)
LYMPHOCYTES NFR BLD: 29 % (ref 12–49)
MCH RBC QN AUTO: 21.1 PG (ref 26–34)
MCHC RBC AUTO-ENTMCNC: 30.6 G/DL (ref 30–36.5)
MCV RBC AUTO: 68.8 FL (ref 80–99)
METHADONE UR QL: NEGATIVE
MONOCYTES # BLD: 0.5 K/UL (ref 0–1)
MONOCYTES NFR BLD: 14 % (ref 5–13)
NEUTS SEG # BLD: 2.2 K/UL (ref 1.8–8)
NEUTS SEG NFR BLD: 54 % (ref 32–75)
NRBC # BLD: 0 K/UL (ref 0–0.01)
NRBC BLD-RTO: 0 PER 100 WBC
OPIATES UR QL: NEGATIVE
PCP UR QL: NEGATIVE
PLATELET # BLD AUTO: 182 K/UL (ref 150–400)
POTASSIUM SERPL-SCNC: 5 MMOL/L (ref 3.5–5.1)
RBC # BLD AUTO: 5.7 M/UL (ref 3.8–5.2)
RBC MORPH BLD: ABNORMAL
SODIUM SERPL-SCNC: 133 MMOL/L (ref 136–145)
WBC # BLD AUTO: 3.9 K/UL (ref 3.6–11)
WBC MORPH BLD: ABNORMAL

## 2020-12-15 PROCEDURE — 99285 EMERGENCY DEPT VISIT HI MDM: CPT

## 2020-12-15 PROCEDURE — 80307 DRUG TEST PRSMV CHEM ANLYZR: CPT

## 2020-12-15 PROCEDURE — 85025 COMPLETE CBC W/AUTO DIFF WBC: CPT

## 2020-12-15 PROCEDURE — 74011250637 HC RX REV CODE- 250/637: Performed by: EMERGENCY MEDICINE

## 2020-12-15 PROCEDURE — 36415 COLL VENOUS BLD VENIPUNCTURE: CPT

## 2020-12-15 PROCEDURE — 96374 THER/PROPH/DIAG INJ IV PUSH: CPT

## 2020-12-15 PROCEDURE — 80048 BASIC METABOLIC PNL TOTAL CA: CPT

## 2020-12-15 PROCEDURE — 74011250636 HC RX REV CODE- 250/636: Performed by: EMERGENCY MEDICINE

## 2020-12-15 PROCEDURE — 96375 TX/PRO/DX INJ NEW DRUG ADDON: CPT

## 2020-12-15 PROCEDURE — 74011000636 HC RX REV CODE- 636: Performed by: EMERGENCY MEDICINE

## 2020-12-15 PROCEDURE — 74011000250 HC RX REV CODE- 250: Performed by: EMERGENCY MEDICINE

## 2020-12-15 PROCEDURE — 70496 CT ANGIOGRAPHY HEAD: CPT

## 2020-12-15 RX ORDER — KETOROLAC TROMETHAMINE 30 MG/ML
15 INJECTION, SOLUTION INTRAMUSCULAR; INTRAVENOUS
Status: COMPLETED | OUTPATIENT
Start: 2020-12-15 | End: 2020-12-15

## 2020-12-15 RX ORDER — ACETAMINOPHEN 500 MG
1000 TABLET ORAL
Status: COMPLETED | OUTPATIENT
Start: 2020-12-15 | End: 2020-12-15

## 2020-12-15 RX ORDER — ONDANSETRON 2 MG/ML
4 INJECTION INTRAMUSCULAR; INTRAVENOUS
Status: COMPLETED | OUTPATIENT
Start: 2020-12-15 | End: 2020-12-15

## 2020-12-15 RX ORDER — DIPHENHYDRAMINE HYDROCHLORIDE 50 MG/ML
25 INJECTION, SOLUTION INTRAMUSCULAR; INTRAVENOUS
Status: COMPLETED | OUTPATIENT
Start: 2020-12-15 | End: 2020-12-15

## 2020-12-15 RX ADMIN — IOPAMIDOL 100 ML: 755 INJECTION, SOLUTION INTRAVENOUS at 19:07

## 2020-12-15 RX ADMIN — PROCHLORPERAZINE EDISYLATE 10 MG: 5 INJECTION, SOLUTION INTRAMUSCULAR; INTRAVENOUS at 17:48

## 2020-12-15 RX ADMIN — ONDANSETRON 4 MG: 2 INJECTION INTRAMUSCULAR; INTRAVENOUS at 17:48

## 2020-12-15 RX ADMIN — ACETAMINOPHEN 1000 MG: 500 TABLET ORAL at 17:47

## 2020-12-15 RX ADMIN — KETOROLAC TROMETHAMINE 15 MG: 30 INJECTION, SOLUTION INTRAMUSCULAR; INTRAVENOUS at 17:47

## 2020-12-15 RX ADMIN — DIPHENHYDRAMINE HYDROCHLORIDE 25 MG: 50 INJECTION, SOLUTION INTRAMUSCULAR; INTRAVENOUS at 17:47

## 2020-12-15 NOTE — ED TRIAGE NOTES
Patient presents to the ED wiht c/o headache x2 days. Pt reports taking ibuprofen. Pt reports photophobia and denies any vision changes.

## 2020-12-15 NOTE — ED PROVIDER NOTES
EMERGENCY DEPARTMENT HISTORY AND PHYSICAL EXAM      Date: 12/15/2020  Patient Name: Jluis Suarez    History of Presenting Illness     Chief Complaint   Patient presents with    Headache       History Provided By: Patient    HPI: Jluis Suarez, 78 y.o. female with history of prior CVA, depression, hypertension, CAD, presence of cardiac pacemaker diastolic CHF with last known EF 85% as of 1/30/2019 presents to the ED with cc of headache. Headache described as severe and located to left temple. Headache onset 2 days ago. Patient states that it onset suddenly but does not recall what she was doing when it onset. She denies thunderclap onset. She states that onset shortly after she last used cocaine 2 days ago. She states that pain has been constant since onset located to the left side without radiation and described as \"a bad pain\". She denies any other drug use except for cocaine, denies alcohol use. She denies any head injury or trauma, denies any recent falls. She does endorse photophobia and associated nausea without vomiting. Denies any numbness or weakness of extremities. She does not have history of headaches and states these symptoms are unusual.  She has tried taking ibuprofen for symptomatic relief without relief of symptoms. Remainder of history limited secondary to patient being a overall poor historian. She does not appear interested in my evaluation and does not participate with neurologic exam.    There are no other complaints, changes, or physical findings at this time. PCP: Burdette Olszewski., NP    No current facility-administered medications on file prior to encounter. Current Outpatient Medications on File Prior to Encounter   Medication Sig Dispense Refill    mirtazapine (REMERON) 15 mg tablet TAKE 1/2 TABLET BY MOUTH EVERY NIGHT FOR INSOMNIA  2    atorvastatin (LIPITOR) 20 mg tablet Take 1 Tab by mouth nightly.  For cholesterol 90 Tab 0    ARIPiprazole (ABILIFY) 10 mg tablet Take 10 mg by mouth daily.  dilTIAZem CD (CARDIZEM CD) 120 mg ER capsule Take 1 Cap by mouth daily. 30 Cap 1    aspirin 81 mg chewable tablet Take 1 Tab by mouth daily. 30 Tab 0       Past History     Past Medical History:  Past Medical History:   Diagnosis Date    Anxiety     CAD (coronary artery disease)     [facility] failure ([facility] [address]     Hypertension     Psychiatric disorder     depression    Stroke ([facility] [address]     Stroke ([facility] [address]     1970       Past Surgical History:  Past Surgical History:   Procedure Laterality Date    CARDIAC SURG PROCEDURE UNLIST      HX GYN      tubal ligation    HX PACEMAKER PLACEMENT Left 2018    NC INS NEW/RPLCMT PRM PM W/TRANSV ELTRD ATRIAL&VENT  1/7/2019            Family History:  History reviewed. No pertinent family history. Social History:  Social History     Tobacco Use    Smoking status: Current Every Day Smoker     Types: Cigarettes    Smokeless tobacco: Never Used    Tobacco comment: 12/2019   Substance Use Topics    Alcohol use: Not Currently     Frequency: Never    Drug use: Yes     Types: Marijuana, Cocaine     Comment: new years kwadwo - last use       Allergies:  No Known Allergies      Review of Systems   Review of Systems   Constitutional: Negative for chills and fever. Eyes: Positive for photophobia. Negative for visual disturbance. Respiratory: Negative for cough and shortness of breath. Cardiovascular: Negative for chest pain. Gastrointestinal: Positive for nausea. Negative for abdominal pain and vomiting. Musculoskeletal: Negative. Skin: Negative for color change and rash. Neurological: Positive for headaches. Negative for dizziness, weakness, light-headedness and numbness. All other systems reviewed and are negative. Physical Exam   Physical Exam  Vitals signs and nursing note reviewed. Constitutional:       General: She is not in acute distress. Appearance: Normal appearance.  She is not ill-appearing or toxic-appearing. HENT:      Head: Normocephalic and atraumatic. Nose: Nose normal.      Mouth/Throat:      Mouth: Mucous membranes are moist.   Eyes:      Extraocular Movements: Extraocular movements intact. Pupils: Pupils are equal, round, and reactive to light. Neck:      Musculoskeletal: Normal range of motion and neck supple. Cardiovascular:      Rate and Rhythm: Normal rate and regular rhythm. Heart sounds: No murmur. Pulmonary:      Effort: Pulmonary effort is normal. No respiratory distress. Breath sounds: Normal breath sounds. No wheezing. Abdominal:      General: There is no distension. Palpations: Abdomen is soft. Tenderness: There is no abdominal tenderness. There is no guarding or rebound. Musculoskeletal: Normal range of motion. General: No swelling or tenderness. Right lower leg: No edema. Left lower leg: No edema. Skin:     General: Skin is warm and dry. Coloration: Skin is not pale. Findings: No erythema. Neurological:      General: No focal deficit present. Mental Status: She is alert and oriented to person, place, and time. Comments: Cranial nerves intact, motor 5/5 throughout, sensation intact, no appreciable cerebellar deficits. Diagnostic Study Results     Labs -     Recent Results (from the past 12 hour(s))   CBC WITH AUTOMATED DIFF    Collection Time: 12/15/20  5:55 PM   Result Value Ref Range    WBC 3.9 3.6 - 11.0 K/uL    RBC 5.70 (H) 3.80 - 5.20 M/uL    HGB 12.0 11.5 - 16.0 g/dL    HCT 39.2 35.0 - 47.0 %    MCV 68.8 (L) 80.0 - 99.0 FL    MCH 21.1 (L) 26.0 - 34.0 PG    MCHC 30.6 30.0 - 36.5 g/dL    RDW 18.6 (H) 11.5 - 14.5 %    PLATELET 381 357 - 575 K/uL    NRBC 0.0 0  WBC    ABSOLUTE NRBC 0.00 0.00 - 0.01 K/uL    NEUTROPHILS 54 32 - 75 %    LYMPHOCYTES 29 12 - 49 %    MONOCYTES 14 (H) 5 - 13 %    EOSINOPHILS 2 0 - 7 %    BASOPHILS 1 0 - 1 %    IMMATURE GRANULOCYTES 0 %    ABS.  NEUTROPHILS 2.2 1.8 - 8.0 K/UL    ABS. LYMPHOCYTES 1.1 0.8 - 3.5 K/UL    ABS. MONOCYTES 0.5 0.0 - 1.0 K/UL    ABS. EOSINOPHILS 0.1 0.0 - 0.4 K/UL    ABS. BASOPHILS 0.0 0.0 - 0.1 K/UL    ABS. IMM. GRANS. 0.0 K/UL    DF MANUAL      RBC COMMENTS MICROCYTOSIS  1+        WBC COMMENTS REACTIVE LYMPHS     METABOLIC PANEL, BASIC    Collection Time: 12/15/20  5:55 PM   Result Value Ref Range    Sodium 133 (L) 136 - 145 mmol/L    Potassium 5.0 3.5 - 5.1 mmol/L    Chloride 101 97 - 108 mmol/L    CO2 25 21 - 32 mmol/L    Anion gap 7 5 - 15 mmol/L    Glucose 95 65 - 100 mg/dL    BUN 15 6 - 20 MG/DL    Creatinine 0.80 0.55 - 1.02 MG/DL    BUN/Creatinine ratio 19 12 - 20      GFR est AA >60 >60 ml/min/1.73m2    GFR est non-AA >60 >60 ml/min/1.73m2    Calcium 9.7 8.5 - 10.1 MG/DL   DRUG SCREEN, URINE    Collection Time: 12/15/20  8:04 PM   Result Value Ref Range    AMPHETAMINES Negative NEG      BARBITURATES Negative NEG      BENZODIAZEPINES Negative NEG      COCAINE Positive (A) NEG      METHADONE Negative NEG      OPIATES Negative NEG      PCP(PHENCYCLIDINE) Negative NEG      THC (TH-CANNABINOL) Negative NEG      Drug screen comment (NOTE)        Radiologic Studies -   CTA HEAD NECK W CONT   Final Result   IMPRESSION:    CT Head:   1. No evidence of acute intracranial abnormality. 2. Moderate to severe chronic microvascular ischemic disease, which has   progressed since prior exam.      CTA Head/Neck:   1. Age-indeterminate occlusion of the right vertebral artery at its origin. The   left vertebral artery and intracranial posterior circulation remain patent. 2. Approximately 60% stenosis of the proximal right internal carotid artery. 3. Approximately 50% stenosis of the proximal left internal carotid artery. 4. 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 5. Additional atherosclerotic disease as above.               CT Results  (Last 48 hours)               12/15/20 1907  CTA HEAD NECK W CONT Final result    Impression:  IMPRESSION:    CT Head:   1. No evidence of acute intracranial abnormality. 2. Moderate to severe chronic microvascular ischemic disease, which has   progressed since prior exam.       CTA Head/Neck:   1. Age-indeterminate occlusion of the right vertebral artery at its origin. The   left vertebral artery and intracranial posterior circulation remain patent. 2. Approximately 60% stenosis of the proximal right internal carotid artery. 3. Approximately 50% stenosis of the proximal left internal carotid artery. 4. 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 5. Additional atherosclerotic disease as above. Narrative:  EXAM:  CTA HEAD NECK W CONT       INDICATION:   severe headache, r/o aneurysm       COMPARISON:  CT head 7/4/2019. CONTRAST:  100 mL of Isovue-370. TECHNIQUE:  Unenhanced  images were obtained to localize the volume for   acquisition. Multislice helical axial CT angiography was performed from the   aortic arch to the top of the head during uneventful rapid bolus intravenous   contrast administration. Coronal and sagittal reformations and 3D post   processing was performed. CT dose reduction was achieved through use of a   standardized protocol tailored for this examination and automatic exposure   control for dose modulation. FINDINGS:       CT:   The ventricles are normal in size and position. Confluent areas of   periventricular and deep white matter hypodensities, progressed from prior exam,   consistent with moderate to severe chronic microangiopathic ischemic changes. Basilar cisterns are patent. No midline shift. There is no evidence of acute   infarct, hemorrhage, or extraaxial fluid collection. No evidence of abnormal   enhancement. The paranasal sinuses, mastoid air cells, and middle ears are clear. The orbital   contents are within normal limits.   There are no significant osseous or   extracranial soft tissue lesions. CTA Head:   There is no evidence of large vessel occlusion or flow-limiting stenosis of the   intracranial internal carotid, anterior cerebral, and middle cerebral arteries. Calcification of the bilateral carotid siphons with multifocal mild stenoses. The anterior communicating artery is patent. There is no evidence of large vessel occlusion or flow-limiting stenosis of the   basilar artery or posterior cerebral arteries. The posterior communicating   arteries are not seen. There is a 3 mm inferiorly directed aneurysm of the left supraclinoid internal   carotid artery (series 6 image 731). The dural venous sinuses and deep cerebral   venous system are patent. CTA NECK:   NASCET method was utilized for calculating stenosis. Mild calcific atherosclerosis of the aortic arch. The common carotid arteries   demonstrate no significant stenosis. Calcific atherosclerosis of the right   carotid bifurcation with approximately 60% stenosis of the proximal right   internal carotid artery. Calcific atherosclerosis of the left carotid   bifurcation with approximately 50% stenosis of the proximal left internal   carotid artery. Occlusion of the right vertebral artery at its origin, with discontinuous   opacification of the right V2 through V4 segments, and reconstitution of the   distal right V4 segment. The left vertebral artery is widely patent without   significant stenosis. Visualized soft tissues of the neck are unremarkable. Visualized lung apices are   clear. Left chest cardiac device noted. No acute fracture or aggressive osseous   lesion. Moderate degenerative disc disease throughout the cervical spine. CXR Results  (Last 48 hours)    None          Medical Decision Making   I am the first provider for this patient.     I reviewed the vital signs, available nursing notes, past medical history, past surgical history, family history and social history. Vital Signs-Reviewed the patient's vital signs. Patient Vitals for the past 12 hrs:   Temp Pulse Resp BP SpO2   12/15/20 2003 -- 73 16 (!) 153/81 99 %   12/15/20 1908 -- 62 16 (!) 186/66 99 %   12/15/20 1800 -- 79 18 (!) 202/85 100 %   12/15/20 1756 -- 84 16 (!) 197/95 100 %   12/15/20 1618 97.9 °F (36.6 °C) 87 16 (!) 158/93 97 %       Records Reviewed: Nursing Notes, Old Medical Records, Previous Radiology Studies and Previous Laboratory Studies  I personally reviewed Cardiology EP records from 5/5/20. Provider Notes (Medical Decision Making): This is a 78 y.o. female here with severe headache onset 2 days ago resulting in nausea without vomiting and photophobia. On examination patient appears clinically well and nontoxic. Vital signs stable throughout entire ED stay and patient is afebrile. She has no focal neurologic deficits on my evaluation although this is somewhat limited due to patient's lack of cooperation with exam.  Differential diagnosis includes: ICH, SAH, complex migraine, cocaine induced headache. I will pursue work-up consisting of CT head, CTA head and neck, and blood work and reassess the patient. I discussed my high concern for [facility] inpatient especially given her cocaine abuse. Patient has several red flags concerning for [facility]. I discussed gold standard diagnosis is performed via LP, however patient adamantly refuses for LP to be performed. She understands risks of refusing LP including unable to diagnose SAH, risk of missed SAH potentially resulting in death and she would like to defer LP for now. In substitution I will perform CT head and CTA head and neck to rule out aneurysm as this would be next best diagnostic measures to rule out SAH. Will reassess patient after administration of migraine cocktail. Strongly discussed cocaine cessation as she has prior h/o coronary disease, CVA, and presence of pacemaker with diastolic CHF.     ED Course: Initial assessment performed. The patients presenting problems have been discussed, and they are in agreement with the care plan formulated and outlined with them. I have encouraged them to ask questions as they arise throughout their visit. ED Course as of Dec 15 2047   Tue Dec 15, 2020   9074 Currently awaiting CT head and CTA. On reassessment patient feeling much improved, resting in bed comfortably without any change in status. Currently rates pain 1/10. [AK]      ED Course User Index  [AK] Keerthi Cerda MD     8:44 PM: CT head and CTA negative for acute bleed however symptoms onset 2 days ago making CT head unreliable for detection of SAH. CTA does actually demonstrate 3 mm supraclinoid left ICA aneurysm. I discussed with patient importance of performing LP to rule out SAH. Patient is still refusing LP. She understands the risks including missed diagnosis of SAH and potential death. Her pain is now 0/10, she feels back to baseline and is requesting discharge home. It has been 3 hours since her last dose of medication and she is at her baseline mental status. She has capacity to make her own decisions and she has capacity to refuse medical care. She is not intoxicated. This discussion was witnessed by [employer]. Patient given strict return to ED precautions and she agrees with plan. TOBACCO COUNSELING:  Upon evaluation, the patient expressed that they are a current tobacco user. For at least 3 minutes, the patient has been counseled on the dangers of smoking and was encouraged to quit as soon as possible in order to decrease further risks to their health. Patient has conveyed their understanding of the risks involved should they continue to use tobacco products. Patient has been offered various resources to help with their tobacco dependence. Discharge Note:  The patient has been re-evaluated and is ready for discharge. Reviewed available results with patient.  Counseled patient on diagnosis and care plan. Patient has expressed understanding, and all questions have been answered. Patient agrees with plan and agrees to follow up as recommended, or to return to the ED if their symptoms worsen. Discharge instructions have been provided and explained to the patient, along with reasons to return to the ED. Disposition:  Discharge      DISCHARGE PLAN:  1. Current Discharge Medication List        2. Follow-up Information     Follow up With Specialties Details Why Contact Info    Alvia Baumgarten., NP Nurse Practitioner Schedule an appointment as soon as possible for a visit   [address]  [phone]          3. Return to ED if worse     Diagnosis     Clinical Impression:   1. Acute nonintractable headache, unspecified headache type        Attestations:    Saulo Stanley MD    Please note that this dictation was completed with AppSheet, the computer voice recognition software. Quite often unanticipated grammatical, syntax, homophones, and other interpretive errors are inadvertently transcribed by the computer software. Please disregard these errors. Please excuse any errors that have escaped final proofreading. Thank you.

## 2020-12-15 NOTE — ED NOTES
Pt in with headache, photophobia, nausea x 2 days unrelieved with ibuprofen. Pt denies any trauma. Pt alert, answers questions appropriately, cries out in pain \"help me mommy\". Call bell in reach. Emergency Department Nursing Plan of Care       The Nursing Plan of Care is developed from the Nursing assessment and Emergency Department Attending provider initial evaluation. The plan of care may be reviewed in the ED Provider note.     The Plan of Care was developed with the following considerations:   Patient / Family readiness to learn indicated by:verbalized understanding  Persons(s) to be included in education: patient  Barriers to Learning/Limitations:No    Signed     Daysi Cabrera RN    12/15/2020   4:44 PM

## 2020-12-15 NOTE — ED NOTES
Bedside and Verbal shift change report given to Sara Aquino RN (oncoming nurse) by Gita Muniz RN (offgoing nurse). Patient in CT at shift change. Report included the following information SBAR, ED Summary, MAR and Recent Results.

## 2020-12-16 NOTE — DISCHARGE INSTRUCTIONS
You were evaluated in the emergency department for headache. Your examination was reassuring as was your work-up including blood work and CT scan. This did show any brain bleed but it did show a small 3mm aneurysm which could cause a type of brain bleed called a subarachnoid hemorrhage. It was recommended we evaluate this with a Lumbar Puncture but you refused. It will be important for you to follow-up with your primary care physician in 2-3 days. If you develop worsening symptoms such as worsening headache, change in your vision, numbness/weakness, or would like to be evaluated with a lumbar puncture, please return to the emergency department immediately.

## 2020-12-16 NOTE — ED NOTES
Per MD, LP needed to r/o brain bleed. Pt will need to metabolize medications for migraine and then consent or refuse LP.

## 2020-12-16 NOTE — ED NOTES
Discharge instructions were given to the patient by Sudha Zepeda RN. The patient left the Emergency Department ambulatory, alert and oriented and in no acute distress with 0 prescriptions. The patient was encouraged to call or return to the ED for worsening issues or problems and was encouraged to schedule a follow up appointment for continuing care. The patient verbalized understanding of discharge instructions and prescriptions, all questions were answered. The patient has no further concerns at this time.

## 2020-12-16 NOTE — ED NOTES
Requested urine sample from pt, pt not responding to this RN. Pt continues to rest w/ eyes closed in R side lying position and in no acute distress. RR even and unlabored. Skin warm and dry. Cardiac monitor x 2. Call bell in reach.

## 2020-12-16 NOTE — ED NOTES
MD and this RN at bedside attempting to explain need for LP. Pt able to answer all questions appropriately and have a full conversation, refusing LP at this time. Pt able to respond to verbal stimuli and speak in full, coherent sentences. Speech clear. Pt states \"I'm like this because I'm tired from the four shots in my arm from the nurse\". Pt A and O x 4. RR even and unlabored. Skin warm and dry. Call bell in reach.

## 2021-03-15 ENCOUNTER — HOSPITAL ENCOUNTER (EMERGENCY)
Age: 71
Discharge: HOME | End: 2021-03-15
Attending: EMERGENCY MEDICINE
Payer: MEDICARE

## 2021-03-15 VITALS
RESPIRATION RATE: 16 BRPM | OXYGEN SATURATION: 97 % | SYSTOLIC BLOOD PRESSURE: 122 MMHG | HEART RATE: 93 BPM | TEMPERATURE: 97 F | DIASTOLIC BLOOD PRESSURE: 70 MMHG

## 2021-03-15 DIAGNOSIS — R51.9 NONINTRACTABLE EPISODIC HEADACHE, UNSPECIFIED HEADACHE TYPE: Primary | ICD-10-CM

## 2021-03-15 PROCEDURE — 74011000258 HC RX REV CODE- 258: Performed by: EMERGENCY MEDICINE

## 2021-03-15 PROCEDURE — 96375 TX/PRO/DX INJ NEW DRUG ADDON: CPT

## 2021-03-15 PROCEDURE — 96374 THER/PROPH/DIAG INJ IV PUSH: CPT

## 2021-03-15 PROCEDURE — 99283 EMERGENCY DEPT VISIT LOW MDM: CPT

## 2021-03-15 PROCEDURE — 74011250636 HC RX REV CODE- 250/636: Performed by: EMERGENCY MEDICINE

## 2021-03-15 RX ORDER — DIPHENHYDRAMINE HYDROCHLORIDE 50 MG/ML
12.5 INJECTION, SOLUTION INTRAMUSCULAR; INTRAVENOUS
Status: COMPLETED | OUTPATIENT
Start: 2021-03-15 | End: 2021-03-15

## 2021-03-15 RX ADMIN — DIPHENHYDRAMINE HYDROCHLORIDE 12.5 MG: 50 INJECTION, SOLUTION INTRAMUSCULAR; INTRAVENOUS at 17:49

## 2021-03-15 RX ADMIN — METOCLOPRAMIDE 10 MG: 5 INJECTION, SOLUTION INTRAMUSCULAR; INTRAVENOUS at 17:49

## 2021-03-15 NOTE — ED TRIAGE NOTES
Triage: Pt arrives from home via EMS for CC of headache. When initially approached pt is sitting still and calmly waiting for triage but immediately upon entering the triage phillip she beings thrashing and flailing. She reports he has had this headache for 3 days. She has taken an aspirin for her headache without relief.

## 2021-05-27 ENCOUNTER — CLINICAL SUPPORT (OUTPATIENT)
Dept: CARDIOLOGY CLINIC | Age: 71
End: 2021-05-27
Payer: MEDICARE

## 2021-05-27 ENCOUNTER — OFFICE VISIT (OUTPATIENT)
Dept: CARDIOLOGY CLINIC | Age: 71
End: 2021-05-27
Payer: MEDICARE

## 2021-05-27 VITALS
HEART RATE: 83 BPM | SYSTOLIC BLOOD PRESSURE: 126 MMHG | DIASTOLIC BLOOD PRESSURE: 70 MMHG | HEIGHT: 65 IN | BODY MASS INDEX: 17.66 KG/M2 | WEIGHT: 106 LBS

## 2021-05-27 DIAGNOSIS — I10 ESSENTIAL HYPERTENSION: ICD-10-CM

## 2021-05-27 DIAGNOSIS — I47.29 NSVT (NONSUSTAINED VENTRICULAR TACHYCARDIA): ICD-10-CM

## 2021-05-27 DIAGNOSIS — Z95.0 CARDIAC PACEMAKER IN SITU: Primary | ICD-10-CM

## 2021-05-27 DIAGNOSIS — I47.1 PAT (PAROXYSMAL ATRIAL TACHYCARDIA) (HCC): ICD-10-CM

## 2021-05-27 DIAGNOSIS — I44.2 COMPLETE AV BLOCK (HCC): ICD-10-CM

## 2021-05-27 DIAGNOSIS — I48.0 PAF (PAROXYSMAL ATRIAL FIBRILLATION) (HCC): ICD-10-CM

## 2021-05-27 DIAGNOSIS — I51.7 LVH (LEFT VENTRICULAR HYPERTROPHY): ICD-10-CM

## 2021-05-27 PROCEDURE — G8400 PT W/DXA NO RESULTS DOC: HCPCS | Performed by: INTERNAL MEDICINE

## 2021-05-27 PROCEDURE — G8432 DEP SCR NOT DOC, RNG: HCPCS | Performed by: INTERNAL MEDICINE

## 2021-05-27 PROCEDURE — G8536 NO DOC ELDER MAL SCRN: HCPCS | Performed by: INTERNAL MEDICINE

## 2021-05-27 PROCEDURE — G0463 HOSPITAL OUTPT CLINIC VISIT: HCPCS | Performed by: INTERNAL MEDICINE

## 2021-05-27 PROCEDURE — 93280 PM DEVICE PROGR EVAL DUAL: CPT | Performed by: INTERNAL MEDICINE

## 2021-05-27 PROCEDURE — G8754 DIAS BP LESS 90: HCPCS | Performed by: INTERNAL MEDICINE

## 2021-05-27 PROCEDURE — 3017F COLORECTAL CA SCREEN DOC REV: CPT | Performed by: INTERNAL MEDICINE

## 2021-05-27 PROCEDURE — G8419 CALC BMI OUT NRM PARAM NOF/U: HCPCS | Performed by: INTERNAL MEDICINE

## 2021-05-27 PROCEDURE — 1101F PT FALLS ASSESS-DOCD LE1/YR: CPT | Performed by: INTERNAL MEDICINE

## 2021-05-27 PROCEDURE — 1090F PRES/ABSN URINE INCON ASSESS: CPT | Performed by: INTERNAL MEDICINE

## 2021-05-27 PROCEDURE — 99214 OFFICE O/P EST MOD 30 MIN: CPT | Performed by: INTERNAL MEDICINE

## 2021-05-27 PROCEDURE — G8752 SYS BP LESS 140: HCPCS | Performed by: INTERNAL MEDICINE

## 2021-05-27 PROCEDURE — G8427 DOCREV CUR MEDS BY ELIG CLIN: HCPCS | Performed by: INTERNAL MEDICINE

## 2021-05-27 NOTE — PROGRESS NOTES
Cardiac Electrophysiology Office VISIT Note     Subjective:      Shraddha Lock is a 78 y.o. patient who presents for follow up of Medtronic dual chamber pacemaker (DOI 01/07/2019). Her daughter is with her  She reports occasional chest discomfort, worse on inspiration. Frequent headaches as well. Trying to stop cocaine use, but has used somewhat recently. Occasional chest discomfort, worse with deep inspiration. Frequent headaches. BP well controlled. On ASA 81 mg po daily, denies bleeding issues. Previous:  ER visit for headache 03/15/2021. Declined imaging. CTA head & neck in 12/2020 showed moderate to severe chronic microvascular ischemic disease, but no acute intracranial abnormality. 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 Left vertebral artery & intracranial posterior circulation patent. 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 ER visit for chest pain & syncope 07/04/2019. Onset while seated, gradually worsened, heat compounded issue. Witness saw her clutch her chest, LOC. EMS reported BP 96/36, responded well to NS. Tested + for cocaine & THC. Troponin negative. Medtronic dual chamber pacemaker 01/07/2019 for intermittent 3rd degree AVB & PSVT. Ventricular septum & RV thickness markedly increased. Increased thickness of atrial septum c/w lipomatous hypertrophy. Admitted 01/2019 with chest pain. Drug screen + cocaine & THC. Troponin peaked at 0.3. ECG showed NSR with septal q * ST elevation <1 mm in V1-2. History of short runs AF/AFL. PCI many years ago at [address] [place] [address], but then followed at [facility] cardiology. Reports subsequent nuclear stress test x 2 without reversible ischemia. Reported CVA 1970. Current daily smoker. Previously followed by Dr. Juliocesar Walker.       Problem List  Date Reviewed: 5/27/2021        Codes Class Noted S/P placement of cardiac pacemaker ICD-10-CM: Z95.0  ICD-9-CM: V45.01  1/10/2019        Cocaine abuse ([facility] ICD-10-CM: F14.10  ICD-9-CM: 305.60  1/6/2019              Current Outpatient Medications   Medication Sig Dispense Refill    atorvastatin (LIPITOR) 20 mg tablet Take 1 Tab by mouth nightly. For cholesterol 90 Tab 0    ARIPiprazole (ABILIFY) 10 mg tablet Take 10 mg by mouth daily.  dilTIAZem CD (CARDIZEM CD) 120 mg ER capsule Take 1 Cap by mouth daily. 30 Cap 1    aspirin 81 mg chewable tablet Take 1 Tab by mouth daily. (Patient taking differently: Take 162 mg by mouth daily.) 30 Tab 0    mirtazapine (REMERON) 15 mg tablet TAKE 1/2 TABLET BY MOUTH EVERY NIGHT FOR INSOMNIA (Patient not taking: Reported on 5/27/2021)  2     No Known Allergies  Past Medical History:   Diagnosis Date    Anxiety     CAD (coronary artery disease)     Heart failure ([facility]     Hypertension     Psychiatric disorder     depression    Stroke ([facility]     Stroke ([facility]     1970     Past Surgical History:   Procedure Laterality Date    HX GYN      tubal ligation    HX PACEMAKER PLACEMENT Left 2018    [place] CARDIAC SURG PROCEDURE UNLIST      [place] INS NEW/RPLCMT PRM PM W/TRANSV ELTRD ATRIAL&VENT  1/7/2019            Social History     Tobacco Use    Smoking status: Current Every Day Smoker     Types: Cigarettes    Smokeless tobacco: Never Used    Tobacco comment: 12/2019   Substance Use Topics    Alcohol use: Not Currently        Review of Systems:   Constitutional: Negative for fever, chills, weight loss, malaise/fatigue. HEENT: Negative for nosebleeds, vision changes. + headaches  Respiratory: Negative for cough, hemoptysis. + occasional pain on inspiration. Cardiovascular: + occasional chest discomfort, occasional palpitations, no orthopnea, claudication, leg swelling, syncope, and no PND. Gastrointestinal: Negative for nausea, vomiting, diarrhea, blood in stool and melena.    Genitourinary: Negative for dysuria, and hematuria. Musculoskeletal: Negative for myalgias, arthralgia. Skin: Negative for rash. Heme: Does not bleed or bruise easily. Neurological: Negative for speech change and focal weakness. Objective:     Visit Vitals  /70   Pulse 83   Ht 5' 5\" (1.651 m)   Wt 106 lb (48.1 kg)   BMI 17.64 kg/m²     Physical Exam:   Constitutional: Well-developed and well-nourished. No respiratory distress. Head: Normocephalic and atraumatic. Eyes: Pupils are equal, round  ENT: hearing normal  Neck: supple. No JVD present. Cardiovascular: Normal rate, regular rhythm. Exam reveals no gallop and no friction rub. No murmur heard. Pulmonary/Chest: Effort normal and breath sounds normal. No wheezes. Abdominal: Soft, no tenderness. Musculoskeletal: Moves extremities independently. Normal gait. Vasc/lymphatic: No edema. Neurological: Alert, oriented. Skin: Skin is warm and dry. Left chest pacer site well healed, slightly prominent. Psychiatric: Normal mood and affect. Behavior is normal. Judgment and thought content normal.        Assessment/Plan:     Imaging/Studies:  Echo (01/03/2019): LVEF 85%, small LV cavity, severe concentric LVH, grade 3 diastolic dysfunction. Markedly increased thickness of ventricular septum. RV wall thickness. Increased thickness of septum, consistent with lipomatous hypertrophy. Mild MR. Stress echo (06/02/2009): Normal resting LVEF. Post exercise, adequate hyperdynamic response in all myocardial segments. Negative stress test.        ICD-10-CM ICD-9-CM    1. Cardiac pacemaker in situ  Z95.0 V45.01    2. Complete AV block (HCC)  I44.2 426.0    3. PAT (paroxysmal atrial tachycardia) ([facility])  I47.1 427.0    4. NSVT (nonsustained ventricular tachycardia) ([facility])  I47.2 427.1    5. PAF (paroxysmal atrial fibrillation) ([facility])  I48.0 427.31    6. Essential hypertension  I10 401.9    7.  LVH (left ventricular hypertrophy)  I51.7 429.3        Medtronic dual chamber pacemaker (DOI 01/07/2019): Device check today shows proper lead & generator function. Generator longevity estimated 7 yrs, 8 mos. RA 4.41%, RV 1.17%. Since 04/30/2019 device detected 2413 \"fast A&V\", 549 episodes of VT & 6 AT/AF episodes. Most presenting EGMs are ST/AT (1:1 conduction). Episode of PAF x 29 min, 24 seconds with average V rate 170 bpm. New remote box given today. She said the old one is not working    PAF: Episode lasting 29 minutes noted on 12/29/2020, had RVR. Occasional mild palpitations. States trying to stop cocaine use but has used it somewhat recently; suspect this coincided with cocaine abuse. With cocaine, she is at risk for HTN & CVA. Too high risk for ICH, will not start [facility]. Continue ASA 81 mg po daily. Continue diltiazem as previously prescribed. HTN: Significant concentric LVH & RVH on last echo, but BP is well controlled today. Continue diltiazem as prescribed. Remote pacer checks q 3 months. EP clinic follow up in 1 year. Future Appointments   Date Time Provider Tami Rosado   9/15/2021  4:45 PM REMOTE1, JAMIL RICHARDSON BS AMB   12/20/2021  3:00 PM REMOTE1, JAMIL RICHARDSON BS AMB   3/28/2022 10:00 AM REMOTE1, JAMIL RICHARDSON BS AMB   6/28/2022  1:20 PM PACEMAKER3, JAMIL RICHARDSON BS AMB   6/28/2022  2:00 PM MD DEBRA Olivier BS AMB     Thank you for involving me in this patient's care and please call with further concerns or questions. Estrella Burnham M.D.   Electrophysiology/Cardiology  [facility] [place]  [address], [place] [address], [address]  [address], [address]                             [address] [place] [address]  [phone]

## 2021-09-15 ENCOUNTER — OFFICE VISIT (OUTPATIENT)
Dept: CARDIOLOGY CLINIC | Age: 71
End: 2021-09-15
Payer: MEDICARE

## 2021-09-15 DIAGNOSIS — Z95.0 CARDIAC PACEMAKER IN SITU: Primary | ICD-10-CM

## 2021-09-15 PROCEDURE — 93296 REM INTERROG EVL PM/IDS: CPT | Performed by: INTERNAL MEDICINE

## 2021-09-15 NOTE — LETTER
9/16/2021 7:44 AM    Ms. Shahida Izquierdo  [phone]            This letter confirms that we have received your scheduled remote check of your implanted     device on 9-15-21  . Our EP team will contact you via phone if there are significant abnormal    findings. Your next remote check from home is scheduled for 12-20-21  . If you have any questions, please call [address] at [phone].                Sincerely,    Megha Garrett MD [facility]

## 2021-09-17 ENCOUNTER — TELEPHONE (OUTPATIENT)
Dept: CARDIOLOGY CLINIC | Age: 71
End: 2021-09-17

## 2021-09-17 ENCOUNTER — DOCUMENTATION ONLY (OUTPATIENT)
Dept: CARDIOLOGY CLINIC | Age: 71
End: 2021-09-17

## 2021-09-17 DIAGNOSIS — I25.118 CORONARY ARTERY DISEASE OF NATIVE HEART WITH STABLE ANGINA PECTORIS, UNSPECIFIED VESSEL OR LESION TYPE (HCC): ICD-10-CM

## 2021-09-17 DIAGNOSIS — R07.9 CHEST PAIN, UNSPECIFIED TYPE: Primary | ICD-10-CM

## 2021-09-17 NOTE — LETTER
10/1/2021     Ms. Anton [address] office has been unable to contact you via telephone number provided (mailbox not set up). Your recent pacemaker check noted an episode of ventricular tachycardia (increased rate of lower chambers in the heart). Due to this and your history of chest pain/and cardiac stent placement, Dr. Leslye Chiu would like for you to have a nuclear stress test done to further evaluate heart function/blood flow. This can be done in our office. Please call [phone] to further discuss and schedule.                     Sincerely,        Dede Alas RN

## 2021-09-17 NOTE — PROGRESS NOTES
Pacemaker check showed on August 10 2021, the patient had 6 seconds of ventricular tachycardia.   The other episodes were atrial tachycardia    She had hx of chest pain and PCI at [facility]    [facility]

## 2021-09-17 NOTE — TELEPHONE ENCOUNTER
Pacemaker check showed on August 10 2021, the patient had 6 seconds of ventricular tachycardia. The other episodes were atrial tachycardia     She had hx of chest pain and PCI at [facility]     Recommend lexiscan cardiolite stress test    Attempted to reach patient by telephone. \"mailbox not set up\". Unable to leave message.

## 2021-09-23 NOTE — TELEPHONE ENCOUNTER
Attempted to contact patient on home phone. Busy signal. Attempted to contact patient on cell phone. Unable to leave message.

## 2021-09-27 NOTE — TELEPHONE ENCOUNTER
Called home phone. Mailbox not set up. Unable to leave a message    Called mobile phone. Left message with male. Call cannot be completed with phone number for Bharat Ochoa.

## 2022-01-19 ENCOUNTER — TELEPHONE (OUTPATIENT)
Dept: CARDIOLOGY CLINIC | Age: 72
End: 2022-01-19

## 2022-01-19 NOTE — TELEPHONE ENCOUNTER
Left voice message requesting a return call to reschedule 6/28/2022 Gregorio and Dr. Mejia Retort appointments to next available. *Any  can assist in rescheduling appointments.

## 2022-03-18 PROBLEM — F14.10 COCAINE ABUSE (HCC): Status: ACTIVE | Noted: 2019-01-06

## 2022-03-18 PROBLEM — Z95.0 S/P PLACEMENT OF CARDIAC PACEMAKER: Status: ACTIVE | Noted: 2019-01-10

## 2022-05-10 ENCOUNTER — APPOINTMENT (OUTPATIENT)
Dept: CT IMAGING | Age: 72
End: 2022-05-10
Attending: EMERGENCY MEDICINE
Payer: MEDICARE

## 2022-05-10 ENCOUNTER — HOSPITAL ENCOUNTER (EMERGENCY)
Age: 72
Discharge: HOME | End: 2022-05-10
Attending: EMERGENCY MEDICINE
Payer: MEDICARE

## 2022-05-10 VITALS
SYSTOLIC BLOOD PRESSURE: 150 MMHG | DIASTOLIC BLOOD PRESSURE: 83 MMHG | WEIGHT: 109.13 LBS | BODY MASS INDEX: 18.18 KG/M2 | RESPIRATION RATE: 17 BRPM | OXYGEN SATURATION: 96 % | HEART RATE: 79 BPM | TEMPERATURE: 98.3 F | HEIGHT: 65 IN

## 2022-05-10 DIAGNOSIS — R51.9 ACUTE NONINTRACTABLE HEADACHE, UNSPECIFIED HEADACHE TYPE: Primary | ICD-10-CM

## 2022-05-10 LAB
ALBUMIN SERPL-MCNC: 3.2 G/DL (ref 3.5–5)
ALBUMIN/GLOB SERPL: 0.6 {RATIO} (ref 1.1–2.2)
ALP SERPL-CCNC: 99 U/L (ref 45–117)
ALT SERPL-CCNC: 7 U/L (ref 12–78)
ANION GAP SERPL CALC-SCNC: 6 MMOL/L (ref 5–15)
APPEARANCE UR: CLEAR
AST SERPL-CCNC: 11 U/L (ref 15–37)
ATRIAL RATE: 87 BPM
BASOPHILS # BLD: 0 K/UL (ref 0–0.1)
BASOPHILS NFR BLD: 0 % (ref 0–1)
BILIRUB SERPL-MCNC: 0.3 MG/DL (ref 0.2–1)
BILIRUB UR QL: NEGATIVE
BUN SERPL-MCNC: 16 MG/DL (ref 6–20)
BUN/CREAT SERPL: 20 (ref 12–20)
CALCIUM SERPL-MCNC: 9.1 MG/DL (ref 8.5–10.1)
CALCULATED P AXIS, ECG09: 73 DEGREES
CALCULATED R AXIS, ECG10: -20 DEGREES
CALCULATED T AXIS, ECG11: 71 DEGREES
CHLORIDE SERPL-SCNC: 108 MMOL/L (ref 97–108)
CO2 SERPL-SCNC: 23 MMOL/L (ref 21–32)
COLOR UR: NORMAL
CREAT SERPL-MCNC: 0.8 MG/DL (ref 0.55–1.02)
DIAGNOSIS, 93000: NORMAL
DIFFERENTIAL METHOD BLD: ABNORMAL
EOSINOPHIL # BLD: 0.1 K/UL (ref 0–0.4)
EOSINOPHIL NFR BLD: 1 % (ref 0–7)
ERYTHROCYTE [DISTWIDTH] IN BLOOD BY AUTOMATED COUNT: 17.1 % (ref 11.5–14.5)
GLOBULIN SER CALC-MCNC: 5.6 G/DL (ref 2–4)
GLUCOSE BLD STRIP.AUTO-MCNC: 107 MG/DL (ref 65–117)
GLUCOSE SERPL-MCNC: 105 MG/DL (ref 65–100)
GLUCOSE UR STRIP.AUTO-MCNC: NEGATIVE MG/DL
HCT VFR BLD AUTO: 35.9 % (ref 35–47)
HGB BLD-MCNC: 11.2 G/DL (ref 11.5–16)
HGB UR QL STRIP: NEGATIVE
IMM GRANULOCYTES # BLD AUTO: 0.1 K/UL (ref 0–0.04)
IMM GRANULOCYTES NFR BLD AUTO: 1 % (ref 0–0.5)
KETONES UR QL STRIP.AUTO: NEGATIVE MG/DL
LEUKOCYTE ESTERASE UR QL STRIP.AUTO: NEGATIVE
LYMPHOCYTES # BLD: 1 K/UL (ref 0.8–3.5)
LYMPHOCYTES NFR BLD: 20 % (ref 12–49)
MCH RBC QN AUTO: 22.2 PG (ref 26–34)
MCHC RBC AUTO-ENTMCNC: 31.2 G/DL (ref 30–36.5)
MCV RBC AUTO: 71.2 FL (ref 80–99)
MONOCYTES # BLD: 0.3 K/UL (ref 0–1)
MONOCYTES NFR BLD: 6 % (ref 5–13)
NEUTS SEG # BLD: 3.5 K/UL (ref 1.8–8)
NEUTS SEG NFR BLD: 72 % (ref 32–75)
NITRITE UR QL STRIP.AUTO: NEGATIVE
NRBC # BLD: 0 K/UL (ref 0–0.01)
NRBC BLD-RTO: 0 PER 100 WBC
P-R INTERVAL, ECG05: 136 MS
PH UR STRIP: 8 [PH] (ref 5–8)
PLATELET # BLD AUTO: 138 K/UL (ref 150–400)
PMV BLD AUTO: ABNORMAL FL (ref 8.9–12.9)
POTASSIUM SERPL-SCNC: 3.3 MMOL/L (ref 3.5–5.1)
PROT SERPL-MCNC: 8.8 G/DL (ref 6.4–8.2)
PROT UR STRIP-MCNC: NEGATIVE MG/DL
Q-T INTERVAL, ECG07: 366 MS
QRS DURATION, ECG06: 68 MS
QTC CALCULATION (BEZET), ECG08: 440 MS
RBC # BLD AUTO: 5.04 M/UL (ref 3.8–5.2)
RBC MORPH BLD: ABNORMAL
SERVICE CMNT-IMP: NORMAL
SODIUM SERPL-SCNC: 137 MMOL/L (ref 136–145)
SP GR UR REFRACTOMETRY: 1.01 (ref 1–1.03)
TROPONIN-HIGH SENSITIVITY: 26 NG/L (ref 0–51)
UROBILINOGEN UR QL STRIP.AUTO: 0.2 EU/DL (ref 0.2–1)
VENTRICULAR RATE, ECG03: 87 BPM
WBC # BLD AUTO: 5 K/UL (ref 3.6–11)

## 2022-05-10 PROCEDURE — 81003 URINALYSIS AUTO W/O SCOPE: CPT

## 2022-05-10 PROCEDURE — 82962 GLUCOSE BLOOD TEST: CPT

## 2022-05-10 PROCEDURE — 84484 ASSAY OF TROPONIN QUANT: CPT

## 2022-05-10 PROCEDURE — 96374 THER/PROPH/DIAG INJ IV PUSH: CPT

## 2022-05-10 PROCEDURE — 74011000250 HC RX REV CODE- 250: Performed by: EMERGENCY MEDICINE

## 2022-05-10 PROCEDURE — 80053 COMPREHEN METABOLIC PANEL: CPT

## 2022-05-10 PROCEDURE — 99284 EMERGENCY DEPT VISIT MOD MDM: CPT

## 2022-05-10 PROCEDURE — 70450 CT HEAD/BRAIN W/O DYE: CPT

## 2022-05-10 PROCEDURE — 74011250636 HC RX REV CODE- 250/636: Performed by: EMERGENCY MEDICINE

## 2022-05-10 PROCEDURE — 36415 COLL VENOUS BLD VENIPUNCTURE: CPT

## 2022-05-10 PROCEDURE — 85025 COMPLETE CBC W/AUTO DIFF WBC: CPT

## 2022-05-10 PROCEDURE — 96375 TX/PRO/DX INJ NEW DRUG ADDON: CPT

## 2022-05-10 PROCEDURE — 93005 ELECTROCARDIOGRAM TRACING: CPT

## 2022-05-10 RX ORDER — KETOROLAC TROMETHAMINE 30 MG/ML
15 INJECTION, SOLUTION INTRAMUSCULAR; INTRAVENOUS
Status: COMPLETED | OUTPATIENT
Start: 2022-05-10 | End: 2022-05-10

## 2022-05-10 RX ORDER — DEXAMETHASONE SODIUM PHOSPHATE 10 MG/ML
10 INJECTION INTRAMUSCULAR; INTRAVENOUS ONCE
Status: COMPLETED | OUTPATIENT
Start: 2022-05-10 | End: 2022-05-10

## 2022-05-10 RX ORDER — DIPHENHYDRAMINE HYDROCHLORIDE 50 MG/ML
25 INJECTION, SOLUTION INTRAMUSCULAR; INTRAVENOUS
Status: COMPLETED | OUTPATIENT
Start: 2022-05-10 | End: 2022-05-10

## 2022-05-10 RX ORDER — HYDRALAZINE HYDROCHLORIDE 20 MG/ML
10 INJECTION INTRAMUSCULAR; INTRAVENOUS ONCE
Status: COMPLETED | OUTPATIENT
Start: 2022-05-10 | End: 2022-05-10

## 2022-05-10 RX ADMIN — KETOROLAC TROMETHAMINE 15 MG: 30 INJECTION, SOLUTION INTRAMUSCULAR at 03:21

## 2022-05-10 RX ADMIN — DIPHENHYDRAMINE HYDROCHLORIDE 25 MG: 50 INJECTION INTRAMUSCULAR; INTRAVENOUS at 03:19

## 2022-05-10 RX ADMIN — PROCHLORPERAZINE EDISYLATE 10 MG: 5 INJECTION, SOLUTION INTRAMUSCULAR; INTRAVENOUS at 03:18

## 2022-05-10 RX ADMIN — DEXAMETHASONE SODIUM PHOSPHATE 10 MG: 10 INJECTION INTRAMUSCULAR; INTRAVENOUS at 03:20

## 2022-05-10 RX ADMIN — HYDRALAZINE HYDROCHLORIDE 10 MG: 20 INJECTION, SOLUTION INTRAMUSCULAR; INTRAVENOUS at 02:15

## 2022-05-10 NOTE — ED PROVIDER NOTES
Date of Service:  05/10/22      Patient:  Ilya Snyder    Chief Complaint:  Headache       HPI:  Ilya Snyder is a 73 y.o.  female who presents for evaluation of a headache. Patient arrives as a possible stroke alert in the prehospital setting. Patient apparently was having some mental status change. EMS arrived to the house to find the patient complaining of a severe headache and being uncooperative. Limited history. Patient arrives here awake alert answering questions following commands but very unwilling to participate. She states that the only thing that hurts her is her head. No other information available. Patient unwilling to elaborate             Past Medical History:   Diagnosis Date    Anxiety     CAD (coronary artery disease)     [facility]     Hypertension     Psychiatric disorder     depression    St[facility]     Stroke ([facility]     1970       Past Surgical History:   Procedure Laterality Date    HX GYN      tubal ligation    HX PACEMAKER PLACEMENT Left 2018    [place] CARDIAC SURG PROCEDURE UNLIST      [place] INS NEW/RPLCMT PRM PM W/TRANSV ELTRD ATRIAL&VENT  1/7/2019              No family history on file.     Social History     Socioeconomic History    Marital status:      Spouse name: Not on file    Number of children: Not on file    Years of education: Not on file    Highest education level: Not on file   Occupational History    Not on file   Tobacco Use    Smoking status: Current Every Day Smoker     Types: Cigarettes    Smokeless tobacco: Never Used    Tobacco comment: 12/2019   Substance and Sexual Activity    Alcohol use: Not Currently    Drug use: Yes     Types: Marijuana, Cocaine     Comment: new years kwadwo - last use    Sexual activity: Never   Other Topics Concern    Not on file   Social History Narrative    ** Merged History Encounter **          Social Determinants of Health     Financial Resource Strain:     Difficulty of Paying Living Expenses: Not on file   Food Insecurity:     Worried About [address] in the Last Year: Not on file    Danilo of Food in the Last Year: Not on file   Transportation Needs:     Lack of Transportation (Medical): Not on file    Lack of Transportation (Non-Medical): Not on file   Physical Activity:     Days of Exercise per Week: Not on file    Minutes of Exercise per Session: Not on file   Stress:     Feeling of Stress : Not on file   Social Connections:     Frequency of Communication with Friends and Family: Not on file    Frequency of Social Gatherings with Friends and Family: Not on file    Attends Mosque Services: Not on file    Active Member of [address] [place] [address] or Organizations: Not on file    Attends Club or Organization Meetings: Not on file    Marital Status: Not on file   Intimate Partner Violence:     Fear of Current or Ex-Partner: Not on file    Emotionally Abused: Not on file    Physically Abused: Not on file    Sexually Abused: Not on file   Housing Stability:     Unable to Pay for Housing in the Last Year: Not on file    Number of [place] in the Last Year: Not on file    Unstable Housing in the Last Year: Not on file         ALLERGIES: Patient has no known allergies. Review of Systems   Unable to perform ROS: Other (Patient does not want to answer questions)       There were no vitals filed for this visit. Physical Exam  Vitals and nursing note reviewed. HENT:      Head: Normocephalic and atraumatic. Nose: Nose normal.      Mouth/Throat:      Mouth: Mucous membranes are moist.   Eyes:      General: No scleral icterus. Cardiovascular:      Rate and Rhythm: Normal rate. Pulmonary:      Effort: Pulmonary effort is normal.   Abdominal:      General: Abdomen is flat. Skin:     General: Skin is warm. Capillary Refill: Capillary refill takes less than 2 seconds. Neurological:      Mental Status: She is alert and oriented to person, place, and time.       Sensory: No sensory deficit. Motor: Weakness (general, non focal) present. Psychiatric:         Mood and Affect: Mood normal.          MDM      VITAL SIGNS:  Patient Vitals for the past 4 hrs:   Pulse Resp BP SpO2   05/10/22 0455 79 17 (!) 150/83 96 %         LABS:  No results found for this or any previous visit (from the past 6 hour(s)). IMAGING:  CT HEAD WO CONT   Final Result   No acute intracranial process. Medications During Visit:  Medications   hydrALAZINE (APRESOLINE) 20 mg/mL injection 10 mg (10 mg IntraVENous Given 5/10/22 0215)   ketorolac (TORADOL) injection 15 mg (15 mg IntraVENous Given 5/10/22 0321)   diphenhydrAMINE (BENADRYL) injection 25 mg (25 mg IntraVENous Given 5/10/22 0319)   dexamethasone (PF) (DECADRON) 10 mg/mL injection 10 mg (10 mg IntraVENous Given 5/10/22 0320)   prochlorperazine (COMPAZINE) with saline injection 10 mg (10 mg IntraVENous Given 5/10/22 0318)         DECISION MAKING:  Stephanie Cedillo is a 73 y.o. female who comes in as above. Here, patient has resolution of symptoms with medicines as above. Patient discharged home in stable condition. Most likely migraine type headache as she has had this in the past      IMPRESSION:  1. Acute nonintractable headache, unspecified headache type        DISPOSITION:  Discharged      Discharge Medication List as of 5/10/2022  4:50 AM           Follow-up Information     Follow up With Specialties Details Why Contact Addi Huerta, NP Nurse Practitioner Schedule an appointment as soon as possible for a visit   [address]  [phone]              The patient is asked to follow-up with their primary care provider in the next several days. They are to call tomorrow for an appointment. The patient is asked to return promptly for any increased concerns or worsening of symptoms.   They can return to this emergency department or any other emergency department.       Procedures

## 2022-05-17 ENCOUNTER — VIRTUAL VISIT (OUTPATIENT)
Dept: INTERNAL MEDICINE CLINIC | Age: 72
End: 2022-05-17

## 2022-05-17 DIAGNOSIS — I10 ESSENTIAL HYPERTENSION: Primary | ICD-10-CM

## 2022-05-17 RX ORDER — ATORVASTATIN CALCIUM 20 MG/1
20 TABLET, FILM COATED ORAL
Qty: 90 TABLET | Refills: 0 | Status: SHIPPED | OUTPATIENT
Start: 2022-05-17 | End: 2022-05-18 | Stop reason: ALTCHOICE

## 2022-05-17 RX ORDER — DILTIAZEM HYDROCHLORIDE 120 MG/1
120 CAPSULE, COATED, EXTENDED RELEASE ORAL DAILY
Qty: 90 CAPSULE | Refills: 1 | Status: SHIPPED | OUTPATIENT
Start: 2022-05-17

## 2022-05-17 NOTE — PROGRESS NOTES
Call went to voice mail x 3 for virtual visit  Will refill BP meds as BP high in ED and have PSR to schedule her for in person visit asap

## 2022-05-17 NOTE — PROGRESS NOTES
Chief Complaint   Patient presents with    ED Follow-up     [facility], headache- pt states BP was high in ED and she was not rx'd BP meds- does not have cuff at home     Other     phone call [phone]       1. \"Have you been to the ER, urgent care clinic since your last visit? Hospitalized since your last visit? \" Yes When: 5/10/22 Where: [facility] Reason for visit: headache    2. \"Have you seen or consulted any other health care providers outside of the [address] [place] [address] since your last visit? \" No     3. For patients aged 39-70: Has the patient had a colonoscopy / FIT/ Cologuard? No      If the patient is female:    4. For patients aged 41-77: Has the patient had a mammogram within the past 2 years? No      5. For patients aged 21-65: Has the patient had a pap smear?  NA - based on age or sex

## 2022-05-26 ENCOUNTER — TELEPHONE (OUTPATIENT)
Dept: CARDIOLOGY CLINIC | Age: 72
End: 2022-05-26

## 2022-05-26 NOTE — TELEPHONE ENCOUNTER
ID verified per protocol x2.  Patient confirmed for nuclear stress test appointment reminder including date, time, location, prep, and duration of test. Patient verbalized understanding and agrees with prep/test.

## 2022-06-12 NOTE — PROGRESS NOTES
Cardiac Electrophysiology Office VISIT Note     Subjective:      Arthur Multani is a 73 y.o. patient who presents for follow up of Medtronic dual chamber pacemaker (DOI 01/07/2019). Occasional chest discomfort, worse with deep inspiration. Echo in 01/2019 showed LVEF 85% with severe concentric LVH & grade 3 diastolic dysfunction. BP borderline low. She does have occasional lightheadedness. On ASA 81 mg po daily, denies bleeding issues. No OAC due to risk of ICH with history of cocaine use. Previous:  ER visit for headache 03/15/2021. Declined imaging. 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 ER visit for chest pain & syncope 07/04/2019. Onset while seated, gradually worsened, heat compounded issue. Witness saw her clutch her chest, LOC. EMS reported BP 96/36, responded well to NS. Tested + for cocaine & THC. Troponin negative. Medtronic dual chamber pacemaker 01/07/2019 for intermittent 3rd degree AVB & PSVT. Ventricular septum & RV thickness markedly increased. Increased thickness of atrial septum c/w lipomatous hypertrophy. Admitted 01/2019 with chest pain. Drug screen + cocaine & THC. Troponin peaked at 0.3. ECG showed NSR with septal q * ST elevation <1 mm in V1-2. History of short runs AF/AFL. PCI many years ago at [facility], but then followed at [facility] cardiology. Reports subsequent nuclear stress test x 2 without reversible ischemia. Reported CVA 1970. Cocaine abuse. Current daily smoker. Previously followed by Dr. Lexie Oglesby. Problem List  Date Reviewed: 5/17/2022          Codes Class Noted    S/P placement of cardiac pacemaker ICD-10-CM: Z95.0  ICD-9-CM: V45.01  1/10/2019        Cocaine abuse ([facility] ICD-10-CM: F14.10  ICD-9-CM: 305.60  1/6/2019              Current Outpatient Medications   Medication Sig Dispense Refill    rosuvastatin (CRESTOR) 10 mg tablet Take 1 Tablet by mouth nightly.  90 Tablet 1    dilTIAZem ER (CARDIZEM CD) 120 mg capsule Take 1 Capsule by mouth daily. 90 Capsule 1    ARIPiprazole (ABILIFY) 10 mg tablet Take 10 mg by mouth daily.  aspirin 81 mg chewable tablet Take 1 Tab by mouth daily. (Patient taking differently: Take 162 mg by mouth daily.) 30 Tab 0    mirtazapine (REMERON) 15 mg tablet TAKE 1/2 TABLET BY MOUTH EVERY NIGHT FOR INSOMNIA (Patient not taking: Reported on 2022)  2     No Known Allergies  Past Medical History:   Diagnosis Date    Anxiety     CAD (coronary artery disease)     Heart failure ([facility]     Hypertension     Psychiatric disorder     depression    Stroke ([facility]     Stroke ([facility]     1970     Past Surgical History:   Procedure Laterality Date    HX GYN      tubal ligation    HX PACEMAKER PLACEMENT Left 2018    TX CARDIAC SURG PROCEDURE UNLIST      TX INS NEW/RPLCMT PRM PM W/TRANSV ELTRD ATRIAL&VENT  2019            Social History     Tobacco Use    Smoking status: Current Every Day Smoker     Types: Cigarettes    Smokeless tobacco: Never Used    Tobacco comment: 2019   Substance Use Topics    Alcohol use: Never        Review of Systems:   Constitutional: Negative for fever, chills, weight loss, malaise/fatigue. HEENT: Negative for nosebleeds, vision changes. + headaches  Respiratory: Negative for cough, hemoptysis. + occasional pain on inspiration. Cardiovascular: + occasional chest discomfort, occasional palpitations, no orthopnea, claudication, leg swelling, syncope, and no PND. Gastrointestinal: Negative for nausea, vomiting, diarrhea, blood in stool and melena. Genitourinary: Negative for dysuria, and hematuria. Musculoskeletal: Negative for myalgias, arthralgia. Skin: Negative for rash. Heme: Does not bleed or bruise easily. Neurological: Negative for speech change and focal weakness.      Objective:     Visit Vitals  BP 90/68 (BP 1 Location: Left upper arm, BP Patient Position: Sitting, BP Cuff Size: )   Pulse 61   Resp 16   Ht 5' 5\" (1.651 m) Wt 112 lb 6.4 oz (51 kg)   SpO2 100%   BMI 18.70 kg/m²        Physical Exam:   Constitutional: Well-developed and well-nourished. No respiratory distress. Head: Normocephalic and atraumatic. Eyes: Pupils are equal, round. ENT: Hearing grossly normal.  Neck: Supple. No JVD present. Cardiovascular: Normal rate, regular rhythm. Exam reveals no gallop and no friction rub. No murmur heard. Pulmonary/Chest: Effort normal and breath sounds normal. No wheezes. Abdominal: Soft, no tenderness. Musculoskeletal: Moves extremities independently. Normal gait. Vasc/lymphatic: No edema. Neurological: Alert, oriented. Skin: Skin is warm and dry. Left chest pacer site well healed, slightly prominent. Psychiatric: Normal mood and affect. Behavior is normal. Judgment and thought content normal.        Assessment/Plan:     Imaging/Studies:  CTA head & neck in 12/2020 showed moderate to severe chronic microvascular ischemic disease, but no acute intracranial abnormality. 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 Left vertebral artery & intracranial posterior circulation patent. 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 Echo (01/03/2019): LVEF 85%, small LV cavity, severe concentric LVH, grade 3 diastolic dysfunction. Markedly increased thickness of ventricular septum. RV wall thickness. Increased thickness of septum, consistent with lipomatous hypertrophy. Mild MR. Stress echo (06/02/2009): Normal resting LVEF. Post exercise, adequate hyperdynamic response in all myocardial segments. Negative stress test.        ICD-10-CM ICD-9-CM    1. Pacemaker  Z95.0 V45.01    2. PAT (paroxysmal atrial tachycardia) ([facility])  I47.1 427.0    3. PAF (paroxysmal atrial fibrillation) ([facility])  I48.0 427.31    4.  Primary hypertension  I10 401.9        Medtronic dual chamber pacemaker (DOI 01/07/2019): Device check today shows proper lead & generator function. Generator longevity estimated 7 yrs, 3 months. RA 7.73%, RV 0.13%. Since 09/15/2021, device detected 512 \"fast A&V\" & 604 episodes of \"VT\". Presenting EGMs are sinus tach/atrial tach (1:1 conduction). Also 11 AT/AF episodes noted which show AF. Longest episode noted 12/01/2021 lasting 38 min 4 sec. AT/AF burden <0.1%. States [employer] intermittently doesn't work properly; given Medtr[employer] number to troubleshoot. PAF: Episode lasting 29 minutes noted on 12/29/2020, had RVR; additional brief recurrence as noted above. Overall rate is generally controlled. Occasional mild palpitations. States trying to stop cocaine use but has used it somewhat recently; suspect this coincided with cocaine abuse. With cocaine, she is at risk for HTN & CVA. Too high risk for ICH, will not start [address]. Continue ASA 81 mg po daily. Continue diltiazem as previously prescribed. Chest discomfort: Nuclear stress test previously ordered, but not scheduled. She agrees to schedule. HTN: Significant concentric LVH & RVH on last echo. BP borderline low & occasional lightheadedness, but she would like to continue current medication regimen. She dislikes salt, but agrees to drink tomato juice to raise baseline BP a bit. She will call if this worsens. Remote pacer checks q 3 months. Follow up with Dr. Pan Ayala in 1 year. Future Appointments   Date Time Provider Tami Rosado   10/12/2022  3:30 PM Rerosalio Bain CAVBILL BS AMB   1/18/2023  8:15 AM REMOTE1, JAMIL CAVREY BS AMB   4/24/2023  2:15 PM REMOTE1, NEGRON CAVREY BS AMB   7/20/2023 10:20 AM PACEMAKER3, NEGRON CAVREY BS AMB   7/20/2023 10:40 AM MD DEBRA Nixon BS AMB     Thank you for involving me in this patient's care and please call with further concerns or questions.     HAIM Pritchard [address] [facility] [place]  06/23/22

## 2022-06-20 ENCOUNTER — TELEPHONE (OUTPATIENT)
Dept: CARDIOLOGY CLINIC | Age: 72
End: 2022-06-20

## 2022-06-20 NOTE — TELEPHONE ENCOUNTER
\"The voicemail box is not set up\" in order to leave a message with the nuclear stress test appointment reminder. I tried all the other numbers listed and either had no voicemail available/set up or the mailbox was full.

## 2022-06-23 ENCOUNTER — OFFICE VISIT (OUTPATIENT)
Dept: CARDIOLOGY CLINIC | Age: 72
End: 2022-06-23
Payer: MEDICARE

## 2022-06-23 ENCOUNTER — CLINICAL SUPPORT (OUTPATIENT)
Dept: CARDIOLOGY CLINIC | Age: 72
End: 2022-06-23
Payer: MEDICARE

## 2022-06-23 VITALS
RESPIRATION RATE: 16 BRPM | SYSTOLIC BLOOD PRESSURE: 90 MMHG | HEART RATE: 61 BPM | BODY MASS INDEX: 18.73 KG/M2 | HEIGHT: 65 IN | OXYGEN SATURATION: 100 % | WEIGHT: 112.4 LBS | DIASTOLIC BLOOD PRESSURE: 68 MMHG

## 2022-06-23 DIAGNOSIS — I47.1 PAT (PAROXYSMAL ATRIAL TACHYCARDIA) (HCC): ICD-10-CM

## 2022-06-23 DIAGNOSIS — Z95.0 CARDIAC PACEMAKER IN SITU: Primary | ICD-10-CM

## 2022-06-23 DIAGNOSIS — R07.89 CHEST DISCOMFORT: ICD-10-CM

## 2022-06-23 DIAGNOSIS — I10 PRIMARY HYPERTENSION: ICD-10-CM

## 2022-06-23 DIAGNOSIS — I48.0 PAF (PAROXYSMAL ATRIAL FIBRILLATION) (HCC): ICD-10-CM

## 2022-06-23 DIAGNOSIS — Z95.0 PACEMAKER: Primary | ICD-10-CM

## 2022-06-23 PROCEDURE — G8420 CALC BMI NORM PARAMETERS: HCPCS | Performed by: NURSE PRACTITIONER

## 2022-06-23 PROCEDURE — 1123F ACP DISCUSS/DSCN MKR DOCD: CPT | Performed by: NURSE PRACTITIONER

## 2022-06-23 PROCEDURE — G8427 DOCREV CUR MEDS BY ELIG CLIN: HCPCS | Performed by: NURSE PRACTITIONER

## 2022-06-23 PROCEDURE — 1101F PT FALLS ASSESS-DOCD LE1/YR: CPT | Performed by: NURSE PRACTITIONER

## 2022-06-23 PROCEDURE — G0463 HOSPITAL OUTPT CLINIC VISIT: HCPCS | Performed by: NURSE PRACTITIONER

## 2022-06-23 PROCEDURE — G8400 PT W/DXA NO RESULTS DOC: HCPCS | Performed by: NURSE PRACTITIONER

## 2022-06-23 PROCEDURE — 93288 INTERROG EVL PM/LDLS PM IP: CPT | Performed by: INTERNAL MEDICINE

## 2022-06-23 PROCEDURE — 3017F COLORECTAL CA SCREEN DOC REV: CPT | Performed by: NURSE PRACTITIONER

## 2022-06-23 PROCEDURE — 1090F PRES/ABSN URINE INCON ASSESS: CPT | Performed by: NURSE PRACTITIONER

## 2022-06-23 PROCEDURE — G8536 NO DOC ELDER MAL SCRN: HCPCS | Performed by: NURSE PRACTITIONER

## 2022-06-23 PROCEDURE — G8432 DEP SCR NOT DOC, RNG: HCPCS | Performed by: NURSE PRACTITIONER

## 2022-06-23 PROCEDURE — G8754 DIAS BP LESS 90: HCPCS | Performed by: NURSE PRACTITIONER

## 2022-06-23 PROCEDURE — 99214 OFFICE O/P EST MOD 30 MIN: CPT | Performed by: NURSE PRACTITIONER

## 2022-06-23 PROCEDURE — 93280 PM DEVICE PROGR EVAL DUAL: CPT | Performed by: INTERNAL MEDICINE

## 2022-06-23 PROCEDURE — G8752 SYS BP LESS 140: HCPCS | Performed by: NURSE PRACTITIONER

## 2022-06-23 NOTE — PATIENT INSTRUCTIONS
Try drinking tomato juice every 1-2 days or eating canned soup. Call if your dizziness increases in frequency or severity.

## 2022-07-22 NOTE — PROGRESS NOTES
Cardiac Electrophysiology Office VISIT Note     Subjective:      Karyle Socks is a 73 y.o. patient who presents for follow up s/p admission at [facility]. Records have been requested, but not yet received. Patient is a poor historian, states her chest didn't hurt, but that she felt \"down\". States many tests were done, & the only thing that she knows is that her aorta was reported to be enlarged. Aorta appeared normal on chest CTA in 2019. Since then, she reports some ongoing RAZO, no other significant symptoms other than chronic intermittent chest discomfort. Nuclear stress test had been ordered previously, but not received. She is s/p Medtronic dual chamber pacemaker (DOI 01/07/2019). Occasional chest discomfort, worse with deep inspiration. Echo in 01/2019 showed LVEF 85% with severe concentric LVH & grade 3 diastolic dysfunction. BP WNL. On ASA 81 mg po daily, denies bleeding issues. No OAC due to risk of ICH with history of cocaine use. Previous:  ER visit for headache 03/15/2021. Declined imaging. 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 ER visit for chest pain & syncope 07/04/2019. Onset while seated, gradually worsened, heat compounded issue. Witness saw her clutch her chest, LOC. EMS reported BP 96/36, responded well to NS. Tested + for cocaine & THC. Troponin negative. Medtronic dual chamber pacemaker 01/07/2019 for intermittent 3rd degree AVB & PSVT. Ventricular septum & RV thickness markedly increased. Increased thickness of atrial septum c/w lipomatous hypertrophy. Admitted 01/2019 with chest pain. Drug screen + cocaine & THC. Troponin peaked at 0.3. ECG showed NSR with septal q * ST elevation <1 mm in V1-2. History of short runs AF/AFL. PCI many years ago at [facility], but then followed at [facility] cardiology. Reports subsequent nuclear stress test x 2 without reversible ischemia. Reported CVA 1970. Cocaine abuse.     Current daily smoker. Previously followed by Dr. Samuel Marrufo. Problem List  Date Reviewed: 6/23/2022            Codes Class Noted    S/P placement of cardiac pacemaker ICD-10-CM: Z95.0  ICD-9-CM: V45.01  1/10/2019        Cocaine abuse ([facility] ICD-10-CM: F14.10  ICD-9-CM: 305.60  1/6/2019           Current Outpatient Medications   Medication Sig Dispense Refill    rosuvastatin (CRESTOR) 10 mg tablet Take 1 Tablet by mouth nightly. 90 Tablet 1    dilTIAZem ER (CARDIZEM CD) 120 mg capsule Take 1 Capsule by mouth daily. 90 Capsule 1    ARIPiprazole (ABILIFY) 10 mg tablet Take 10 mg by mouth daily. aspirin 81 mg chewable tablet Take 1 Tab by mouth daily. (Patient taking differently: Take 162 mg by mouth in the morning.) 30 Tab 0    mirtazapine (REMERON) 15 mg tablet TAKE 1/2 TABLET BY MOUTH EVERY NIGHT FOR INSOMNIA (Patient not taking: Reported on 7/29/2022)  2     No Known Allergies  Past Medical History:   Diagnosis Date    Anxiety     CAD (coronary artery disease)     Heart failure ([facility]     Hypertension     Psychiatric disorder     depression    Stroke ([facility]     Stroke ([facility]     1970     Past Surgical History:   Procedure Laterality Date    HX GYN      tubal ligation    HX PACEMAKER PLACEMENT Left 2018    [facility] CARDIAC SURG PROCEDURE UNLIST      [facility] INS NEW/RPLCMT PRM PM W/TRANSV ELTRD ATRIAL&VENT  1/7/2019            Social History     Tobacco Use    Smoking status: Every Day     Types: Cigarettes    Smokeless tobacco: Never    Tobacco comments:     12/2019   Substance Use Topics    Alcohol use: Never        Review of Systems:   Constitutional: Negative for fever, chills, weight loss, malaise/fatigue. HEENT: Negative for nosebleeds, vision changes. Respiratory: Negative for cough, hemoptysis. + occasional pain on inspiration. Cardiovascular: + occasional chest discomfort, occasional palpitations, no orthopnea, claudication, leg swelling, syncope, and no PND.    Gastrointestinal: Negative for nausea, vomiting, diarrhea, blood in stool and melena. Genitourinary: Negative for dysuria, and hematuria. Musculoskeletal: Negative for myalgias, arthralgia. Skin: Negative for rash. Heme: Does not bleed or bruise easily. Neurological: Negative for speech change and focal weakness. Objective:     Visit Vitals  /82 (BP 1 Location: Left upper arm, BP Patient Position: Sitting, BP Cuff Size: Adult)   Pulse 60   Resp 18   Ht 5' 5\" (1.651 m)   Wt 109 lb 6.4 oz (49.6 kg)   SpO2 100%   BMI 18.21 kg/m²        Physical Exam:   Constitutional: Well-developed and well-nourished. No respiratory distress. Head: Normocephalic and atraumatic. Eyes: Pupils are equal, round. ENT: Hearing grossly normal.  Neck: Supple. No JVD present. Cardiovascular: Normal rate, regular rhythm. Exam reveals no gallop and no friction rub. No murmur heard. Pulmonary/Chest: Effort normal and breath sounds normal. No wheezes. Abdominal: Soft, no tenderness. Musculoskeletal: Moves extremities independently. Normal gait. Vasc/lymphatic: No edema. Neurological: Alert, oriented. Skin: Skin is warm and dry. Left chest pacer site well healed, slightly prominent. Psychiatric: Normal mood and affect. Behavior is normal. Judgment and thought content normal.        Assessment/Plan:     Imaging/Studies:  CTA head & neck (12/2020): Moderate to severe chronic microvascular ischemic disease, but no acute intracranial abnormality. 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 Left vertebral artery & intracranial posterior circulation patent. 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 Echo (01/03/2019): LVEF 85%, small LV cavity, severe concentric LVH, grade 3 diastolic dysfunction. Markedly increased thickness of ventricular septum. RV wall thickness. Increased thickness of septum, consistent with lipomatous hypertrophy. Mild MR.     Stress echo (06/02/2009): Normal resting LVEF. Post exercise, adequate hyperdynamic response in all myocardial segments. Negative stress test.        ICD-10-CM ICD-9-CM    1. Hospital discharge follow-up  Z09 V67.59       2. Pacemaker  Z95.0 V45.01       3. PAF (paroxysmal atrial fibrillation) (HCC)  I48.0 427.31       4. Chest discomfort  R07.89 786.59       5. Primary hypertension  I10 401.9       6. LVH (left ventricular hypertrophy)  I51.7 429.3       7. RVH (right ventricular hypertrophy)  I51.7 429.3           Hospital discharge follow up: Precipitating events of hospitalization unclear. Will re-request records. She reports possible MRI & that aorta was enlarged (was normal in size in 2019). Will review diagnostics & imaging & determine whether further testing is needed. Will hold off on previously ordered nuclear stress test for now until records from [facility] are reviewed. Medtronic dual chamber pacemaker (DOI 01/07/2019): Device check on 06/23/2022 showed proper lead & generator function. Generator longevity estimated 7.25 yrs. RA 7.73%, RV 0.13%. Since 09/15/2021, device detected 512 \"fast A&V\" & 604 episodes VT. Presenting EGMs are ST/PAT (1:1 conduction). 11 AT/AF episodes noted, show PAF, longest episode 12/01/2021 lasting 38 min 4 seconds. AT/AF burden <0.1%. PAF:  Overall rate is generally controlled. Occasional mild palpitations. States trying to stop cocaine use but has used it somewhat recently; suspect this coincided with cocaine abuse. With cocaine, she is at risk for HTN & CVA. Too high risk for ICH, will not start 934 . Continue ASA 81 mg po daily. Continue diltiazem as previously prescribed. Chest discomfort: Nuclear stress test previously ordered, but not scheduled. She agrees to schedule. HTN: Significant concentric LVH & RVH on last echo. BP controlled, has occasional lightheadedness. Remote pacer checks q 3 months. Follow up with Dr. Geri Gold in 1 year.     Future Appointments   Date Time Provider Tami Rosado   10/12/2022  3:30 PM REMOTE1, JAMIL RICHARDSON BS AMB   1/18/2023  8:15 AM REMOTE1, JAMIL RICHARDSON BS AMB   4/24/2023  2:15 PM REMOTE1, JAMIL MARKHAMREY BS AMB   7/20/2023 10:20 AM PACEMAKER3, JAMIL RICHARDSON BS AMB   7/20/2023 10:40 AM MD DEBRA Dominguez BS AMB     Thank you for involving me in this patient's care and please call with further concerns or questions. Leobardo Merlin, FNP-C [address] [facility] [place]  07/29/22      Addendum  Received records from [address] [place] [address] dated 07/06/2022-07/09/2022. Seen for syncopal episode, occurred after getting out of the car & unloading groceries. Preceded by severe sharp pain in the center of her abdomen. Indeterminate duration of LOC. Found by neighbor. Abdominal pain improved with morphine. Had CT abdomen reportedly concerning for aortic aneurysm & possible aortitis. Discussed with vascular surgery, opted for conservative management with repeat CTA abdomen/pelvis the following day. Hypokalemic (3 on admit), supplemented. Mg WNL. Discharged home, advised to follow up with Dr. Eugenie Lopez. Na 141  K 3.4  BUN 16  Cr 0.74  Glu 66  WBC 6.25  Hgb 10.8  Hct 34  Plt 220     CTA abd/pelvis/chest (07/09/2022): No significant change in 4.1 cm distal abdominal aortic aneurysm with mild retroperitoneal lymph node enlargement. CTA abd/pelvis/chest with & without IV contrast (07/07/2022): Partially thrombosed infrarenal irregular abdominal aortic aneurysm measuring 4.1 cm x 3.1 cm with 1.9 cm patent lumen. Ectatic appearance of distal thoracic & prox abdominal aorta up to 3.2 cm in diameter. Mild circumferential thickening of abdominal aortic wall mostly appears  to be due to wall adherent clot. No significant stranding along abdominal aortic adventitia. Left periaortic lymphadenopathy measuring up to 1.7 cm x 1.2 cm with minimal associated stranding. Bilateral apical scarring in the lung. Centrilobular emphysematous changes. Bibasilar scarring in lung bases, L>R. Evidence of CAD. Chronic scarring in left renal parenchyma as above. Left colonic diverticulosis, possible mild diverticulitis. Uterine fibroids. Chronic left renal cortical scarring. CT abd/pelvis without IV contrast (07/06/2022): Irregular 4.6 cm greatest diameter infrarenal aortic aneurysm. Retroperitoneal US (07/06/2022): Mild aneurysmal dilation of infrarenal aorta & diffuse atherosclerotic changes. No evidence of dissection. Question of whether there was a mural or intramural or periaortic thrombus present. CT head without contrast (07/06/2022): No acute intracranial process. Rather prominent, bilateral periventricular deep white matter disease. ECG (07/06/2022): NSR 97 bpm, occasional PAC.

## 2022-07-29 ENCOUNTER — TELEPHONE (OUTPATIENT)
Dept: CARDIOLOGY CLINIC | Age: 72
End: 2022-07-29

## 2022-07-29 ENCOUNTER — DOCUMENTATION ONLY (OUTPATIENT)
Dept: CARDIOLOGY CLINIC | Age: 72
End: 2022-07-29

## 2022-07-29 ENCOUNTER — OFFICE VISIT (OUTPATIENT)
Dept: CARDIOLOGY CLINIC | Age: 72
End: 2022-07-29
Payer: MEDICARE

## 2022-07-29 VITALS
HEART RATE: 60 BPM | BODY MASS INDEX: 18.23 KG/M2 | WEIGHT: 109.4 LBS | OXYGEN SATURATION: 100 % | HEIGHT: 65 IN | DIASTOLIC BLOOD PRESSURE: 82 MMHG | SYSTOLIC BLOOD PRESSURE: 118 MMHG | RESPIRATION RATE: 18 BRPM

## 2022-07-29 DIAGNOSIS — I51.7 RVH (RIGHT VENTRICULAR HYPERTROPHY): ICD-10-CM

## 2022-07-29 DIAGNOSIS — R07.89 CHEST DISCOMFORT: ICD-10-CM

## 2022-07-29 DIAGNOSIS — I51.7 LVH (LEFT VENTRICULAR HYPERTROPHY): ICD-10-CM

## 2022-07-29 DIAGNOSIS — I48.0 PAF (PAROXYSMAL ATRIAL FIBRILLATION) (HCC): ICD-10-CM

## 2022-07-29 DIAGNOSIS — Z09 HOSPITAL DISCHARGE FOLLOW-UP: Primary | ICD-10-CM

## 2022-07-29 DIAGNOSIS — Z95.0 PACEMAKER: ICD-10-CM

## 2022-07-29 DIAGNOSIS — I10 PRIMARY HYPERTENSION: ICD-10-CM

## 2022-07-29 PROCEDURE — G8752 SYS BP LESS 140: HCPCS | Performed by: NURSE PRACTITIONER

## 2022-07-29 PROCEDURE — G8400 PT W/DXA NO RESULTS DOC: HCPCS | Performed by: NURSE PRACTITIONER

## 2022-07-29 PROCEDURE — 99214 OFFICE O/P EST MOD 30 MIN: CPT | Performed by: NURSE PRACTITIONER

## 2022-07-29 PROCEDURE — 1101F PT FALLS ASSESS-DOCD LE1/YR: CPT | Performed by: NURSE PRACTITIONER

## 2022-07-29 PROCEDURE — G8536 NO DOC ELDER MAL SCRN: HCPCS | Performed by: NURSE PRACTITIONER

## 2022-07-29 PROCEDURE — G8427 DOCREV CUR MEDS BY ELIG CLIN: HCPCS | Performed by: NURSE PRACTITIONER

## 2022-07-29 PROCEDURE — 1123F ACP DISCUSS/DSCN MKR DOCD: CPT | Performed by: NURSE PRACTITIONER

## 2022-07-29 PROCEDURE — G0463 HOSPITAL OUTPT CLINIC VISIT: HCPCS | Performed by: NURSE PRACTITIONER

## 2022-07-29 PROCEDURE — G8432 DEP SCR NOT DOC, RNG: HCPCS | Performed by: NURSE PRACTITIONER

## 2022-07-29 PROCEDURE — G8754 DIAS BP LESS 90: HCPCS | Performed by: NURSE PRACTITIONER

## 2022-07-29 PROCEDURE — 3017F COLORECTAL CA SCREEN DOC REV: CPT | Performed by: NURSE PRACTITIONER

## 2022-07-29 PROCEDURE — 1090F PRES/ABSN URINE INCON ASSESS: CPT | Performed by: NURSE PRACTITIONER

## 2022-07-29 PROCEDURE — G8419 CALC BMI OUT NRM PARAM NOF/U: HCPCS | Performed by: NURSE PRACTITIONER

## 2022-07-29 NOTE — TELEPHONE ENCOUNTER
Received records from [address] [place] [address] dated 07/06/2022-07/09/2022. Seen for syncopal episode, occurred after getting out of the car & unloading groceries. Preceded by severe sharp pain in the center of her abdomen. Indeterminate duration of LOC. Found by neighbor. Abdominal pain improved with morphine. Had CT abdomen reportedly concerning for aortic aneurysm & possible aortitis. Discussed with vascular surgery, opted for conservative management with repeat CTA abdomen/pelvis the following day. Hypokalemic (3 on admit), supplemented. Mg WNL. Discharged home, advised to follow up with Dr. Aneudy Mendoza. Na 141  K 3.4  BUN 16  Cr 0.74  Glu 66  WBC 6.25  Hgb 10.8  Hct 34  Plt 220    CTA abd/pelvis/chest (07/09/2022): No significant change in 4.1 cm distal abdominal aortic aneurysm with mild retroperitoneal lymph node enlargement. CTA abd/pelvis/chest with & without IV contrast (07/07/2022): Partially thrombosed infrarenal irregular abdominal aortic aneurysm measuring 4.1 cm x 3.1 cm with 1.9 cm patent lumen. Ectatic appearance of distal thoracic & prox abdominal aorta up to 3.2 cm in diameter. Mild circumferential thickening of abdominal aortic wall mostly appears  to be due to wall adherent clot. No significant stranding along abdominal aortic adventitia. Left periaortic lymphadenopathy measuring up to 1.7 cm x 1.2 cm with minimal associated stranding. Bilateral apical scarring in the lung. Centrilobular emphysematous changes. Bibasilar scarring in lung bases, L>R. Evidence of CAD. Chronic scarring in left renal parenchyma as above. Left colonic diverticulosis, possible mild diverticulitis. Uterine fibroids. Chronic left renal cortical scarring. CT abd/pelvis without IV contrast (07/06/2022): Irregular 4.6 cm greatest diameter infrarenal aortic aneurysm. Retroperitoneal US (07/06/2022): Mild aneurysmal dilation of infrarenal aorta & diffuse atherosclerotic changes.   No evidence of dissection. Question of whether there was a mural or intramural or periaortic thrombus present. CT head without contrast (07/06/2022): No acute intracranial process. Rather prominent, bilateral periventricular deep white matter disease. ECG (07/06/2022): NSR 97 bpm, occasional PAC.

## 2022-07-29 NOTE — PROGRESS NOTES
Received records from [address] [place] [address] dated 07/06/2022-07/09/2022. Seen for syncopal episode, occurred after getting out of the car & unloading groceries. Preceded by severe sharp pain in the center of her abdomen. Indeterminate duration of LOC. Found by neighbor. Abdominal pain improved with morphine. Had CT abdomen reportedly concerning for aortic aneurysm & possible aortitis. Discussed with vascular surgery, opted for conservative management with repeat CTA abdomen/pelvis the following day. Hypokalemic (3 on admit), supplemented. Mg WNL. Discharged home, advised to follow up with Dr. Brandi Mcdonnell. Na 141  K 3.4  BUN 16  Cr 0.74  Glu 66  WBC 6.25  Hgb 10.8  Hct 34  Plt 220     CTA abd/pelvis/chest (07/09/2022): No significant change in 4.1 cm distal abdominal aortic aneurysm with mild retroperitoneal lymph node enlargement. CTA abd/pelvis/chest with & without IV contrast (07/07/2022): Partially thrombosed infrarenal irregular abdominal aortic aneurysm measuring 4.1 cm x 3.1 cm with 1.9 cm patent lumen. Ectatic appearance of distal thoracic & prox abdominal aorta up to 3.2 cm in diameter. Mild circumferential thickening of abdominal aortic wall mostly appears  to be due to wall adherent clot. No significant stranding along abdominal aortic adventitia. Left periaortic lymphadenopathy measuring up to 1.7 cm x 1.2 cm with minimal associated stranding. Bilateral apical scarring in the lung. Centrilobular emphysematous changes. Bibasilar scarring in lung bases, L>R. Evidence of CAD. Chronic scarring in left renal parenchyma as above. Left colonic diverticulosis, possible mild diverticulitis. Uterine fibroids. Chronic left renal cortical scarring. CT abd/pelvis without IV contrast (07/06/2022): Irregular 4.6 cm greatest diameter infrarenal aortic aneurysm. Retroperitoneal US (07/06/2022): Mild aneurysmal dilation of infrarenal aorta & diffuse atherosclerotic changes.   No evidence of dissection. Question of whether there was a mural or intramural or periaortic thrombus present. CT head without contrast (07/06/2022): No acute intracranial process. Rather prominent, bilateral periventricular deep white matter disease. ECG (07/06/2022): NSR 97 bpm, occasional PAC.

## 2024-01-20 ENCOUNTER — APPOINTMENT (OUTPATIENT)
Facility: HOSPITAL | Age: 74
End: 2024-01-20
Payer: MEDICARE

## 2024-01-20 ENCOUNTER — HOSPITAL ENCOUNTER (EMERGENCY)
Facility: HOSPITAL | Age: 74
Discharge: HOME | End: 2024-01-21
Attending: STUDENT IN AN ORGANIZED HEALTH CARE EDUCATION/TRAINING PROGRAM
Payer: MEDICARE

## 2024-01-20 DIAGNOSIS — R10.84 GENERALIZED ABDOMINAL PAIN: Primary | ICD-10-CM

## 2024-01-20 DIAGNOSIS — I71.40 ABDOMINAL ANEURYSM (HCC): ICD-10-CM

## 2024-01-20 LAB
ALBUMIN SERPL-MCNC: 2.5 G/DL (ref 3.5–5)
ALBUMIN/GLOB SERPL: 0.5 (ref 1.1–2.2)
ALP SERPL-CCNC: 77 U/L (ref 45–117)
ALT SERPL-CCNC: 7 U/L (ref 12–78)
AMPHET UR QL SCN: NEGATIVE
ANION GAP SERPL CALC-SCNC: 7 MMOL/L (ref 5–15)
AST SERPL-CCNC: 14 U/L (ref 15–37)
BARBITURATES UR QL SCN: NEGATIVE
BASOPHILS # BLD: 0 K/UL (ref 0–0.1)
BASOPHILS NFR BLD: 0 % (ref 0–1)
BENZODIAZ UR QL: NEGATIVE
BILIRUB SERPL-MCNC: 0.3 MG/DL (ref 0.2–1)
BUN SERPL-MCNC: 23 MG/DL (ref 6–20)
BUN/CREAT SERPL: 18 (ref 12–20)
CALCIUM SERPL-MCNC: 8.4 MG/DL (ref 8.5–10.1)
CANNABINOIDS UR QL SCN: NEGATIVE
CHLORIDE SERPL-SCNC: 104 MMOL/L (ref 97–108)
CO2 SERPL-SCNC: 28 MMOL/L (ref 21–32)
COCAINE UR QL SCN: POSITIVE
CREAT SERPL-MCNC: 1.27 MG/DL (ref 0.55–1.02)
DIFFERENTIAL METHOD BLD: ABNORMAL
EOSINOPHIL # BLD: 0.1 K/UL (ref 0–0.4)
EOSINOPHIL NFR BLD: 3 % (ref 0–7)
ERYTHROCYTE [DISTWIDTH] IN BLOOD BY AUTOMATED COUNT: 16.2 % (ref 11.5–14.5)
GLOBULIN SER CALC-MCNC: 5 G/DL (ref 2–4)
GLUCOSE SERPL-MCNC: 89 MG/DL (ref 65–100)
HCT VFR BLD AUTO: 31 % (ref 35–47)
HGB BLD-MCNC: 9.6 G/DL (ref 11.5–16)
IMM GRANULOCYTES # BLD AUTO: 0 K/UL (ref 0–0.04)
IMM GRANULOCYTES NFR BLD AUTO: 1 % (ref 0–0.5)
LIPASE SERPL-CCNC: 42 U/L (ref 13–75)
LYMPHOCYTES # BLD: 0.9 K/UL (ref 0.8–3.5)
LYMPHOCYTES NFR BLD: 19 % (ref 12–49)
Lab: ABNORMAL
MCH RBC QN AUTO: 22.1 PG (ref 26–34)
MCHC RBC AUTO-ENTMCNC: 31 G/DL (ref 30–36.5)
MCV RBC AUTO: 71.3 FL (ref 80–99)
METHADONE UR QL: NEGATIVE
MONOCYTES # BLD: 0.5 K/UL (ref 0–1)
MONOCYTES NFR BLD: 11 % (ref 5–13)
NEUTS SEG # BLD: 3.2 K/UL (ref 1.8–8)
NEUTS SEG NFR BLD: 66 % (ref 32–75)
NRBC # BLD: 0 K/UL (ref 0–0.01)
NRBC BLD-RTO: 0 PER 100 WBC
OPIATES UR QL: NEGATIVE
PCP UR QL: NEGATIVE
PLATELET # BLD AUTO: 209 K/UL (ref 150–400)
PMV BLD AUTO: 10.4 FL (ref 8.9–12.9)
POTASSIUM SERPL-SCNC: 3.3 MMOL/L (ref 3.5–5.1)
PROT SERPL-MCNC: 7.5 G/DL (ref 6.4–8.2)
RBC # BLD AUTO: 4.35 M/UL (ref 3.8–5.2)
SODIUM SERPL-SCNC: 139 MMOL/L (ref 136–145)
WBC # BLD AUTO: 4.8 K/UL (ref 3.6–11)

## 2024-01-20 PROCEDURE — 84484 ASSAY OF TROPONIN QUANT: CPT

## 2024-01-20 PROCEDURE — 80053 COMPREHEN METABOLIC PANEL: CPT

## 2024-01-20 PROCEDURE — 99285 EMERGENCY DEPT VISIT HI MDM: CPT

## 2024-01-20 PROCEDURE — 6370000000 HC RX 637 (ALT 250 FOR IP): Performed by: PHYSICIAN ASSISTANT

## 2024-01-20 PROCEDURE — 2580000003 HC RX 258: Performed by: PHYSICIAN ASSISTANT

## 2024-01-20 PROCEDURE — 93005 ELECTROCARDIOGRAM TRACING: CPT | Performed by: STUDENT IN AN ORGANIZED HEALTH CARE EDUCATION/TRAINING PROGRAM

## 2024-01-20 PROCEDURE — 36415 COLL VENOUS BLD VENIPUNCTURE: CPT

## 2024-01-20 PROCEDURE — 93005 ELECTROCARDIOGRAM TRACING: CPT | Performed by: PHYSICIAN ASSISTANT

## 2024-01-20 PROCEDURE — 80307 DRUG TEST PRSMV CHEM ANLYZR: CPT

## 2024-01-20 PROCEDURE — 85025 COMPLETE CBC W/AUTO DIFF WBC: CPT

## 2024-01-20 PROCEDURE — 83690 ASSAY OF LIPASE: CPT

## 2024-01-20 RX ORDER — ASPIRIN 81 MG/1
324 TABLET, CHEWABLE ORAL
Status: COMPLETED | OUTPATIENT
Start: 2024-01-20 | End: 2024-01-20

## 2024-01-20 RX ORDER — 0.9 % SODIUM CHLORIDE 0.9 %
250 INTRAVENOUS SOLUTION INTRAVENOUS ONCE
Status: COMPLETED | OUTPATIENT
Start: 2024-01-20 | End: 2024-01-21

## 2024-01-20 RX ADMIN — ASPIRIN 81 MG 324 MG: 81 TABLET ORAL at 23:45

## 2024-01-20 RX ADMIN — SODIUM CHLORIDE 250 ML: 9 INJECTION, SOLUTION INTRAVENOUS at 23:45

## 2024-01-20 ASSESSMENT — PAIN SCALES - GENERAL: PAINLEVEL_OUTOF10: 10

## 2024-01-20 ASSESSMENT — PAIN DESCRIPTION - LOCATION: LOCATION: ABDOMEN

## 2024-01-20 ASSESSMENT — PAIN - FUNCTIONAL ASSESSMENT: PAIN_FUNCTIONAL_ASSESSMENT: 0-10

## 2024-01-21 ENCOUNTER — APPOINTMENT (OUTPATIENT)
Facility: HOSPITAL | Age: 74
End: 2024-01-21
Payer: MEDICARE

## 2024-01-21 VITALS
HEART RATE: 84 BPM | BODY MASS INDEX: 17.99 KG/M2 | RESPIRATION RATE: 18 BRPM | TEMPERATURE: 97.9 F | DIASTOLIC BLOOD PRESSURE: 89 MMHG | OXYGEN SATURATION: 100 % | SYSTOLIC BLOOD PRESSURE: 142 MMHG | HEIGHT: 65 IN | WEIGHT: 108 LBS

## 2024-01-21 LAB
TROPONIN I SERPL HS-MCNC: 37 NG/L (ref 0–51)
TROPONIN I SERPL HS-MCNC: 38 NG/L (ref 0–51)

## 2024-01-21 PROCEDURE — 6360000004 HC RX CONTRAST MEDICATION: Performed by: PHYSICIAN ASSISTANT

## 2024-01-21 PROCEDURE — 71275 CT ANGIOGRAPHY CHEST: CPT

## 2024-01-21 RX ORDER — PANTOPRAZOLE SODIUM 40 MG/1
40 TABLET, DELAYED RELEASE ORAL
Qty: 30 TABLET | Refills: 5 | Status: SHIPPED | OUTPATIENT
Start: 2024-01-21

## 2024-01-21 RX ADMIN — IOPAMIDOL 100 ML: 755 INJECTION, SOLUTION INTRAVENOUS at 01:08

## 2024-01-21 NOTE — ED TRIAGE NOTES
Patient brought to ED by EMS for RUQ pain. Pt states pain is chronic and she has had it for greater than an a year. Pt reports pain is worse today. Pt reports pain worsened with inspiration.

## 2024-01-21 NOTE — ED PROVIDER NOTES
[facility] EMERGENCY DEPT  EMERGENCY DEPARTMENT ENCOUNTER       Pt Name: Jaz Noble  MRN: [id number]  Birthdate 1950  Date of evaluation: 1/20/2024  Provider: Nacho Curry PA-C   PCP: Patty Burroughs APRN - NP  Note Started: 10:36 PM EST 1/20/24     CHIEF COMPLAINT       Chief Complaint   Patient presents with    Abdominal Pain        HISTORY OF PRESENT ILLNESS: 1 or more elements      History From: Patient  HPI Limitations: Other (pt not a great historian)     Jaz Noble is a 73 y.o. female who presents to the ED with right-sided abdominal pain x 1 year.  She states today symptoms worsen as she could not take it anymore so her boyfriend called EMS to bring her to the ED.  She reports discomfort 10 out of 10 but is requesting something to drink.  She denies any nausea or vomiting but does report urinary frequency and diarrhea.  She states pain makes it difficult for her to take a deep breath.  She reports a history of COPD as well.     Nursing Notes were all reviewed and agreed with or any disagreements were addressed in the HPI.     REVIEW OF SYSTEMS      Review of Systems     Positives and Pertinent negatives as per HPI.    PAST HISTORY     Past Medical History:  Past Medical History:   Diagnosis Date    Anxiety     CAD (coronary artery disease)     Heart failure (HCC)     Hypertension     Psychiatric disorder     depression    Stroke (HCC)     1970    Stroke (HCC)        Past Surgical History:  Past Surgical History:   Procedure Laterality Date    GYN      tubal ligation    INS NEW/RPLCMT PRM PM W/TRANSV ELTRD ATRIAL&VENT  1/7/2019         PACEMAKER PLACEMENT Left 2018    [place] UNLISTED PROCEDURE CARDIAC SURGERY         Family History:  No family history on file.    Social History:  Social History     Tobacco Use    Smoking status: Every Day    Smokeless tobacco: Never   Substance Use Topics    Alcohol use: Never    Drug use: Yes     Types: Cocaine, Marijuana (Weed)       Allergies:  No Known

## 2024-01-22 LAB
EKG ATRIAL RATE: 92 BPM
EKG ATRIAL RATE: 94 BPM
EKG DIAGNOSIS: NORMAL
EKG DIAGNOSIS: NORMAL
EKG P AXIS: 43 DEGREES
EKG P AXIS: 65 DEGREES
EKG P-R INTERVAL: 134 MS
EKG P-R INTERVAL: 134 MS
EKG Q-T INTERVAL: 372 MS
EKG Q-T INTERVAL: 380 MS
EKG QRS DURATION: 82 MS
EKG QRS DURATION: 84 MS
EKG QTC CALCULATION (BAZETT): 460 MS
EKG QTC CALCULATION (BAZETT): 475 MS
EKG R AXIS: -2 DEGREES
EKG R AXIS: 13 DEGREES
EKG T AXIS: 53 DEGREES
EKG T AXIS: 69 DEGREES
EKG VENTRICULAR RATE: 92 BPM
EKG VENTRICULAR RATE: 94 BPM

## 2024-01-22 PROCEDURE — 93010 ELECTROCARDIOGRAM REPORT: CPT | Performed by: SPECIALIST

## 2024-02-22 ENCOUNTER — HOSPITAL ENCOUNTER (EMERGENCY)
Facility: HOSPITAL | Age: 74
Discharge: HOME | End: 2024-02-22
Attending: STUDENT IN AN ORGANIZED HEALTH CARE EDUCATION/TRAINING PROGRAM
Payer: MEDICARE

## 2024-02-22 ENCOUNTER — APPOINTMENT (OUTPATIENT)
Facility: HOSPITAL | Age: 74
End: 2024-02-22
Payer: MEDICARE

## 2024-02-22 VITALS
WEIGHT: 108 LBS | RESPIRATION RATE: 18 BRPM | SYSTOLIC BLOOD PRESSURE: 116 MMHG | HEART RATE: 87 BPM | DIASTOLIC BLOOD PRESSURE: 82 MMHG | BODY MASS INDEX: 17.99 KG/M2 | TEMPERATURE: 98.3 F | HEIGHT: 65 IN | OXYGEN SATURATION: 100 %

## 2024-02-22 DIAGNOSIS — R10.84 GENERALIZED ABDOMINAL PAIN: ICD-10-CM

## 2024-02-22 DIAGNOSIS — K59.00 CONSTIPATION, UNSPECIFIED CONSTIPATION TYPE: Primary | ICD-10-CM

## 2024-02-22 DIAGNOSIS — I73.9 PERIPHERAL VASCULAR DISEASE (HCC): ICD-10-CM

## 2024-02-22 LAB
ALBUMIN SERPL-MCNC: 3.2 G/DL (ref 3.5–5)
ALBUMIN/GLOB SERPL: 0.6 (ref 1.1–2.2)
ALP SERPL-CCNC: 91 U/L (ref 45–117)
ALT SERPL-CCNC: 10 U/L (ref 12–78)
ANION GAP SERPL CALC-SCNC: 10 MMOL/L (ref 5–15)
AST SERPL-CCNC: 23 U/L (ref 15–37)
BASOPHILS # BLD: 0 K/UL (ref 0–0.1)
BASOPHILS NFR BLD: 1 % (ref 0–1)
BILIRUB SERPL-MCNC: 0.6 MG/DL (ref 0.2–1)
BUN SERPL-MCNC: 15 MG/DL (ref 6–20)
BUN/CREAT SERPL: 13 (ref 12–20)
CALCIUM SERPL-MCNC: 9.2 MG/DL (ref 8.5–10.1)
CHLORIDE SERPL-SCNC: 102 MMOL/L (ref 97–108)
CO2 SERPL-SCNC: 25 MMOL/L (ref 21–32)
CREAT SERPL-MCNC: 1.2 MG/DL (ref 0.55–1.02)
DIFFERENTIAL METHOD BLD: ABNORMAL
EOSINOPHIL # BLD: 0.1 K/UL (ref 0–0.4)
EOSINOPHIL NFR BLD: 2 % (ref 0–7)
ERYTHROCYTE [DISTWIDTH] IN BLOOD BY AUTOMATED COUNT: 17 % (ref 11.5–14.5)
GLOBULIN SER CALC-MCNC: 5.2 G/DL (ref 2–4)
GLUCOSE SERPL-MCNC: 82 MG/DL (ref 65–100)
HCT VFR BLD AUTO: 32.9 % (ref 35–47)
HGB BLD-MCNC: 10.3 G/DL (ref 11.5–16)
IMM GRANULOCYTES # BLD AUTO: 0 K/UL (ref 0–0.04)
IMM GRANULOCYTES NFR BLD AUTO: 0 % (ref 0–0.5)
LIPASE SERPL-CCNC: 22 U/L (ref 13–75)
LYMPHOCYTES # BLD: 0.8 K/UL (ref 0.8–3.5)
LYMPHOCYTES NFR BLD: 21 % (ref 12–49)
MCH RBC QN AUTO: 21.8 PG (ref 26–34)
MCHC RBC AUTO-ENTMCNC: 31.3 G/DL (ref 30–36.5)
MCV RBC AUTO: 69.6 FL (ref 80–99)
MONOCYTES # BLD: 0.4 K/UL (ref 0–1)
MONOCYTES NFR BLD: 10 % (ref 5–13)
NEUTS SEG # BLD: 2.7 K/UL (ref 1.8–8)
NEUTS SEG NFR BLD: 66 % (ref 32–75)
NRBC # BLD: 0 K/UL (ref 0–0.01)
NRBC BLD-RTO: 0 PER 100 WBC
PLATELET # BLD AUTO: 133 K/UL (ref 150–400)
POTASSIUM SERPL-SCNC: 4 MMOL/L (ref 3.5–5.1)
PROT SERPL-MCNC: 8.4 G/DL (ref 6.4–8.2)
RBC # BLD AUTO: 4.73 M/UL (ref 3.8–5.2)
RBC MORPH BLD: ABNORMAL
RBC MORPH BLD: ABNORMAL
SODIUM SERPL-SCNC: 137 MMOL/L (ref 136–145)
WBC # BLD AUTO: 4 K/UL (ref 3.6–11)

## 2024-02-22 PROCEDURE — 6370000000 HC RX 637 (ALT 250 FOR IP): Performed by: STUDENT IN AN ORGANIZED HEALTH CARE EDUCATION/TRAINING PROGRAM

## 2024-02-22 PROCEDURE — 80053 COMPREHEN METABOLIC PANEL: CPT

## 2024-02-22 PROCEDURE — 99284 EMERGENCY DEPT VISIT MOD MDM: CPT

## 2024-02-22 PROCEDURE — 85025 COMPLETE CBC W/AUTO DIFF WBC: CPT

## 2024-02-22 PROCEDURE — 6360000002 HC RX W HCPCS: Performed by: STUDENT IN AN ORGANIZED HEALTH CARE EDUCATION/TRAINING PROGRAM

## 2024-02-22 PROCEDURE — 83690 ASSAY OF LIPASE: CPT

## 2024-02-22 PROCEDURE — 96374 THER/PROPH/DIAG INJ IV PUSH: CPT

## 2024-02-22 RX ORDER — SODIUM PHOSPHATE, DIBASIC AND SODIUM PHOSPHATE, MONOBASIC 3.5; 9.5 G/66ML; G/66ML
1 ENEMA RECTAL ONCE
Status: COMPLETED | OUTPATIENT
Start: 2024-02-22 | End: 2024-02-22

## 2024-02-22 RX ORDER — DICYCLOMINE HYDROCHLORIDE 10 MG/ML
20 INJECTION INTRAMUSCULAR ONCE
Status: DISCONTINUED | OUTPATIENT
Start: 2024-02-22 | End: 2024-02-22 | Stop reason: HOSPADM

## 2024-02-22 RX ORDER — KETOROLAC TROMETHAMINE 30 MG/ML
15 INJECTION, SOLUTION INTRAMUSCULAR; INTRAVENOUS
Status: COMPLETED | OUTPATIENT
Start: 2024-02-22 | End: 2024-02-22

## 2024-02-22 RX ADMIN — KETOROLAC TROMETHAMINE 15 MG: 30 INJECTION, SOLUTION INTRAMUSCULAR; INTRAVENOUS at 04:34

## 2024-02-22 RX ADMIN — SODIUM PHOSPHATE, DIBASIC AND SODIUM PHOSPHATE, MONOBASIC 1 ENEMA: 3.5; 9.5 ENEMA RECTAL at 02:32

## 2024-02-22 ASSESSMENT — PAIN SCALES - GENERAL: PAINLEVEL_OUTOF10: 10

## 2024-02-22 ASSESSMENT — PAIN - FUNCTIONAL ASSESSMENT: PAIN_FUNCTIONAL_ASSESSMENT: 0-10

## 2024-02-22 ASSESSMENT — PAIN DESCRIPTION - LOCATION: LOCATION: ABDOMEN

## 2024-02-22 ASSESSMENT — PAIN DESCRIPTION - ORIENTATION: ORIENTATION: LEFT

## 2024-02-22 ASSESSMENT — PAIN DESCRIPTION - PAIN TYPE: TYPE: ACUTE PAIN

## 2024-02-22 ASSESSMENT — PAIN DESCRIPTION - DESCRIPTORS: DESCRIPTORS: ACHING

## 2024-02-22 NOTE — ED PROVIDER NOTES
Patient is refusing CT scan [JS]   0520 Patient now saying her legs hurt, but abdominal pain is better.  She thinks her stents are closed and need to come out.  Obtaining dopplers. [JS]   0542 Pulses are dopplerable per nursing notes.  OK for discharge.   [JS]      ED Course User Index  [JS] Derrick Lacy MD         FINAL IMPRESSION     1. Constipation, unspecified constipation type    2. Generalized abdominal pain    3. Peripheral vascular disease (HCC)          DISPOSITION/PLAN   Jaz Noble's  results have been reviewed with her.  She has been counseled regarding her diagnosis, treatment, and plan.  She verbally conveys understanding and agreement of the signs, symptoms, diagnosis, treatment and prognosis and additionally agrees to follow up as discussed.  She also agrees with the care-plan and conveys that all of her questions have been answered.  I have also provided discharge instructions for her that include: educational information regarding their diagnosis and treatment, and list of reasons why they would want to return to the ED prior to their follow-up appointment, should her condition change.     CLINICAL IMPRESSION    Discharge Note: The patient is stable for discharge home. The signs, symptoms, diagnosis, and discharge instructions have been discussed, understanding conveyed, and agreed upon. The patient is to follow up as recommended or return to ER should their symptoms worsen.      PATIENT REFERRED TO:  [facility] EMERGENCY DEPT  [address] [facility] [address]  [phone]    If symptoms worsen    Moshe Agustin MD  [address] [facility] [address]  [facility] [id number] [phone]    Call       Patty Burroughs, APRN - NP  [address]  [address]  [facility] [id number] [phone]    In 1 week         DISCHARGE MEDICATIONS:     Medication List        ASK your doctor about these medications      ARIPiprazole 10 MG tablet  Commonly known as: ABILIFY     aspirin 81 MG chewable tablet

## 2024-02-22 NOTE — ED TRIAGE NOTES
Pt arrived via EMS c/o intermittent left sided abdominal pain for a couple weeks. Pt recently seen at [facility] for similar s/s and received an enema 2/20/24 for constipation. Pt reports decreased appetite. Blood sugar for ems was 99.

## 2024-02-22 NOTE — ED TRIAGE NOTES
Pt arrived via EMS after ingesting fentanyl and cocaine - c/o heart racing and A/V hallucinations. Pt actively vomiting.

## 2024-02-28 ENCOUNTER — HOSPITAL ENCOUNTER (EMERGENCY)
Facility: HOSPITAL | Age: 74
Discharge: HOME | End: 2024-02-28
Attending: EMERGENCY MEDICINE
Payer: MEDICARE

## 2024-02-28 ENCOUNTER — APPOINTMENT (OUTPATIENT)
Facility: HOSPITAL | Age: 74
End: 2024-02-28
Payer: MEDICARE

## 2024-02-28 VITALS
TEMPERATURE: 98.7 F | DIASTOLIC BLOOD PRESSURE: 84 MMHG | SYSTOLIC BLOOD PRESSURE: 142 MMHG | RESPIRATION RATE: 23 BRPM | BODY MASS INDEX: 17.99 KG/M2 | HEIGHT: 65 IN | HEART RATE: 79 BPM | OXYGEN SATURATION: 100 % | WEIGHT: 108 LBS

## 2024-02-28 DIAGNOSIS — K29.00 ACUTE GASTRITIS WITHOUT HEMORRHAGE, UNSPECIFIED GASTRITIS TYPE: Primary | ICD-10-CM

## 2024-02-28 DIAGNOSIS — I71.43 INFRARENAL ABDOMINAL AORTIC ANEURYSM (AAA) WITHOUT RUPTURE (HCC): ICD-10-CM

## 2024-02-28 DIAGNOSIS — R79.89 ELEVATED TROPONIN: ICD-10-CM

## 2024-02-28 LAB
ALBUMIN SERPL-MCNC: 3.1 G/DL (ref 3.5–5)
ALBUMIN/GLOB SERPL: 0.6 (ref 1.1–2.2)
ALP SERPL-CCNC: 97 U/L (ref 45–117)
ALT SERPL-CCNC: 8 U/L (ref 12–78)
AMPHET UR QL SCN: NEGATIVE
ANION GAP SERPL CALC-SCNC: 11 MMOL/L (ref 5–15)
AST SERPL-CCNC: 15 U/L (ref 15–37)
BARBITURATES UR QL SCN: NEGATIVE
BASOPHILS # BLD: 0 K/UL (ref 0–0.1)
BASOPHILS NFR BLD: 1 % (ref 0–1)
BENZODIAZ UR QL: NEGATIVE
BILIRUB SERPL-MCNC: 0.5 MG/DL (ref 0.2–1)
BUN SERPL-MCNC: 17 MG/DL (ref 6–20)
BUN/CREAT SERPL: 18 (ref 12–20)
CALCIUM SERPL-MCNC: 9.6 MG/DL (ref 8.5–10.1)
CANNABINOIDS UR QL SCN: NEGATIVE
CHLORIDE SERPL-SCNC: 104 MMOL/L (ref 97–108)
CO2 SERPL-SCNC: 25 MMOL/L (ref 21–32)
COCAINE UR QL SCN: POSITIVE
CREAT SERPL-MCNC: 0.93 MG/DL (ref 0.55–1.02)
DIFFERENTIAL METHOD BLD: ABNORMAL
EOSINOPHIL # BLD: 0.2 K/UL (ref 0–0.4)
EOSINOPHIL NFR BLD: 4 % (ref 0–7)
ERYTHROCYTE [DISTWIDTH] IN BLOOD BY AUTOMATED COUNT: 16.8 % (ref 11.5–14.5)
GLOBULIN SER CALC-MCNC: 5.2 G/DL (ref 2–4)
GLUCOSE SERPL-MCNC: 105 MG/DL (ref 65–100)
HCT VFR BLD AUTO: 34.1 % (ref 35–47)
HGB BLD-MCNC: 10.6 G/DL (ref 11.5–16)
IMM GRANULOCYTES # BLD AUTO: 0 K/UL (ref 0–0.04)
IMM GRANULOCYTES NFR BLD AUTO: 1 % (ref 0–0.5)
LIPASE SERPL-CCNC: 21 U/L (ref 13–75)
LYMPHOCYTES # BLD: 0.8 K/UL (ref 0.8–3.5)
LYMPHOCYTES NFR BLD: 21 % (ref 12–49)
Lab: ABNORMAL
MCH RBC QN AUTO: 21.9 PG (ref 26–34)
MCHC RBC AUTO-ENTMCNC: 31.1 G/DL (ref 30–36.5)
MCV RBC AUTO: 70.5 FL (ref 80–99)
METHADONE UR QL: NEGATIVE
MONOCYTES # BLD: 0.3 K/UL (ref 0–1)
MONOCYTES NFR BLD: 9 % (ref 5–13)
NEUTS SEG # BLD: 2.4 K/UL (ref 1.8–8)
NEUTS SEG NFR BLD: 65 % (ref 32–75)
NRBC # BLD: 0 K/UL (ref 0–0.01)
NRBC BLD-RTO: 0 PER 100 WBC
OPIATES UR QL: NEGATIVE
PCP UR QL: NEGATIVE
PLATELET # BLD AUTO: 131 K/UL (ref 150–400)
POTASSIUM SERPL-SCNC: 3.8 MMOL/L (ref 3.5–5.1)
PROT SERPL-MCNC: 8.3 G/DL (ref 6.4–8.2)
RBC # BLD AUTO: 4.84 M/UL (ref 3.8–5.2)
SODIUM SERPL-SCNC: 140 MMOL/L (ref 136–145)
TROPONIN I SERPL HS-MCNC: 56 NG/L (ref 0–51)
TROPONIN I SERPL HS-MCNC: 61 NG/L (ref 0–51)
WBC # BLD AUTO: 3.8 K/UL (ref 3.6–11)

## 2024-02-28 PROCEDURE — 85025 COMPLETE CBC W/AUTO DIFF WBC: CPT

## 2024-02-28 PROCEDURE — 74177 CT ABD & PELVIS W/CONTRAST: CPT

## 2024-02-28 PROCEDURE — 6370000000 HC RX 637 (ALT 250 FOR IP): Performed by: EMERGENCY MEDICINE

## 2024-02-28 PROCEDURE — 93005 ELECTROCARDIOGRAM TRACING: CPT | Performed by: EMERGENCY MEDICINE

## 2024-02-28 PROCEDURE — 6360000002 HC RX W HCPCS: Performed by: EMERGENCY MEDICINE

## 2024-02-28 PROCEDURE — 80307 DRUG TEST PRSMV CHEM ANLYZR: CPT

## 2024-02-28 PROCEDURE — 83690 ASSAY OF LIPASE: CPT

## 2024-02-28 PROCEDURE — 36415 COLL VENOUS BLD VENIPUNCTURE: CPT

## 2024-02-28 PROCEDURE — 80053 COMPREHEN METABOLIC PANEL: CPT

## 2024-02-28 PROCEDURE — 99285 EMERGENCY DEPT VISIT HI MDM: CPT

## 2024-02-28 PROCEDURE — 6360000004 HC RX CONTRAST MEDICATION: Performed by: EMERGENCY MEDICINE

## 2024-02-28 PROCEDURE — 84484 ASSAY OF TROPONIN QUANT: CPT

## 2024-02-28 PROCEDURE — 96374 THER/PROPH/DIAG INJ IV PUSH: CPT

## 2024-02-28 RX ORDER — KETOROLAC TROMETHAMINE 30 MG/ML
15 INJECTION, SOLUTION INTRAMUSCULAR; INTRAVENOUS
Status: COMPLETED | OUTPATIENT
Start: 2024-02-28 | End: 2024-02-28

## 2024-02-28 RX ORDER — PANTOPRAZOLE SODIUM 40 MG/1
40 TABLET, DELAYED RELEASE ORAL
Qty: 30 TABLET | Refills: 5 | Status: SHIPPED | OUTPATIENT
Start: 2024-02-28

## 2024-02-28 RX ADMIN — IOPAMIDOL 100 ML: 755 INJECTION, SOLUTION INTRAVENOUS at 10:35

## 2024-02-28 RX ADMIN — ALUMINUM HYDROXIDE, MAGNESIUM HYDROXIDE, AND SIMETHICONE 40 ML: 1200; 120; 1200 SUSPENSION ORAL at 09:05

## 2024-02-28 RX ADMIN — KETOROLAC TROMETHAMINE 15 MG: 30 INJECTION, SOLUTION INTRAMUSCULAR; INTRAVENOUS at 09:05

## 2024-02-28 ASSESSMENT — PAIN DESCRIPTION - LOCATION
LOCATION: ABDOMEN
LOCATION: ABDOMEN

## 2024-02-28 ASSESSMENT — PAIN SCALES - GENERAL
PAINLEVEL_OUTOF10: 2
PAINLEVEL_OUTOF10: 10

## 2024-02-28 ASSESSMENT — PAIN - FUNCTIONAL ASSESSMENT: PAIN_FUNCTIONAL_ASSESSMENT: 0-10

## 2024-02-28 NOTE — CARE COORDINATION
CM reviewing pt chart for frequent ED visits; will meet w/ pt shortly to ask pt about living situation, support system, potential resource needs.     CM met with pt (accompanied by [facility] volunteer); introduced self and role. CM confirmed pt's contact info, preferred pharmacy, insurance info, and emergency contact (Daughter, Ellen Roa).     Pt has limited understanding of her current diagnoses or medical history. Pt lives alone but is visited by her boyfriend regularly. She prepares her own food; when asked about other ADLs, pt states \"I take care of myself.\" Uses cane and walker at home.     Her daughter helps her with medical appts as needed. Pt unsure if listed PCP is current, thinks she has an office visit in March. Asked CM to verify for her. CM asked pt if she has any specialists at [facility] or any other facilities. Pt said, \"No I don't think so, I don't really know.\" She states that she has a pacemaker, but doesn't have a cardiologist. CM told pt she will speak w/ attending provider about any follow up recommendations and help her with appts, if needed.     Pt states that she has not used cocaine \"in a while\" and doesn't remember when she used it last. Pt unsure of medications she is taking. Stated that she is tired of being in the hospital back to back and being in constant pain.     Per pt's notes from [facility] on 2/13/24, pt has a  with [facility] who transports her to medical appts. CM will follow up w/ pt to ask if she wants  to be included in any discharge planning today.     Pt has appts with [facility] and a Vascular Surgeon in March. Dates and info to be added to her AVS:    Date Type Department Care Team Description   03/07/2024 1:20 PM EST Office Visit [facility] Cardiology   [facility]   [address], [address]th [place] [address] [phone]  Nikki Vargas, MCKAYLA   [address] [address] [phone] (Work)

## 2024-02-28 NOTE — ED PROVIDER NOTES
[facility] EMERGENCY DEPT  EMERGENCY DEPARTMENT ENCOUNTER       Pt Name: Jaz Noble  MRN: [id number]  Birthdate 1950  Date of evaluation: 2/28/2024  Provider: Luis Eduardo Lui MD   PCP: Patty Burroughs APRN - NP  Note Started: 12:40 PM 2/28/24     CHIEF COMPLAINT       Chief Complaint   Patient presents with    Abdominal Pain        HISTORY OF PRESENT ILLNESS: 1 or more elements      History From: Patient, History limited by: Poor historian     Jaz Noble is a 73 y.o. female who presents with left-sided abdominal pain.  She reports since this morning she has had left-sided abdominal pain.  Denies nausea or vomiting.  Reports a long history of constipation, last bowel movement was 2 days ago.  Reports this pain is similar to the chronic pain that she has from constipation.  Does not take any daily medications for constipation.  Is unable to tell me medications that she is prescribed.     Nursing Notes were all reviewed and agreed with or any disagreements were addressed in the HPI.     REVIEW OF SYSTEMS        Positives and Pertinent negatives as per HPI.    PAST HISTORY     Past Medical History:  Past Medical History:   Diagnosis Date    Anxiety     CAD (coronary artery disease)     Heart failure (HCC)     History of abdominal aortic aneurysm (AAA)     Hypertension     Psychiatric disorder     depression    Stroke (HCC)     1970    Stroke (HCC)        Past Surgical History:  Past Surgical History:   Procedure Laterality Date    GYN      tubal ligation    INS NEW/RPLCMT PRM PM W/TRANSV ELTRD ATRIAL&VENT  1/7/2019         PACEMAKER PLACEMENT Left 2018    [place] UNLISTED PROCEDURE CARDIAC SURGERY         Family History:  History reviewed. No pertinent family history.    Social History:  Social History     Tobacco Use    Smoking status: Every Day    Smokeless tobacco: Never   Substance Use Topics    Alcohol use: Never    Drug use: Yes     Types: Cocaine, Marijuana (Weed)       Allergies:  No Known

## 2024-02-28 NOTE — ED NOTES
Pt presents to ED via EMS complaining of abdominal pain since this morning. Pt denies N/V/D or fever. EMS states pts BS was 52 and they gave oral glucose. Pt refused blood sugar check during RN assessment. Pt is alert and oriented x 4, RR even and unlabored, skin is warm and dry. Pt appears in NAD at this time. Assessment completed and pt updated on plan of care.  Call bell in reach.  Emergency Department Nursing Plan of Care  The Nursing Plan of Care is developed from the Nursing assessment and Emergency Department Attending provider initial evaluation.  The plan of care may be reviewed in the “ED Provider note”.  The Plan of Care was developed with the following considerations:  Patient / Family readiness to learn indicated by:Refer to Medical chart in [facility]  Persons(s) to be included in education: Refer to Medical chart in [facility]  Barriers to Learning/Limitations:Normal

## 2024-02-28 NOTE — ED NOTES
Prior to arrival, patient received oral glucose due to blood glucose level of 52 mg/dL. This RN attempted to obtain POCT glucose upon arrival. RN educated patient about need to check blood glucose levels after receiving oral glucose. Pt continued to refuse blood glucose check.

## 2024-02-29 LAB
EKG ATRIAL RATE: 79 BPM
EKG ATRIAL RATE: 82 BPM
EKG DIAGNOSIS: NORMAL
EKG DIAGNOSIS: NORMAL
EKG P AXIS: 71 DEGREES
EKG P AXIS: 72 DEGREES
EKG P-R INTERVAL: 138 MS
EKG P-R INTERVAL: 146 MS
EKG Q-T INTERVAL: 374 MS
EKG Q-T INTERVAL: 376 MS
EKG QRS DURATION: 82 MS
EKG QRS DURATION: 88 MS
EKG QTC CALCULATION (BAZETT): 431 MS
EKG QTC CALCULATION (BAZETT): 436 MS
EKG R AXIS: 18 DEGREES
EKG R AXIS: 22 DEGREES
EKG T AXIS: 66 DEGREES
EKG T AXIS: 76 DEGREES
EKG VENTRICULAR RATE: 79 BPM
EKG VENTRICULAR RATE: 82 BPM

## 2024-02-29 PROCEDURE — 93010 ELECTROCARDIOGRAM REPORT: CPT | Performed by: SPECIALIST

## 2024-03-08 ENCOUNTER — TELEPHONE (OUTPATIENT)
Age: 74
End: 2024-03-08

## 2024-03-11 ENCOUNTER — HOSPITAL ENCOUNTER (EMERGENCY)
Facility: HOSPITAL | Age: 74
Discharge: HOME | End: 2024-03-11
Attending: EMERGENCY MEDICINE
Payer: MEDICARE

## 2024-03-11 ENCOUNTER — APPOINTMENT (OUTPATIENT)
Facility: HOSPITAL | Age: 74
End: 2024-03-11
Payer: MEDICARE

## 2024-03-11 VITALS
HEART RATE: 75 BPM | SYSTOLIC BLOOD PRESSURE: 130 MMHG | HEIGHT: 65 IN | WEIGHT: 108 LBS | TEMPERATURE: 98.6 F | DIASTOLIC BLOOD PRESSURE: 87 MMHG | OXYGEN SATURATION: 100 % | RESPIRATION RATE: 20 BRPM | BODY MASS INDEX: 17.99 KG/M2

## 2024-03-11 DIAGNOSIS — I16.0 HYPERTENSIVE URGENCY: ICD-10-CM

## 2024-03-11 DIAGNOSIS — Z86.79 HISTORY OF ABDOMINAL AORTIC ANEURYSM (AAA): ICD-10-CM

## 2024-03-11 DIAGNOSIS — K59.00 CONSTIPATION, UNSPECIFIED CONSTIPATION TYPE: Primary | ICD-10-CM

## 2024-03-11 DIAGNOSIS — Z72.0 TOBACCO ABUSE: ICD-10-CM

## 2024-03-11 DIAGNOSIS — Z91.148 NONCOMPLIANCE WITH MEDICATION REGIMEN: ICD-10-CM

## 2024-03-11 PROCEDURE — 6370000000 HC RX 637 (ALT 250 FOR IP): Performed by: EMERGENCY MEDICINE

## 2024-03-11 PROCEDURE — 74019 RADEX ABDOMEN 2 VIEWS: CPT

## 2024-03-11 PROCEDURE — 99283 EMERGENCY DEPT VISIT LOW MDM: CPT

## 2024-03-11 RX ORDER — DICYCLOMINE HYDROCHLORIDE 10 MG/1
20 CAPSULE ORAL
Status: COMPLETED | OUTPATIENT
Start: 2024-03-11 | End: 2024-03-11

## 2024-03-11 RX ORDER — MINERAL OIL 100 G/100G
1 OIL RECTAL PRN
Status: DISCONTINUED | OUTPATIENT
Start: 2024-03-11 | End: 2024-03-11 | Stop reason: HOSPADM

## 2024-03-11 RX ORDER — POLYETHYLENE GLYCOL 3350 17 G/17G
17 POWDER, FOR SOLUTION ORAL ONCE
Status: COMPLETED | OUTPATIENT
Start: 2024-03-11 | End: 2024-03-11

## 2024-03-11 RX ORDER — POLYETHYLENE GLYCOL 3350 17 G/17G
17 POWDER, FOR SOLUTION ORAL DAILY PRN
Qty: 510 G | Refills: 0 | Status: SHIPPED | OUTPATIENT
Start: 2024-03-11 | End: 2024-04-10

## 2024-03-11 RX ADMIN — DICYCLOMINE HYDROCHLORIDE 20 MG: 10 CAPSULE ORAL at 05:07

## 2024-03-11 RX ADMIN — POLYETHYLENE GLYCOL 3350 17 G: 17 POWDER, FOR SOLUTION ORAL at 05:07

## 2024-03-11 RX ADMIN — Medication 1 ENEMA: at 04:58

## 2024-03-11 ASSESSMENT — ENCOUNTER SYMPTOMS
NAUSEA: 0
SORE THROAT: 0
SHORTNESS OF BREATH: 0
COUGH: 0
ABDOMINAL PAIN: 0
CONSTIPATION: 1
DIARRHEA: 0
RHINORRHEA: 0
VOMITING: 0
EYE PAIN: 0

## 2024-03-11 ASSESSMENT — PAIN - FUNCTIONAL ASSESSMENT: PAIN_FUNCTIONAL_ASSESSMENT: 0-10

## 2024-03-11 ASSESSMENT — PAIN SCALES - GENERAL
PAINLEVEL_OUTOF10: 10
PAINLEVEL_OUTOF10: 10

## 2024-03-11 NOTE — DISCHARGE INSTRUCTIONS
interactions with other medications, please call your primary care physician or contact the ER to speak with the charge nurse.     Please make an appointment with your family doctor or the physician you were referred to for follow-up of this visit as instructed on your discharge paperwork. Return to the ER if you are unable to be seen or if you are unable to be seen in a timely manner.    If you have any problem arranging the follow-up visit, contact the Emergency Department immediately.

## 2024-03-11 NOTE — ED PROVIDER NOTES
Anxiety     CAD (coronary artery disease)     Heart failure (HCC)     History of abdominal aortic aneurysm (AAA)     Hypertension     Psychiatric disorder     depression    Stroke (HCC)     1970    Stroke (HCC)        Review of Prior Records and External Documents: See below in ED Course section.    Independent History:   An independent clinically history was obtained.  EMS states vital signs stable, blood sugar normal for them.    Social Determinants of Health Affecting Dx or Tx:  None  Social Determinants of Health with Concerns     Tobacco Use: High Risk (2/28/2024)    Patient History     Smoking Tobacco Use: Every Day     Smokeless Tobacco Use: Never     Passive Exposure: Not on file   Alcohol Use: Not on file   Financial Resource Strain: Not on file   Food Insecurity: Not on file   Transportation Needs: Not on file   Physical Activity: Not on file   Stress: Not on file   Social Connections: Not on file   Intimate Partner Violence: Not on file   Housing Stability: Not on file   Interpersonal Safety: Not on file   Utilities: Not on file     Social History     Tobacco Use    Smoking status: Every Day    Smokeless tobacco: Never   Substance Use Topics    Alcohol use: Never    Drug use: Yes     Types: Cocaine, Marijuana (Weed)     TOBACCO COUNSELING:  Upon evaluation, pt expressed that they are a current tobacco user. For approximately 5 mins, pt has been counseled on the dangers of smoking and was encouraged to quit as soon as possible in order to decrease further risks to their health. Pt has conveyed their understanding of the risks involved should they continue to use tobacco products.    Drug Abuse Cessation: Assessment and Intervention  Patient was assessed for drug abuse and addiction using the DAST-10 screening tool and determined to be moderate risk.    Discussed the risks of drug abuse and the long term sequelae of cocaine use with the patient such as increased risk of depression, respiratory failure,

## 2024-03-11 NOTE — ED NOTES
Pt d/c with instructions and ambulatory. Pt given rx x2 and has no additional questions at this time

## 2024-03-11 NOTE — ED TRIAGE NOTES
Pt brought to ED by EMS for ABD pain and constipation. Pt states she has not had a BM for 3 days. She complains of right sided ABD pain. Pt denies N/V/D. Pt is a+ox4, but is reluctant to answer questions.

## 2024-03-15 ENCOUNTER — TELEPHONE (OUTPATIENT)
Age: 74
End: 2024-03-15

## 2024-03-15 NOTE — TELEPHONE ENCOUNTER
Received labs dated 02/20/2024.    WBC 4.2  Hgb 9.3  Plt 168  Na 142  K 3.5  Cr 0.83  BUN 17  INR 1.4  Hgb A1c 5.7

## 2024-06-10 ENCOUNTER — APPOINTMENT (OUTPATIENT)
Facility: HOSPITAL | Age: 74
End: 2024-06-10
Payer: MEDICARE

## 2024-06-10 ENCOUNTER — HOSPITAL ENCOUNTER (EMERGENCY)
Facility: HOSPITAL | Age: 74
Discharge: HOME | End: 2024-06-11
Payer: MEDICARE

## 2024-06-10 VITALS
SYSTOLIC BLOOD PRESSURE: 164 MMHG | WEIGHT: 105.2 LBS | HEIGHT: 62 IN | TEMPERATURE: 98.1 F | RESPIRATION RATE: 16 BRPM | HEART RATE: 71 BPM | OXYGEN SATURATION: 100 % | BODY MASS INDEX: 19.36 KG/M2 | DIASTOLIC BLOOD PRESSURE: 79 MMHG

## 2024-06-10 DIAGNOSIS — Z91.148 NONCOMPLIANCE WITH MEDICATIONS: ICD-10-CM

## 2024-06-10 DIAGNOSIS — I71.40 ABDOMINAL AORTIC ANEURYSM (AAA) WITHOUT RUPTURE, UNSPECIFIED PART (HCC): ICD-10-CM

## 2024-06-10 DIAGNOSIS — R10.31 ABDOMINAL PAIN, RIGHT LOWER QUADRANT: Primary | ICD-10-CM

## 2024-06-10 LAB
ALBUMIN SERPL-MCNC: 2.7 G/DL (ref 3.5–5)
ALBUMIN/GLOB SERPL: 0.6 (ref 1.1–2.2)
ALP SERPL-CCNC: 92 U/L (ref 45–117)
ALT SERPL-CCNC: <6 U/L (ref 12–78)
ANION GAP SERPL CALC-SCNC: 7 MMOL/L (ref 5–15)
APPEARANCE UR: CLEAR
AST SERPL-CCNC: 17 U/L (ref 15–37)
BACTERIA URNS QL MICRO: NEGATIVE /HPF
BASOPHILS # BLD: 0 K/UL (ref 0–0.1)
BASOPHILS NFR BLD: 0 % (ref 0–1)
BILIRUB SERPL-MCNC: 0.1 MG/DL (ref 0.2–1)
BILIRUB UR QL: NEGATIVE
BUN SERPL-MCNC: 17 MG/DL (ref 6–20)
BUN/CREAT SERPL: 24 (ref 12–20)
CALCIUM SERPL-MCNC: 8.6 MG/DL (ref 8.5–10.1)
CHLORIDE SERPL-SCNC: 108 MMOL/L (ref 97–108)
CO2 SERPL-SCNC: 23 MMOL/L (ref 21–32)
COLOR UR: NORMAL
CREAT SERPL-MCNC: 0.71 MG/DL (ref 0.55–1.02)
DIFFERENTIAL METHOD BLD: ABNORMAL
EOSINOPHIL # BLD: 0.3 K/UL (ref 0–0.4)
EOSINOPHIL NFR BLD: 8 % (ref 0–7)
EPITH CASTS URNS QL MICRO: NORMAL /LPF
ERYTHROCYTE [DISTWIDTH] IN BLOOD BY AUTOMATED COUNT: 15.6 % (ref 11.5–14.5)
GLOBULIN SER CALC-MCNC: 4.4 G/DL (ref 2–4)
GLUCOSE SERPL-MCNC: 82 MG/DL (ref 65–100)
GLUCOSE UR STRIP.AUTO-MCNC: NEGATIVE MG/DL
HCT VFR BLD AUTO: 28.5 % (ref 35–47)
HGB BLD-MCNC: 9.1 G/DL (ref 11.5–16)
HGB UR QL STRIP: NEGATIVE
IMM GRANULOCYTES # BLD AUTO: 0 K/UL
IMM GRANULOCYTES NFR BLD AUTO: 0 %
KETONES UR QL STRIP.AUTO: NEGATIVE MG/DL
LEUKOCYTE ESTERASE UR QL STRIP.AUTO: NEGATIVE
LIPASE SERPL-CCNC: 43 U/L (ref 13–75)
LYMPHOCYTES # BLD: 1.2 K/UL (ref 0.8–3.5)
LYMPHOCYTES NFR BLD: 33 % (ref 12–49)
MCH RBC QN AUTO: 23 PG (ref 26–34)
MCHC RBC AUTO-ENTMCNC: 31.9 G/DL (ref 30–36.5)
MCV RBC AUTO: 72 FL (ref 80–99)
MONOCYTES # BLD: 0.5 K/UL (ref 0–1)
MONOCYTES NFR BLD: 13 % (ref 5–13)
NEUTS SEG # BLD: 1.6 K/UL (ref 1.8–8)
NEUTS SEG NFR BLD: 46 % (ref 32–75)
NITRITE UR QL STRIP.AUTO: NEGATIVE
NRBC # BLD: 0 K/UL (ref 0–0.01)
NRBC BLD-RTO: 0 PER 100 WBC
PH UR STRIP: 6 (ref 5–8)
PLATELET # BLD AUTO: 139 K/UL (ref 150–400)
POTASSIUM SERPL-SCNC: 3.7 MMOL/L (ref 3.5–5.1)
PROT SERPL-MCNC: 7.1 G/DL (ref 6.4–8.2)
PROT UR STRIP-MCNC: NEGATIVE MG/DL
RBC # BLD AUTO: 3.96 M/UL (ref 3.8–5.2)
RBC #/AREA URNS HPF: NORMAL /HPF (ref 0–5)
RBC MORPH BLD: ABNORMAL
SODIUM SERPL-SCNC: 138 MMOL/L (ref 136–145)
SP GR UR REFRACTOMETRY: 1.01 (ref 1–1.03)
URINE CULTURE IF INDICATED: NORMAL
UROBILINOGEN UR QL STRIP.AUTO: 1 EU/DL (ref 0.2–1)
WBC # BLD AUTO: 3.6 K/UL (ref 3.6–11)
WBC URNS QL MICRO: NORMAL /HPF (ref 0–4)

## 2024-06-10 PROCEDURE — 96374 THER/PROPH/DIAG INJ IV PUSH: CPT

## 2024-06-10 PROCEDURE — 36415 COLL VENOUS BLD VENIPUNCTURE: CPT

## 2024-06-10 PROCEDURE — 6360000004 HC RX CONTRAST MEDICATION

## 2024-06-10 PROCEDURE — 83690 ASSAY OF LIPASE: CPT

## 2024-06-10 PROCEDURE — 74174 CTA ABD&PLVS W/CONTRAST: CPT

## 2024-06-10 PROCEDURE — 99285 EMERGENCY DEPT VISIT HI MDM: CPT

## 2024-06-10 PROCEDURE — 81001 URINALYSIS AUTO W/SCOPE: CPT

## 2024-06-10 PROCEDURE — 85025 COMPLETE CBC W/AUTO DIFF WBC: CPT

## 2024-06-10 PROCEDURE — 6360000002 HC RX W HCPCS

## 2024-06-10 PROCEDURE — 80053 COMPREHEN METABOLIC PANEL: CPT

## 2024-06-10 PROCEDURE — 6370000000 HC RX 637 (ALT 250 FOR IP)

## 2024-06-10 RX ORDER — ACETAMINOPHEN 500 MG
1000 TABLET ORAL
Status: COMPLETED | OUTPATIENT
Start: 2024-06-10 | End: 2024-06-10

## 2024-06-10 RX ORDER — KETOROLAC TROMETHAMINE 30 MG/ML
15 INJECTION, SOLUTION INTRAMUSCULAR; INTRAVENOUS
Status: COMPLETED | OUTPATIENT
Start: 2024-06-10 | End: 2024-06-10

## 2024-06-10 RX ADMIN — IOPAMIDOL 100 ML: 755 INJECTION, SOLUTION INTRAVENOUS at 21:49

## 2024-06-10 RX ADMIN — KETOROLAC TROMETHAMINE 15 MG: 30 INJECTION, SOLUTION INTRAMUSCULAR at 21:19

## 2024-06-10 RX ADMIN — ACETAMINOPHEN 1000 MG: 500 TABLET ORAL at 21:18

## 2024-06-10 ASSESSMENT — PAIN SCALES - GENERAL: PAINLEVEL_OUTOF10: 10

## 2024-06-10 ASSESSMENT — PAIN DESCRIPTION - DESCRIPTORS: DESCRIPTORS: SHARP

## 2024-06-10 ASSESSMENT — PAIN DESCRIPTION - ORIENTATION: ORIENTATION: RIGHT;LOWER

## 2024-06-10 ASSESSMENT — PAIN DESCRIPTION - PAIN TYPE: TYPE: ACUTE PAIN

## 2024-06-10 ASSESSMENT — PAIN DESCRIPTION - LOCATION: LOCATION: ABDOMEN

## 2024-06-10 ASSESSMENT — LIFESTYLE VARIABLES
HOW MANY STANDARD DRINKS CONTAINING ALCOHOL DO YOU HAVE ON A TYPICAL DAY: PATIENT DOES NOT DRINK
HOW OFTEN DO YOU HAVE A DRINK CONTAINING ALCOHOL: NEVER

## 2024-06-10 ASSESSMENT — PAIN - FUNCTIONAL ASSESSMENT: PAIN_FUNCTIONAL_ASSESSMENT: 0-10

## 2024-06-11 NOTE — ED TRIAGE NOTES
Pt brought in via EMS due to stomach pain that started 10min prior to them getting there.  Pt pain is in the RLQ  No changes in urinary status, bowel movement, or appetite   Pt denies NVD  Pt does have a pacemaker   Pt states having an abdominal aortic aneurysm  History of HTN and COPD   Pt is not med complaint   Pt states she does cocaine last time yesterday

## 2024-06-11 NOTE — ED NOTES
Discharge instructions were given to the patient by Stefan.     The patient left the Emergency Department alert and oriented and in no acute distress with 0 prescriptions. The patient was encouraged to call or return to the ED for worsening issues or problems and was encouraged to schedule a follow up appointment for continuing care.     Ambulation assessment completed before discharge.  Pt left Emergency Department ambulating at baseline with no ortho devices  Ortho device education: none    The patient verbalized understanding of discharge instructions and prescriptions, all questions were answered. The patient has no further concerns at this time.

## 2024-06-11 NOTE — ED PROVIDER NOTES
pneumothorax. The distal esophagus is unremarkable. Abdomen/Pelvis: Noncirrhotic liver morphology. Normal gallbladder. Multiple areas of bilateral renal cortical scarring, with hypoenhancement multiple areas of the right renal cortex. There are no focal abnormalities within the  spleen, pancreas, adrenal glands Stomach, duodenum, and small bowel are unremarkable. Mild colonic diverticulosis. The colon is otherwise unremarkable. Normal appendix Nonspecific prominent left para-aortic lymph nodes. The largest is a conglomerate measuring 2.0 x 1.3 cm. These are unchanged compared to February 28. Fibroid uterus.     1. Normal appendix. 2. Descending thoracic aortic aneurysm, and multifocal abdominal aortic aneurysm, overall aorta is unchanged compared to February 2024. 2. 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 3. Ostial stenosis of the bilateral renal arteries, with areas of right-sided cortical hypoenhancement which probably reflect delayed perfusion due to atherosclerotic disease. 4. 1.0 cm aneurysm of the left distal CFA. Stenotic left SFA, unchanged. 5. Occluded right SFA, unchanged. 6. Stenosis at the distal margin of the left common iliac artery stent. 7. Stable prominent left para-aortic lymph nodes. Electronically signed by NATI HUSTON      PROCEDURES   Unless otherwise noted below, none  Procedures     CRITICAL CARE TIME   Patient does not meet Critical Care Time, 0 minutes     EMERGENCY DEPARTMENT COURSE and DIFFERENTIAL DIAGNOSIS/MDM   Vitals:    Vitals:    06/10/24 2020 06/10/24 2130 06/10/24 2141   BP: (!) 142/74 (!) 164/79    Pulse: 71     Resp: 16     Temp: 98.1 °F (36.7 °C)     TempSrc: Oral     SpO2: 100% 100% 100%   Weight: 47.7 kg (105 lb 3.2 oz)     Height: 1.575 m (5' 2\")          Patient was given the following medications:  Medications   ketorolac (TORADOL) injection 15 mg (15 mg IntraVENous Given 6/10/24 2119)   acetaminophen (TYLENOL) tablet

## 2024-07-07 ENCOUNTER — APPOINTMENT (OUTPATIENT)
Facility: HOSPITAL | Age: 74
End: 2024-07-07
Payer: MEDICARE

## 2024-07-07 ENCOUNTER — HOSPITAL ENCOUNTER (EMERGENCY)
Facility: HOSPITAL | Age: 74
Discharge: HOME | End: 2024-07-07
Attending: EMERGENCY MEDICINE
Payer: MEDICARE

## 2024-07-07 VITALS
WEIGHT: 98.8 LBS | BODY MASS INDEX: 18.18 KG/M2 | HEIGHT: 62 IN | OXYGEN SATURATION: 99 % | SYSTOLIC BLOOD PRESSURE: 122 MMHG | TEMPERATURE: 99 F | DIASTOLIC BLOOD PRESSURE: 72 MMHG | HEART RATE: 67 BPM | RESPIRATION RATE: 18 BRPM

## 2024-07-07 DIAGNOSIS — Z98.890 HISTORY OF AAA (ABDOMINAL AORTIC ANEURYSM) REPAIR: ICD-10-CM

## 2024-07-07 DIAGNOSIS — Z09 NEED FOR CASE MANAGEMENT FOLLOW-UP: ICD-10-CM

## 2024-07-07 DIAGNOSIS — Z72.0 TOBACCO ABUSE: ICD-10-CM

## 2024-07-07 DIAGNOSIS — K59.00 CONSTIPATION, UNSPECIFIED CONSTIPATION TYPE: Primary | ICD-10-CM

## 2024-07-07 DIAGNOSIS — F14.10 COCAINE ABUSE (HCC): ICD-10-CM

## 2024-07-07 DIAGNOSIS — Z91.148 NONCOMPLIANCE WITH MEDICATIONS: ICD-10-CM

## 2024-07-07 PROCEDURE — 99283 EMERGENCY DEPT VISIT LOW MDM: CPT

## 2024-07-07 PROCEDURE — 6370000000 HC RX 637 (ALT 250 FOR IP): Performed by: EMERGENCY MEDICINE

## 2024-07-07 PROCEDURE — 6360000002 HC RX W HCPCS: Performed by: EMERGENCY MEDICINE

## 2024-07-07 PROCEDURE — 74019 RADEX ABDOMEN 2 VIEWS: CPT

## 2024-07-07 RX ORDER — DICYCLOMINE HYDROCHLORIDE 10 MG/1
20 CAPSULE ORAL
Status: COMPLETED | OUTPATIENT
Start: 2024-07-07 | End: 2024-07-07

## 2024-07-07 RX ORDER — LIDOCAINE HYDROCHLORIDE 20 MG/ML
JELLY TOPICAL ONCE
Status: COMPLETED | OUTPATIENT
Start: 2024-07-07 | End: 2024-07-07

## 2024-07-07 RX ORDER — MINERAL OIL 100 G/100G
1 OIL RECTAL PRN
Status: DISCONTINUED | OUTPATIENT
Start: 2024-07-07 | End: 2024-07-07 | Stop reason: HOSPADM

## 2024-07-07 RX ORDER — DICYCLOMINE HYDROCHLORIDE 10 MG/ML
20 INJECTION INTRAMUSCULAR ONCE
Status: DISCONTINUED | OUTPATIENT
Start: 2024-07-07 | End: 2024-07-07

## 2024-07-07 RX ORDER — LIDOCAINE HYDROCHLORIDE 20 MG/ML
SOLUTION OROPHARYNGEAL
Status: DISCONTINUED
Start: 2024-07-07 | End: 2024-07-07 | Stop reason: WASHOUT

## 2024-07-07 RX ORDER — POLYETHYLENE GLYCOL 3350 17 G/17G
17 POWDER, FOR SOLUTION ORAL DAILY PRN
Qty: 510 G | Refills: 0 | Status: SHIPPED | OUTPATIENT
Start: 2024-07-07 | End: 2024-08-06

## 2024-07-07 RX ORDER — FAMOTIDINE 20 MG
TABLET ORAL
Qty: 133 ML | Refills: 0 | Status: SHIPPED | OUTPATIENT
Start: 2024-07-07

## 2024-07-07 RX ORDER — POLYETHYLENE GLYCOL 3350 17 G/17G
17 POWDER, FOR SOLUTION ORAL ONCE
Status: COMPLETED | OUTPATIENT
Start: 2024-07-07 | End: 2024-07-07

## 2024-07-07 RX ORDER — DICYCLOMINE HYDROCHLORIDE 10 MG/5ML
20 SOLUTION ORAL
Qty: 200 ML | Refills: 0 | Status: SHIPPED | OUTPATIENT
Start: 2024-07-07

## 2024-07-07 RX ADMIN — MAGNESIUM CITRATE 296 ML: 1.75 LIQUID ORAL at 04:12

## 2024-07-07 RX ADMIN — Medication 1 ENEMA: at 04:12

## 2024-07-07 RX ADMIN — POLYETHYLENE GLYCOL 3350 17 G: 17 POWDER, FOR SOLUTION ORAL at 04:12

## 2024-07-07 RX ADMIN — LIDOCAINE HYDROCHLORIDE: 20 JELLY TOPICAL at 04:12

## 2024-07-07 RX ADMIN — DICYCLOMINE HYDROCHLORIDE 20 MG: 10 CAPSULE ORAL at 05:45

## 2024-07-07 ASSESSMENT — PAIN DESCRIPTION - LOCATION: LOCATION: ABDOMEN

## 2024-07-07 ASSESSMENT — PAIN SCALES - GENERAL: PAINLEVEL_OUTOF10: 10

## 2024-07-07 ASSESSMENT — PAIN DESCRIPTION - DESCRIPTORS: DESCRIPTORS: ACHING

## 2024-07-07 ASSESSMENT — PAIN - FUNCTIONAL ASSESSMENT: PAIN_FUNCTIONAL_ASSESSMENT: 0-10

## 2024-07-07 ASSESSMENT — PAIN DESCRIPTION - ORIENTATION: ORIENTATION: MID

## 2024-07-07 NOTE — DISCHARGE INSTRUCTIONS
contact the ER to speak with the charge nurse.     Please make an appointment with your family doctor or the physician you were referred to for follow-up of this visit as instructed on your discharge paperwork. Return to the ER if you are unable to be seen or if you are unable to be seen in a timely manner.    If you have any problem arranging the follow-up visit, contact the Emergency Department immediately.

## 2024-07-07 NOTE — ED NOTES
Pt has tried to have a bowel movement on the bedside commode and unsuccessful.  Pt crying stating nothing is coming and her abdominal pain has started again.

## 2024-07-07 NOTE — ED NOTES
Verbal shift change report given to Waldo (oncoming nurse) by Won ANAYA (offgoing nurse). Report included the following information Nurse Handoff Report, Index, ED Encounter Summary, ED SBAR, Adult Overview, Intake/Output, MAR, Recent Results, Neuro Assessment, and Event Log.

## 2024-07-07 NOTE — ED PROVIDER NOTES
symptoms after ED treatment. I have reviewed her vital signs and determined there is currently no worsening in their condition or physical exam. Results have been reviewed with them and their questions have been answered. I will continue to review further results as they come available.     ED Course:  ED Course as of 07/07/24 0545   Sun Jul 07, 2024   0332 I did review [facility] records, patient is 73-year-old female, noted history of chronic smoking, cocaine use, combined aneurysmal atherosclerotic disease, last movement yesterday presenting with abdominal pain.  Patient was seen by vascular surgery July 21 thousand 24 for known TAA and occluded CYNTHIA.  Per records, it is chronically occluded noncontributory.  Patient has had most recent CT scan that showed her aneurysm measures the same and multiple stones.  Per vascular surgery, no acute vascular intervention.  Patient was advised to continue good bowel regimen.  The aneurysm has not grown in 4 months. [HW]   0333 Reviewed CTA imaging from June 21, 2024:      Contrast material: OMNI 350; Contrast volume: 100 ml; Contrast route:  INTRAVENOUS (IV);    COMPARISON:  CT ANGIOGRAM ABDOMEN PELVIS W CONTRAST 02/20/24 10:58 AM    FINDINGS:  Aorta: Infrarenal abdominal aortic aneurysm unchanged from comparison  02/20/2024.  Celiac trunk and mesenteric arteries: Severe celiac stenosis. Replaced right  hepatic artery to the SMA. Occluded CYNTHIA.  Renal arteries: Bilateral ostial significant stenosis.  Right iliac arteries: Previous stent. No occlusion or significant stenosis.  Left iliac arteries: Previous stent. No occlusion or significant stenosis.    Liver: No mass.  Gallbladder and biliary ducts: Unremarkable. No calcified stones. No ductal  dilation.  Pancreas: Unremarkable. No mass. No ductal dilation.  Spleen: Unremarkable. No splenomegaly.  Adrenal glands: Unremarkable. No mass.  Kidneys and ureters: Left renal cortical scarring.  Stomach and bowel: Unremarkable. No

## 2024-07-07 NOTE — ED TRIAGE NOTES
Patient to ED from home via EMS for c/o sudden onset abdominal pain that started while she was sleeping. Patient moaning during triage.

## 2024-07-08 ENCOUNTER — FOLLOWUP TELEPHONE ENCOUNTER (OUTPATIENT)
Facility: HOSPITAL | Age: 74
End: 2024-07-08

## 2024-07-08 NOTE — TELEPHONE ENCOUNTER
CM received consult from ER (left in after hours folder 7/7) to assist pt w/ her medication refill. CM attempted to reach pt by phone today, but she did not answer and her vm is not set up. CM will attempt a 2nd call tomorrow.     Kyra Maloney, CHRISSY, CM

## 2024-07-15 ENCOUNTER — FOLLOWUP TELEPHONE ENCOUNTER (OUTPATIENT)
Facility: HOSPITAL | Age: 74
End: 2024-07-15

## 2024-07-15 NOTE — TELEPHONE ENCOUNTER
CM attempted to follow up w/ pt after her ER visit on 7/7. CM had recieced an after hours consult requesting assistance w/ medication refills. CM called pt's listed home number ([phone]) but the voicemail has not been set up yet. CM called listed mobile ([phone]) and the pt's , Kiley, answered, from the [facility].  confirmed pt's DB and stated that she was unaware pt had any issues w/ medication refills. She plans to see pt today and will check in w/ her about this. If the issue is just a ride to the [facility] pharmacy, she stated that she can being her to pick them up early this week.     Kyra Maloney, CHRISSY, CM

## 2024-08-08 ENCOUNTER — HOSPITAL ENCOUNTER (EMERGENCY)
Facility: HOSPITAL | Age: 74
Discharge: HOME | End: 2024-08-08
Attending: STUDENT IN AN ORGANIZED HEALTH CARE EDUCATION/TRAINING PROGRAM
Payer: MEDICARE

## 2024-08-08 ENCOUNTER — APPOINTMENT (OUTPATIENT)
Facility: HOSPITAL | Age: 74
End: 2024-08-08
Payer: MEDICARE

## 2024-08-08 VITALS
BODY MASS INDEX: 17 KG/M2 | RESPIRATION RATE: 20 BRPM | DIASTOLIC BLOOD PRESSURE: 78 MMHG | OXYGEN SATURATION: 98 % | HEART RATE: 82 BPM | WEIGHT: 102 LBS | SYSTOLIC BLOOD PRESSURE: 126 MMHG | HEIGHT: 65 IN | TEMPERATURE: 98 F

## 2024-08-08 DIAGNOSIS — F14.10 COCAINE ABUSE (HCC): ICD-10-CM

## 2024-08-08 DIAGNOSIS — R10.10 PAIN OF UPPER ABDOMEN: Primary | ICD-10-CM

## 2024-08-08 LAB
ALBUMIN SERPL-MCNC: 3.2 G/DL (ref 3.5–5)
ALBUMIN/GLOB SERPL: 0.6 (ref 1.1–2.2)
ALP SERPL-CCNC: 92 U/L (ref 45–117)
ALT SERPL-CCNC: 12 U/L (ref 12–78)
ANION GAP BLD CALC-SCNC: 11 (ref 10–20)
ANION GAP SERPL CALC-SCNC: 6 MMOL/L (ref 5–15)
APPEARANCE UR: CLEAR
AST SERPL-CCNC: 10 U/L (ref 15–37)
BACTERIA URNS QL MICRO: NEGATIVE /HPF
BASE EXCESS BLD CALC-SCNC: 0.5 MMOL/L
BASOPHILS # BLD: 0 K/UL (ref 0–0.1)
BASOPHILS NFR BLD: 0 % (ref 0–1)
BILIRUB SERPL-MCNC: 0.5 MG/DL (ref 0.2–1)
BILIRUB UR QL: NEGATIVE
BUN SERPL-MCNC: 15 MG/DL (ref 6–20)
BUN/CREAT SERPL: 17 (ref 12–20)
CA-I BLD-MCNC: 1.15 MMOL/L (ref 1.12–1.32)
CALCIUM SERPL-MCNC: 10 MG/DL (ref 8.5–10.1)
CHLORIDE BLD-SCNC: 109 MMOL/L (ref 100–108)
CHLORIDE SERPL-SCNC: 110 MMOL/L (ref 97–108)
CO2 BLD-SCNC: 23 MMOL/L (ref 19–24)
CO2 SERPL-SCNC: 24 MMOL/L (ref 21–32)
COLOR UR: NORMAL
COMMENT:: NORMAL
CREAT SERPL-MCNC: 0.88 MG/DL (ref 0.55–1.02)
CREAT UR-MCNC: 0.7 MG/DL (ref 0.6–1.3)
DIFFERENTIAL METHOD BLD: ABNORMAL
EKG ATRIAL RATE: 91 BPM
EKG DIAGNOSIS: NORMAL
EKG P AXIS: 86 DEGREES
EKG P-R INTERVAL: 134 MS
EKG Q-T INTERVAL: 370 MS
EKG QRS DURATION: 78 MS
EKG QTC CALCULATION (BAZETT): 455 MS
EKG R AXIS: 61 DEGREES
EKG T AXIS: 74 DEGREES
EKG VENTRICULAR RATE: 91 BPM
EOSINOPHIL # BLD: 0.1 K/UL (ref 0–0.4)
EOSINOPHIL NFR BLD: 2 % (ref 0–7)
EPITH CASTS URNS QL MICRO: NORMAL /LPF
ERYTHROCYTE [DISTWIDTH] IN BLOOD BY AUTOMATED COUNT: 16.3 % (ref 11.5–14.5)
GLOBULIN SER CALC-MCNC: 5.4 G/DL (ref 2–4)
GLUCOSE BLD STRIP.AUTO-MCNC: 91 MG/DL (ref 74–99)
GLUCOSE SERPL-MCNC: 94 MG/DL (ref 65–100)
GLUCOSE UR STRIP.AUTO-MCNC: NEGATIVE MG/DL
HCO3 BLDA-SCNC: 24 MMOL/L
HCT VFR BLD AUTO: 31.5 % (ref 35–47)
HGB BLD-MCNC: 10.3 G/DL (ref 11.5–16)
HGB UR QL STRIP: NEGATIVE
IMM GRANULOCYTES # BLD AUTO: 0 K/UL (ref 0–0.04)
IMM GRANULOCYTES NFR BLD AUTO: 1 % (ref 0–0.5)
KETONES UR QL STRIP.AUTO: NEGATIVE MG/DL
LACTATE BLD-SCNC: 0.93 MMOL/L (ref 0.4–2)
LEUKOCYTE ESTERASE UR QL STRIP.AUTO: NEGATIVE
LYMPHOCYTES # BLD: 1.1 K/UL (ref 0.8–3.5)
LYMPHOCYTES NFR BLD: 25 % (ref 12–49)
MCH RBC QN AUTO: 22.4 PG (ref 26–34)
MCHC RBC AUTO-ENTMCNC: 32.7 G/DL (ref 30–36.5)
MCV RBC AUTO: 68.5 FL (ref 80–99)
MONOCYTES # BLD: 0.4 K/UL (ref 0–1)
MONOCYTES NFR BLD: 9 % (ref 5–13)
NEUTS SEG # BLD: 2.7 K/UL (ref 1.8–8)
NEUTS SEG NFR BLD: 63 % (ref 32–75)
NITRITE UR QL STRIP.AUTO: NEGATIVE
NRBC # BLD: 0 K/UL (ref 0–0.01)
NRBC BLD-RTO: 0 PER 100 WBC
PCO2 BLDV: 31.7 MMHG (ref 41–51)
PH BLDV: 7.48 (ref 7.32–7.42)
PH UR STRIP: 7.5 (ref 5–8)
PLATELET # BLD AUTO: 167 K/UL (ref 150–400)
PO2 BLDV: 34 MMHG (ref 25–40)
POTASSIUM BLD-SCNC: 5.2 MMOL/L (ref 3.5–5.5)
POTASSIUM SERPL-SCNC: 3.7 MMOL/L (ref 3.5–5.1)
PROT SERPL-MCNC: 8.6 G/DL (ref 6.4–8.2)
PROT UR STRIP-MCNC: NEGATIVE MG/DL
RBC # BLD AUTO: 4.6 M/UL (ref 3.8–5.2)
RBC #/AREA URNS HPF: NORMAL /HPF (ref 0–5)
RBC MORPH BLD: ABNORMAL
SAO2 % BLD: 72 %
SODIUM BLD-SCNC: 143 MMOL/L (ref 136–145)
SODIUM SERPL-SCNC: 140 MMOL/L (ref 136–145)
SP GR UR REFRACTOMETRY: <1.005 (ref 1–1.03)
SPECIMEN HOLD: NORMAL
SPECIMEN SITE: ABNORMAL
TROPONIN I SERPL HS-MCNC: 28 NG/L (ref 0–51)
TROPONIN I SERPL HS-MCNC: 29 NG/L (ref 0–51)
URINE CULTURE IF INDICATED: NORMAL
UROBILINOGEN UR QL STRIP.AUTO: 1 EU/DL (ref 0.2–1)
WBC # BLD AUTO: 4.3 K/UL (ref 3.6–11)
WBC URNS QL MICRO: NORMAL /HPF (ref 0–4)

## 2024-08-08 PROCEDURE — 84295 ASSAY OF SERUM SODIUM: CPT

## 2024-08-08 PROCEDURE — 85025 COMPLETE CBC W/AUTO DIFF WBC: CPT

## 2024-08-08 PROCEDURE — 82330 ASSAY OF CALCIUM: CPT

## 2024-08-08 PROCEDURE — 84132 ASSAY OF SERUM POTASSIUM: CPT

## 2024-08-08 PROCEDURE — 93005 ELECTROCARDIOGRAM TRACING: CPT | Performed by: STUDENT IN AN ORGANIZED HEALTH CARE EDUCATION/TRAINING PROGRAM

## 2024-08-08 PROCEDURE — 82803 BLOOD GASES ANY COMBINATION: CPT

## 2024-08-08 PROCEDURE — 99285 EMERGENCY DEPT VISIT HI MDM: CPT

## 2024-08-08 PROCEDURE — 6360000004 HC RX CONTRAST MEDICATION: Performed by: STUDENT IN AN ORGANIZED HEALTH CARE EDUCATION/TRAINING PROGRAM

## 2024-08-08 PROCEDURE — 71275 CT ANGIOGRAPHY CHEST: CPT

## 2024-08-08 PROCEDURE — 80053 COMPREHEN METABOLIC PANEL: CPT

## 2024-08-08 PROCEDURE — 84484 ASSAY OF TROPONIN QUANT: CPT

## 2024-08-08 PROCEDURE — 6360000002 HC RX W HCPCS

## 2024-08-08 PROCEDURE — 81001 URINALYSIS AUTO W/SCOPE: CPT

## 2024-08-08 PROCEDURE — 36415 COLL VENOUS BLD VENIPUNCTURE: CPT

## 2024-08-08 PROCEDURE — 96374 THER/PROPH/DIAG INJ IV PUSH: CPT

## 2024-08-08 PROCEDURE — 82947 ASSAY GLUCOSE BLOOD QUANT: CPT

## 2024-08-08 PROCEDURE — 96375 TX/PRO/DX INJ NEW DRUG ADDON: CPT

## 2024-08-08 RX ORDER — LISINOPRIL 5 MG/1
5 TABLET ORAL DAILY
COMMUNITY
Start: 2024-02-15 | End: 2025-02-14

## 2024-08-08 RX ORDER — ALBUTEROL SULFATE 90 UG/1
2 AEROSOL, METERED RESPIRATORY (INHALATION) 4 TIMES DAILY
COMMUNITY
Start: 2022-11-22

## 2024-08-08 RX ORDER — LORAZEPAM 2 MG/ML
1 INJECTION INTRAMUSCULAR ONCE
Status: COMPLETED | OUTPATIENT
Start: 2024-08-08 | End: 2024-08-08

## 2024-08-08 RX ORDER — ATORVASTATIN CALCIUM 80 MG/1
80 TABLET, FILM COATED ORAL DAILY
COMMUNITY
Start: 2024-02-15 | End: 2025-02-14

## 2024-08-08 RX ORDER — LORAZEPAM 2 MG/ML
INJECTION INTRAMUSCULAR
Status: COMPLETED
Start: 2024-08-08 | End: 2024-08-08

## 2024-08-08 RX ORDER — PREDNISONE 20 MG/1
20 TABLET ORAL
COMMUNITY
Start: 2022-11-22 | End: 2024-08-08

## 2024-08-08 RX ORDER — HYDRALAZINE HYDROCHLORIDE 20 MG/ML
10 INJECTION INTRAMUSCULAR; INTRAVENOUS ONCE
Status: COMPLETED | OUTPATIENT
Start: 2024-08-08 | End: 2024-08-08

## 2024-08-08 RX ORDER — HYDRALAZINE HYDROCHLORIDE 20 MG/ML
INJECTION INTRAMUSCULAR; INTRAVENOUS
Status: COMPLETED
Start: 2024-08-08 | End: 2024-08-08

## 2024-08-08 RX ADMIN — LORAZEPAM 1 MG: 2 INJECTION INTRAMUSCULAR; INTRAVENOUS at 05:35

## 2024-08-08 RX ADMIN — IOPAMIDOL 80 ML: 755 INJECTION, SOLUTION INTRAVENOUS at 05:47

## 2024-08-08 RX ADMIN — LORAZEPAM 1 MG: 2 INJECTION INTRAMUSCULAR at 05:35

## 2024-08-08 RX ADMIN — HYDRALAZINE HYDROCHLORIDE 10 MG: 20 INJECTION INTRAMUSCULAR; INTRAVENOUS at 05:34

## 2024-08-08 ASSESSMENT — PAIN - FUNCTIONAL ASSESSMENT
PAIN_FUNCTIONAL_ASSESSMENT: 0-10
PAIN_FUNCTIONAL_ASSESSMENT: NONE - DENIES PAIN

## 2024-08-08 ASSESSMENT — PAIN SCALES - GENERAL: PAINLEVEL_OUTOF10: 10

## 2024-08-08 NOTE — ED PROVIDER NOTES
[facility] EMERGENCY DEP  EMERGENCY DEPARTMENT ENCOUNTER      Pt Name: Jaz Noble  MRN: [id number]  Birthdate 1950  Date of evaluation: 8/8/2024  Provider: Britta Law MD    CHIEF COMPLAINT       Chief Complaint   Patient presents with    Abdominal Pain         HISTORY OF PRESENT ILLNESS    Nursing Triage Notes were reviewed.    HPI    Jaz Noble is a 73 y.o. female with a history of abdominal aortic aneurysm, hypertension, CAD, prior stroke who presents to the emergency department for evaluation of epigastric abdominal pain and chest pain.  Per EMS report, patient had used cocaine around midnight and is noncompliant with prescribed medications.  Patient complains that since using cocaine she has had chest pain.  Pain is constant, nonradiating, without other clear provoking and alleviating factors aside from cocaine use.  Complains of epigastric abdominal pain as well, without nausea or vomiting. Patient denies taking any prescribed medications regularly. Denies other substance use.        PAST MEDICAL HISTORY     Past Medical History:   Diagnosis Date    Anxiety     CAD (coronary artery disease)     Heart failure (HCC)     History of abdominal aortic aneurysm (AAA)     Hypertension     Psychiatric disorder     depression    Stroke (HCC)     1970    Stroke (HCC)          SURGICAL HISTORY       Past Surgical History:   Procedure Laterality Date    GYN      tubal ligation    INS NEW/RPLCMT PRM PM W/TRANSV ELTRD ATRIAL&VENT  1/7/2019         PACEMAKER PLACEMENT Left 2018    [place] UNLISTED PROCEDURE CARDIAC SURGERY           CURRENT MEDICATIONS       Previous Medications    ARIPIPRAZOLE (ABILIFY) 10 MG TABLET    Take 1 tablet by mouth daily    ASPIRIN 81 MG CHEWABLE TABLET    Take 1 tablet by mouth daily    DICYCLOMINE (BENTYL) 10 MG/5ML SYRUP    Take 10 mLs by mouth 4 times daily (before meals and nightly)    DILTIAZEM (TIAZAC) 120 MG EXTENDED RELEASE CAPSULE    Take 1 capsule by mouth daily     Physician who either signs or Co-signs this chart in the absence of a cardiologist.        RADIOLOGY:   Non-plain film images such as CT, Ultrasound and MRI are read by the radiologist. Plain radiographic images are visualized and preliminarily interpreted by the emergency physician with the below findings:        Interpretation per the Radiologist below, if available at the time of this note:    CTA CHEST ABDOMEN PELVIS W CONTRAST   Final Result   Stable distal thoracic and abdominal aortic aneurysm. No aortic dissection. No   other acute abnormality in the chest, abdomen, or pelvis.         Electronically signed by Moshe Seaman           LABS:  Labs Reviewed   EXTRA TUBES HOLD   CBC WITH AUTO DIFFERENTIAL   COMPREHENSIVE METABOLIC PANEL   TROPONIN   TROPONIN   URINALYSIS WITH REFLEX TO CULTURE       All other labs were within normal range or not returned as of this dictation.    EMERGENCY DEPARTMENT COURSE and DIFFERENTIAL DIAGNOSIS/MDM:   Vitals:    Vitals:    08/08/24 0519   BP: (!) 167/129   Pulse: 89   Resp: 10   Temp: 98.4 °F (36.9 °C)   TempSrc: Oral   SpO2: 100%   Weight: 46.3 kg (102 lb)   Height: 1.651 m (5' 5\")           Medical Decision Making  Amount and/or Complexity of Data Reviewed  Labs: ordered.  Radiology: ordered. Decision-making details documented in ED Course.  ECG/medicine tests: ordered.    Risk  Prescription drug management.      Chest and Abdominal Pain  with a history of abdominal aortic aneurysm, hypertension, CAD, prior stroke who presents to the emergency department for evaluation of epigastric abdominal pain and chest pain.  Per EMS report, patient had used cocaine around midnight and is noncompliant with prescribed medications.  Patient complains that since using cocaine she has had chest pain and epigastric abdominal pain.  Reports she uses cocaine approximately once monthly and is noncompliant with medications for HTN. Hypertensive on arrival. History and exam concerning for

## 2024-08-08 NOTE — ED NOTES
Assisted patient to restroom for urine. Pt ambulated to wheelchair and then to toilet w/o difficulty.

## 2024-08-08 NOTE — ED NOTES
[facility]  O- [phone]    [facility] Geriatric Consult Team  Renay MITCHELL-NP   Kathy Disla CM    Patient Name: Jaz Noble  YOB: 1950    Date of Initial Consult: 8/8/2024  Reason for Consult: Possible admission, geriatric medication assessment and ACP planning.   Requesting Provider: Dr Britta Law      SUMMARY:   Jaz Noble is a 73 y.o. with a past history of AAA, anxiety, asthma, COPD, CVA, HLD, HTN, who was arrived in the [facility] on 8/8/2024 from Home via EMS with a diagnosis of AAA, substance abuse, abdominal pain.     Current medical issues leading to Geriatric Consult  involvement include:   [] SNF Transfer  [x] Prior admission within 30 days- increased ER visits in the last 6 months  [x] Geriatric Medication Assessment- Patient states, \"I don't take any of the prescribed medications,\" \"smokes cocaine about once a month.\" Education discussed with Cocaine cessation and risk due to known HX AAA, unreceptive.   [x] Complex Medical Conditions  [] Complex Case Management and/or SDOH  [x] Goals of Care      GERIATRIC DIAGNOSES:   Non-compliance with medication, Substance abuse, AAA       PLAN:   Initial plan for admission, noted stable AAA on CT AND/PELV.  Patient to be discharged home with outpatient follow up care.   [x] Initial consult note routed to primary continuity provider and/or primary health care team members  [x] Communicated plan of care with: ED and/or Hospital Health Care Team    ADVANCE CARE PLANNING / TREATMENT PREFERENCES:     GOALS OF CARE:  []-Comfort   []-Cure   []-Prolong life   [x]-Recovery from acute illness   []-Rehabilitation  []-Other:         TREATMENT PREFERENCES:     Patient and family's personal goals include: Patient is non-participatory.     Important upcoming milestones or family events: N/A    The patient identifies the following as important for living well: Patient is non-participatory. Patient lives with her \"boyfriend,\" patient  Interpersonal Safety Domain Source: IP Abuse Screening     Physical abuse: Denies     Verbal abuse: Denies     Emotional abuse: Denies     Financial abuse: Denies     Sexual abuse: Denies   Utilities: Not on file       [] Palliative Medicine Consult Ordered  [] Hospice Consult Ordered    Any spiritual / Yazidism / cultural beliefs and practices that will affect your patient's care?:  [] Yes /  [x] No   If \"Yes\" to discuss with pastoral care during IDT     Does caregiver feel burdened by caring for their loved one:   [] Yes /  [x] No /  [] No Caregiver Present/Available [] No Caregiver [] Pt Lives at Facility  If \"Yes\" to discuss with social work during IDT       PHYSICAL EXAM:     From RN flowsheet:  Wt Readings from Last 3 Encounters:   08/08/24 46.3 kg (102 lb)   07/07/24 44.8 kg (98 lb 12.8 oz)   06/10/24 47.7 kg (105 lb 3.2 oz)     Blood pressure (!) 149/78, pulse 84, temperature 98.4 °F (36.9 °C), temperature source Oral, resp. rate 18, height 1.651 m (5' 5\"), weight 46.3 kg (102 lb), SpO2 98 %.    Last bowel movement, if known: N/A    Constitutional: Disheveled, cachetic elderly female. Arouses by voice and gentle touch.     HISTORY:     Active Problems:    * No active hospital problems. *  Resolved Problems:    * No resolved hospital problems. *    Past Medical History:   Diagnosis Date    Anxiety     CAD (coronary artery disease)     Heart failure (HCC)     History of abdominal aortic aneurysm (AAA)     Hypertension     Psychiatric disorder     depression    Stroke (HCC)     1970    Stroke (HCC)       Past Surgical History:   Procedure Laterality Date    GYN      tubal ligation    INS NEW/RPLCMT PRM PM W/TRANSV ELTRD ATRIAL&VENT  1/7/2019         PACEMAKER PLACEMENT Left 2018    [place] UNLISTED PROCEDURE CARDIAC SURGERY        History reviewed. No pertinent family history.   History reviewed, no pertinent family history.  Social History     Tobacco Use    Smoking status: Former     Types: Cigarettes

## 2024-08-08 NOTE — ED PROVIDER NOTES
Noted troponin drawn at 9 with several hours after patient had reported pain.  She is sleeping but was easy to arouse.  She is requesting food.  Denies any chest pain.  She does have significant comorbidities so we will discuss with patient    11:05 AM  No pain, tolerated po challenge, requests discharge     Cami Andujar,   08/08/24 [id number]

## 2024-08-08 NOTE — CARE COORDINATION
11:55 AM  CM consulted to assist with patient transport home.  Informed patient is ambulatory.  Address verified: [address] [address].  Referral placed to Round Trip.  ETA: Patient to be picked up at ED Entrance.   to contact patient registration in route and upon arrival.  No other CM needs at this time.    JENNIFER Escobedo/JOSHUA

## 2024-08-08 NOTE — ED TRIAGE NOTES
Pt BIBA from home for abdominal pain, epigastric for 1 day.  PT used cocaine at midnight and noncompliant with medications.

## 2024-08-08 NOTE — ED NOTES
RN to bedside. Pt updated on plan of care.  Lights turned out to promote rest.  PT has no needs at this time.  Call bell within reach.

## 2024-08-21 ENCOUNTER — HOSPITAL ENCOUNTER (EMERGENCY)
Facility: HOSPITAL | Age: 74
Discharge: HOME | End: 2024-08-21
Attending: EMERGENCY MEDICINE
Payer: MEDICARE

## 2024-08-21 ENCOUNTER — APPOINTMENT (OUTPATIENT)
Facility: HOSPITAL | Age: 74
End: 2024-08-21
Payer: MEDICARE

## 2024-08-21 VITALS
RESPIRATION RATE: 20 BRPM | DIASTOLIC BLOOD PRESSURE: 88 MMHG | OXYGEN SATURATION: 100 % | HEART RATE: 73 BPM | SYSTOLIC BLOOD PRESSURE: 167 MMHG | TEMPERATURE: 98.2 F

## 2024-08-21 DIAGNOSIS — R51.9 NONINTRACTABLE HEADACHE, UNSPECIFIED CHRONICITY PATTERN, UNSPECIFIED HEADACHE TYPE: Primary | ICD-10-CM

## 2024-08-21 LAB
ALBUMIN SERPL-MCNC: 3.4 G/DL (ref 3.5–5)
ALBUMIN/GLOB SERPL: 0.6 (ref 1.1–2.2)
ALP SERPL-CCNC: 101 U/L (ref 45–117)
ALT SERPL-CCNC: 6 U/L (ref 12–78)
ANION GAP SERPL CALC-SCNC: 5 MMOL/L (ref 5–15)
AST SERPL-CCNC: 14 U/L (ref 15–37)
BASOPHILS # BLD: 0 K/UL (ref 0–0.1)
BASOPHILS NFR BLD: 1 % (ref 0–1)
BILIRUB SERPL-MCNC: 0.3 MG/DL (ref 0.2–1)
BUN SERPL-MCNC: 23 MG/DL (ref 6–20)
BUN/CREAT SERPL: 22 (ref 12–20)
CALCIUM SERPL-MCNC: 9.8 MG/DL (ref 8.5–10.1)
CHLORIDE SERPL-SCNC: 108 MMOL/L (ref 97–108)
CO2 SERPL-SCNC: 24 MMOL/L (ref 21–32)
COMMENT:: NORMAL
CREAT SERPL-MCNC: 1.04 MG/DL (ref 0.55–1.02)
DIFFERENTIAL METHOD BLD: ABNORMAL
EOSINOPHIL # BLD: 0.1 K/UL (ref 0–0.4)
EOSINOPHIL NFR BLD: 4 % (ref 0–7)
ERYTHROCYTE [DISTWIDTH] IN BLOOD BY AUTOMATED COUNT: 16.5 % (ref 11.5–14.5)
GLOBULIN SER CALC-MCNC: 5.7 G/DL (ref 2–4)
GLUCOSE SERPL-MCNC: 98 MG/DL (ref 65–100)
HCT VFR BLD AUTO: 34.5 % (ref 35–47)
HGB BLD-MCNC: 10.7 G/DL (ref 11.5–16)
IMM GRANULOCYTES # BLD AUTO: 0 K/UL (ref 0–0.04)
IMM GRANULOCYTES NFR BLD AUTO: 0 % (ref 0–0.5)
LYMPHOCYTES # BLD: 0.9 K/UL (ref 0.8–3.5)
LYMPHOCYTES NFR BLD: 28 % (ref 12–49)
MCH RBC QN AUTO: 22.2 PG (ref 26–34)
MCHC RBC AUTO-ENTMCNC: 31 G/DL (ref 30–36.5)
MCV RBC AUTO: 71.6 FL (ref 80–99)
MONOCYTES # BLD: 0.4 K/UL (ref 0–1)
MONOCYTES NFR BLD: 13 % (ref 5–13)
NEUTS SEG # BLD: 1.8 K/UL (ref 1.8–8)
NEUTS SEG NFR BLD: 55 % (ref 32–75)
NRBC # BLD: 0 K/UL (ref 0–0.01)
NRBC BLD-RTO: 0 PER 100 WBC
PLATELET # BLD AUTO: 135 K/UL (ref 150–400)
POTASSIUM SERPL-SCNC: 4.9 MMOL/L (ref 3.5–5.1)
PROT SERPL-MCNC: 9.1 G/DL (ref 6.4–8.2)
RBC # BLD AUTO: 4.82 M/UL (ref 3.8–5.2)
SODIUM SERPL-SCNC: 137 MMOL/L (ref 136–145)
SPECIMEN HOLD: NORMAL
WBC # BLD AUTO: 3.3 K/UL (ref 3.6–11)

## 2024-08-21 PROCEDURE — 96374 THER/PROPH/DIAG INJ IV PUSH: CPT

## 2024-08-21 PROCEDURE — 6360000002 HC RX W HCPCS: Performed by: EMERGENCY MEDICINE

## 2024-08-21 PROCEDURE — 85025 COMPLETE CBC W/AUTO DIFF WBC: CPT

## 2024-08-21 PROCEDURE — 99284 EMERGENCY DEPT VISIT MOD MDM: CPT

## 2024-08-21 PROCEDURE — 36415 COLL VENOUS BLD VENIPUNCTURE: CPT

## 2024-08-21 PROCEDURE — 70450 CT HEAD/BRAIN W/O DYE: CPT

## 2024-08-21 PROCEDURE — 80053 COMPREHEN METABOLIC PANEL: CPT

## 2024-08-21 RX ORDER — BUTALBITAL, ACETAMINOPHEN AND CAFFEINE 50; 325; 40 MG/1; MG/1; MG/1
1 TABLET ORAL EVERY 4 HOURS PRN
Qty: 60 TABLET | Refills: 1 | Status: SHIPPED | OUTPATIENT
Start: 2024-08-21

## 2024-08-21 RX ORDER — MORPHINE SULFATE 2 MG/ML
2 INJECTION, SOLUTION INTRAMUSCULAR; INTRAVENOUS
Status: COMPLETED | OUTPATIENT
Start: 2024-08-21 | End: 2024-08-21

## 2024-08-21 RX ORDER — HYDROCODONE BITARTRATE AND ACETAMINOPHEN 5; 325 MG/1; MG/1
1 TABLET ORAL
Status: DISCONTINUED | OUTPATIENT
Start: 2024-08-21 | End: 2024-08-21

## 2024-08-21 RX ORDER — IBUPROFEN 400 MG/1
600 TABLET ORAL
Status: DISCONTINUED | OUTPATIENT
Start: 2024-08-21 | End: 2024-08-21 | Stop reason: HOSPADM

## 2024-08-21 RX ADMIN — MORPHINE SULFATE 2 MG: 2 INJECTION, SOLUTION INTRAMUSCULAR; INTRAVENOUS at 21:35

## 2024-08-21 ASSESSMENT — PAIN DESCRIPTION - DESCRIPTORS: DESCRIPTORS: ACHING

## 2024-08-21 ASSESSMENT — ENCOUNTER SYMPTOMS
EYE PAIN: 0
SHORTNESS OF BREATH: 0
ABDOMINAL PAIN: 0
CHEST TIGHTNESS: 0
COUGH: 0
DIARRHEA: 0
FACIAL SWELLING: 0
BACK PAIN: 0
EYE DISCHARGE: 0
VOMITING: 0
NAUSEA: 0
SORE THROAT: 0

## 2024-08-21 ASSESSMENT — PAIN DESCRIPTION - LOCATION
LOCATION: HEAD
LOCATION: HEAD

## 2024-08-21 ASSESSMENT — PAIN SCALES - GENERAL
PAINLEVEL_OUTOF10: 10
PAINLEVEL_OUTOF10: 10

## 2024-08-21 ASSESSMENT — PAIN DESCRIPTION - ORIENTATION: ORIENTATION: POSTERIOR

## 2024-08-21 ASSESSMENT — PAIN - FUNCTIONAL ASSESSMENT
PAIN_FUNCTIONAL_ASSESSMENT: ACTIVITIES ARE NOT PREVENTED
PAIN_FUNCTIONAL_ASSESSMENT: 0-10
PAIN_FUNCTIONAL_ASSESSMENT: PREVENTS OR INTERFERES SOME ACTIVE ACTIVITIES AND ADLS

## 2024-08-22 NOTE — ED NOTES
Pt refused covid/flu swab. This RN tried to explain the reasoning as to why she is being tested for these. Pt still refusing. MD made aware.

## 2024-08-22 NOTE — ED PROVIDER NOTES
Patty NIEVES, APRN - NP  [address]  [facility] [address] [phone]    Call       [facility] EMERGENCY DEP  [address] [facility] [address] [phone]    As needed, If symptoms worsen      DISCHARGE MEDICATIONS:  Discharge Medication List as of 8/21/2024  9:30 PM        START taking these medications    Details   butalbital-acetaminophen-caffeine (FIORICET, ESGIC) -40 MG per tablet Take 1 tablet by mouth every 4 hours as needed for Headaches, Disp-60 tablet, R-1Print               (Please note that portions of this note were completed with a voice recognition program.  Efforts were made to edit the dictations but occasionally words are mis-transcribed.)    Nilesh Chaparro MD (electronically signed)  Emergency Attending Physician / Physician Assistant / Nurse Practitioner             Nilesh Chaparro MD  08/22/24 4130

## 2024-10-31 ENCOUNTER — OFFICE VISIT (OUTPATIENT)
Age: 74
End: 2024-10-31

## 2024-10-31 DIAGNOSIS — Z95.0 S/P PLACEMENT OF CARDIAC PACEMAKER: Primary | ICD-10-CM

## 2024-11-06 ENCOUNTER — HOSPITAL ENCOUNTER (EMERGENCY)
Facility: HOSPITAL | Age: 74
Discharge: HOME | End: 2024-11-06
Payer: MEDICARE

## 2024-11-06 VITALS
TEMPERATURE: 98.5 F | BODY MASS INDEX: 17.47 KG/M2 | SYSTOLIC BLOOD PRESSURE: 126 MMHG | WEIGHT: 105 LBS | OXYGEN SATURATION: 100 % | RESPIRATION RATE: 18 BRPM | HEART RATE: 70 BPM | DIASTOLIC BLOOD PRESSURE: 57 MMHG

## 2024-11-06 DIAGNOSIS — M79.604 BILATERAL LEG PAIN: Primary | ICD-10-CM

## 2024-11-06 DIAGNOSIS — M79.605 BILATERAL LEG PAIN: Primary | ICD-10-CM

## 2024-11-06 DIAGNOSIS — I73.9 PERIPHERAL ARTERIAL DISEASE (HCC): ICD-10-CM

## 2024-11-06 PROCEDURE — 6370000000 HC RX 637 (ALT 250 FOR IP)

## 2024-11-06 PROCEDURE — 99283 EMERGENCY DEPT VISIT LOW MDM: CPT

## 2024-11-06 RX ORDER — TRAMADOL HYDROCHLORIDE 50 MG/1
50 TABLET ORAL
Status: COMPLETED | OUTPATIENT
Start: 2024-11-06 | End: 2024-11-06

## 2024-11-06 RX ADMIN — TRAMADOL HYDROCHLORIDE 50 MG: 50 TABLET ORAL at 15:16

## 2024-11-06 ASSESSMENT — PAIN SCALES - GENERAL
PAINLEVEL_OUTOF10: 10
PAINLEVEL_OUTOF10: 10

## 2024-11-06 ASSESSMENT — PAIN - FUNCTIONAL ASSESSMENT: PAIN_FUNCTIONAL_ASSESSMENT: 0-10

## 2024-11-06 NOTE — ED NOTES
Pt presents ambulatory to ED complaining of chronic bilateral leg pain. Pt reports hx of stent placement in legs. Pt denies taking prescribed mediccations for PMH. Pt took yelnol today w no relief. Pt tearful during assessment. Pt is alert and oriented x 4, RR even and unlabored, skin is warm and dry. Assesment completed and pt updated on plan of care.       Emergency Department Nursing Plan of Care       The Nursing Plan of Care is developed from the Nursing assessment and Emergency Department Attending provider initial evaluation.  The plan of care may be reviewed in the “ED Provider note”.    The Plan of Care was developed with the following considerations:   Patient / Family readiness to learn indicated by:verbalized understanding  Persons(s) to be included in education: patient  Barriers to Learning/Limitations:None    Signed     Gema Roa RN    11/6/2024   3:06 PM

## 2024-11-06 NOTE — ED PROVIDER NOTES
to go home.      PATIENT REFERRED TO:  Patty Burroughs, APRN - NP  [address] [id number] [phone]    Schedule an appointment as soon as possible for a visit   If symptoms worsen       DISCHARGE MEDICATIONS:     Medication List        ASK your doctor about these medications      albuterol sulfate  (90 Base) MCG/ACT inhaler  Commonly known as: PROVENTIL;VENTOLIN;PROAIR     ARIPiprazole 10 MG tablet  Commonly known as: ABILIFY     aspirin 81 MG chewable tablet     atorvastatin 80 MG tablet  Commonly known as: LIPITOR     butalbital-acetaminophen-caffeine -40 MG per tablet  Commonly known as: FIORICET, ESGIC  Take 1 tablet by mouth every 4 hours as needed for Headaches     dilTIAZem 120 MG extended release capsule  Commonly known as: TIAZAC     linaCLOtide 72 MCG Caps capsule  Commonly known as: LINZESS  Take 1 capsule by mouth every morning (before breakfast)     lisinopril 5 MG tablet  Commonly known as: PRINIVIL;ZESTRIL     mirtazapine 15 MG tablet  Commonly known as: REMERON     pantoprazole 40 MG tablet  Commonly known as: PROTONIX  Take 1 tablet by mouth every morning (before breakfast)                DISCONTINUED MEDICATIONS:  Current Discharge Medication List          I have seen and evaluated the patient autonomously. My supervision physician was on site and available for consultation if needed.     I am the Primary Clinician of Record.   Cely Cavazos PA-C (electronically signed)    (Please note that parts of this dictation were completed with voice recognition software. Quite often unanticipated grammatical, syntax, homophones, and other interpretive errors are inadvertently transcribed by the computer software. Please disregards these errors. Please excuse any errors that have escaped final proofreading.)     Cely Cavazos PA-C  11/06/24 6527

## 2024-11-06 NOTE — ED TRIAGE NOTES
Pt presents via EMS w c/o bilateral leg pain for unknown period of time. Pt reports it has been a long itme. Pt states she had stents placed in her legs years ago. Pt reports PMH but states she is not taking any of her prescribed meds due to them making her sick. Pt reports taking tyelnol today.

## 2024-11-06 NOTE — ED NOTES
Discharge instructions were given to the patient by janie.  The patient left the Emergency Department alert and oriented and in no acute distress with 0 prescription(s). The patient was encouraged to call or return to the ED for worsening issues or problems and was encouraged to schedule a follow up appointment for continuing care.  The patient verbalized understanding of discharge instructions and prescriptions; all questions were answered. The patient has no further concerns at this time.

## 2024-12-06 ENCOUNTER — APPOINTMENT (OUTPATIENT)
Facility: HOSPITAL | Age: 74
End: 2024-12-06
Payer: MEDICARE

## 2024-12-06 ENCOUNTER — HOSPITAL ENCOUNTER (EMERGENCY)
Facility: HOSPITAL | Age: 74
Discharge: HOME | End: 2024-12-06
Attending: EMERGENCY MEDICINE
Payer: MEDICARE

## 2024-12-06 VITALS
SYSTOLIC BLOOD PRESSURE: 142 MMHG | TEMPERATURE: 98.1 F | HEIGHT: 65 IN | OXYGEN SATURATION: 99 % | RESPIRATION RATE: 14 BRPM | WEIGHT: 105 LBS | BODY MASS INDEX: 17.49 KG/M2 | HEART RATE: 77 BPM | DIASTOLIC BLOOD PRESSURE: 94 MMHG

## 2024-12-06 DIAGNOSIS — R10.13 ABDOMINAL PAIN, EPIGASTRIC: Primary | ICD-10-CM

## 2024-12-06 DIAGNOSIS — R07.9 CHEST PAIN, UNSPECIFIED TYPE: ICD-10-CM

## 2024-12-06 LAB
ALBUMIN SERPL-MCNC: 2.8 G/DL (ref 3.5–5)
ALBUMIN/GLOB SERPL: 0.6 (ref 1.1–2.2)
ALP SERPL-CCNC: 93 U/L (ref 45–117)
ALT SERPL-CCNC: 9 U/L (ref 12–78)
ANION GAP SERPL CALC-SCNC: 6 MMOL/L (ref 2–12)
APPEARANCE UR: CLEAR
AST SERPL-CCNC: 10 U/L (ref 15–37)
BACTERIA URNS QL MICRO: NEGATIVE /HPF
BASOPHILS # BLD: 0 K/UL (ref 0–0.1)
BASOPHILS NFR BLD: 1 % (ref 0–1)
BILIRUB SERPL-MCNC: 0.3 MG/DL (ref 0.2–1)
BILIRUB UR QL: NEGATIVE
BUN SERPL-MCNC: 18 MG/DL (ref 6–20)
BUN/CREAT SERPL: 16 (ref 12–20)
CALCIUM SERPL-MCNC: 8.7 MG/DL (ref 8.5–10.1)
CHLORIDE SERPL-SCNC: 112 MMOL/L (ref 97–108)
CO2 SERPL-SCNC: 23 MMOL/L (ref 21–32)
COLOR UR: NORMAL
COMMENT:: NORMAL
COMMENT:: NORMAL
CREAT SERPL-MCNC: 1.14 MG/DL (ref 0.55–1.02)
DIFFERENTIAL METHOD BLD: ABNORMAL
EKG ATRIAL RATE: 76 BPM
EKG DIAGNOSIS: NORMAL
EKG P AXIS: 65 DEGREES
EKG P-R INTERVAL: 132 MS
EKG Q-T INTERVAL: 398 MS
EKG QRS DURATION: 82 MS
EKG QTC CALCULATION (BAZETT): 447 MS
EKG R AXIS: 9 DEGREES
EKG T AXIS: 55 DEGREES
EKG VENTRICULAR RATE: 76 BPM
EOSINOPHIL # BLD: 0.1 K/UL (ref 0–0.4)
EOSINOPHIL NFR BLD: 4 % (ref 0–7)
EPITH CASTS URNS QL MICRO: NORMAL /LPF
ERYTHROCYTE [DISTWIDTH] IN BLOOD BY AUTOMATED COUNT: 16.1 % (ref 11.5–14.5)
GLOBULIN SER CALC-MCNC: 5 G/DL (ref 2–4)
GLUCOSE SERPL-MCNC: 95 MG/DL (ref 65–100)
GLUCOSE UR STRIP.AUTO-MCNC: NEGATIVE MG/DL
HCT VFR BLD AUTO: 30.1 % (ref 35–47)
HGB BLD-MCNC: 9.4 G/DL (ref 11.5–16)
HGB UR QL STRIP: NEGATIVE
HYALINE CASTS URNS QL MICRO: NORMAL /LPF (ref 0–5)
IMM GRANULOCYTES # BLD AUTO: 0 K/UL (ref 0–0.04)
IMM GRANULOCYTES NFR BLD AUTO: 0 % (ref 0–0.5)
KETONES UR QL STRIP.AUTO: NEGATIVE MG/DL
LEUKOCYTE ESTERASE UR QL STRIP.AUTO: NEGATIVE
LIPASE SERPL-CCNC: 69 U/L (ref 13–75)
LYMPHOCYTES # BLD: 0.9 K/UL (ref 0.8–3.5)
LYMPHOCYTES NFR BLD: 30 % (ref 12–49)
MCH RBC QN AUTO: 22.4 PG (ref 26–34)
MCHC RBC AUTO-ENTMCNC: 31.2 G/DL (ref 30–36.5)
MCV RBC AUTO: 71.7 FL (ref 80–99)
MONOCYTES # BLD: 0.4 K/UL (ref 0–1)
MONOCYTES NFR BLD: 15 % (ref 5–13)
NEUTS SEG # BLD: 1.4 K/UL (ref 1.8–8)
NEUTS SEG NFR BLD: 50 % (ref 32–75)
NITRITE UR QL STRIP.AUTO: NEGATIVE
NRBC # BLD: 0 K/UL (ref 0–0.01)
NRBC BLD-RTO: 0 PER 100 WBC
PH UR STRIP: 7.5 (ref 5–8)
PLATELET # BLD AUTO: 165 K/UL (ref 150–400)
PMV BLD AUTO: 10.1 FL (ref 8.9–12.9)
POTASSIUM SERPL-SCNC: 4.5 MMOL/L (ref 3.5–5.1)
PROT SERPL-MCNC: 7.8 G/DL (ref 6.4–8.2)
PROT UR STRIP-MCNC: NEGATIVE MG/DL
RBC # BLD AUTO: 4.2 M/UL (ref 3.8–5.2)
RBC #/AREA URNS HPF: NORMAL /HPF (ref 0–5)
SODIUM SERPL-SCNC: 141 MMOL/L (ref 136–145)
SP GR UR REFRACTOMETRY: 1.02 (ref 1–1.03)
SPECIMEN HOLD: NORMAL
TROPONIN I SERPL HS-MCNC: 20 NG/L (ref 0–51)
TROPONIN I SERPL HS-MCNC: 22 NG/L (ref 0–51)
UROBILINOGEN UR QL STRIP.AUTO: 0.2 EU/DL (ref 0.2–1)
WBC # BLD AUTO: 2.9 K/UL (ref 3.6–11)
WBC URNS QL MICRO: NORMAL /HPF (ref 0–4)

## 2024-12-06 PROCEDURE — 83690 ASSAY OF LIPASE: CPT

## 2024-12-06 PROCEDURE — 80053 COMPREHEN METABOLIC PANEL: CPT

## 2024-12-06 PROCEDURE — 96376 TX/PRO/DX INJ SAME DRUG ADON: CPT

## 2024-12-06 PROCEDURE — 6360000002 HC RX W HCPCS: Performed by: EMERGENCY MEDICINE

## 2024-12-06 PROCEDURE — 84484 ASSAY OF TROPONIN QUANT: CPT

## 2024-12-06 PROCEDURE — 6360000004 HC RX CONTRAST MEDICATION: Performed by: EMERGENCY MEDICINE

## 2024-12-06 PROCEDURE — 96374 THER/PROPH/DIAG INJ IV PUSH: CPT

## 2024-12-06 PROCEDURE — 85025 COMPLETE CBC W/AUTO DIFF WBC: CPT

## 2024-12-06 PROCEDURE — 71275 CT ANGIOGRAPHY CHEST: CPT

## 2024-12-06 PROCEDURE — 93005 ELECTROCARDIOGRAM TRACING: CPT | Performed by: EMERGENCY MEDICINE

## 2024-12-06 PROCEDURE — 99285 EMERGENCY DEPT VISIT HI MDM: CPT

## 2024-12-06 PROCEDURE — 96375 TX/PRO/DX INJ NEW DRUG ADDON: CPT

## 2024-12-06 PROCEDURE — 36415 COLL VENOUS BLD VENIPUNCTURE: CPT

## 2024-12-06 PROCEDURE — 81001 URINALYSIS AUTO W/SCOPE: CPT

## 2024-12-06 RX ORDER — ONDANSETRON 2 MG/ML
4 INJECTION INTRAMUSCULAR; INTRAVENOUS ONCE
Status: COMPLETED | OUTPATIENT
Start: 2024-12-06 | End: 2024-12-06

## 2024-12-06 RX ORDER — MORPHINE SULFATE 2 MG/ML
2 INJECTION, SOLUTION INTRAMUSCULAR; INTRAVENOUS
Status: COMPLETED | OUTPATIENT
Start: 2024-12-06 | End: 2024-12-06

## 2024-12-06 RX ORDER — IOPAMIDOL 755 MG/ML
100 INJECTION, SOLUTION INTRAVASCULAR
Status: COMPLETED | OUTPATIENT
Start: 2024-12-06 | End: 2024-12-06

## 2024-12-06 RX ADMIN — MORPHINE SULFATE 2 MG: 2 INJECTION, SOLUTION INTRAMUSCULAR; INTRAVENOUS at 03:04

## 2024-12-06 RX ADMIN — IOPAMIDOL 100 ML: 755 INJECTION, SOLUTION INTRAVENOUS at 01:50

## 2024-12-06 RX ADMIN — MORPHINE SULFATE 2 MG: 2 INJECTION, SOLUTION INTRAMUSCULAR; INTRAVENOUS at 01:02

## 2024-12-06 RX ADMIN — ONDANSETRON 4 MG: 2 INJECTION INTRAMUSCULAR; INTRAVENOUS at 01:01

## 2024-12-06 ASSESSMENT — PAIN DESCRIPTION - DESCRIPTORS
DESCRIPTORS: ACHING

## 2024-12-06 ASSESSMENT — PAIN SCALES - GENERAL
PAINLEVEL_OUTOF10: 10
PAINLEVEL_OUTOF10: 7
PAINLEVEL_OUTOF10: 10
PAINLEVEL_OUTOF10: 10
PAINLEVEL_OUTOF10: 5

## 2024-12-06 ASSESSMENT — PAIN DESCRIPTION - LOCATION
LOCATION: ABDOMEN
LOCATION: ABDOMEN;CHEST
LOCATION: ABDOMEN

## 2024-12-06 ASSESSMENT — ENCOUNTER SYMPTOMS
DIARRHEA: 0
BACK PAIN: 0
VOMITING: 0
ABDOMINAL PAIN: 0
SHORTNESS OF BREATH: 0
EYE DISCHARGE: 0
SORE THROAT: 0
CHEST TIGHTNESS: 0
EYE PAIN: 0
FACIAL SWELLING: 0
COUGH: 0
NAUSEA: 0

## 2024-12-06 ASSESSMENT — PAIN DESCRIPTION - ORIENTATION
ORIENTATION: MID;LEFT
ORIENTATION: MID
ORIENTATION: MID;LEFT
ORIENTATION: MID;LEFT;LOWER
ORIENTATION: MID

## 2024-12-06 ASSESSMENT — PAIN - FUNCTIONAL ASSESSMENT
PAIN_FUNCTIONAL_ASSESSMENT: PREVENTS OR INTERFERES SOME ACTIVE ACTIVITIES AND ADLS
PAIN_FUNCTIONAL_ASSESSMENT: 0-10

## 2024-12-06 NOTE — ED TRIAGE NOTES
Patient BIBA via stretcher from home due to chest pain, abdominal/epigastric pain since evening. Denies N/V. EKG done by EMS en route and it shows ST elevation. Patient verbalized LLQ abdominal pain.    PMHx: MI, CHF, HTN, with pacemaker

## 2024-12-06 NOTE — ED PROVIDER NOTES
[facility] EMERGENCY DEP  EMERGENCY DEPARTMENT ENCOUNTER      Pt Name: Jaz Noble  MRN: [id number]  Birthdate 1950  Date of evaluation: 12/6/2024  Provider: Nilesh Chaparro MD    CHIEF COMPLAINT       Chief Complaint   Patient presents with    Chest Pain    Abdominal Pain         HISTORY OF PRESENT ILLNESS   (Location/Symptom, Timing/Onset, Context/Setting, Quality, Duration, Modifying Factors, Severity)  Note limiting factors.   9-year-old female with abdominal and chest pain today.  Brought by EMS with no nausea or vomiting.  She has a history of aortic aneurysm of the abdomen.  No surgical repairs of that.  He also has a history of heart failure and coronary artery disease.    The history is provided by the patient.   Chest Pain  Pain location:  Epigastric  Pain quality: aching and dull    Pain radiates to:  Does not radiate  Pain severity:  Mild  Onset quality:  Gradual  Timing:  Constant  Progression:  Partially resolved  Chronicity:  Recurrent  Context: not breathing, not drug use, not eating, not lifting and not movement    Relieved by:  Nothing  Worsened by:  Nothing  Ineffective treatments:  None tried  Associated symptoms: no abdominal pain, no back pain, no cough, no diaphoresis, no dizziness, no headache, no nausea, no palpitations, no shortness of breath and no vomiting          Review of External Medical Records:     Nursing Notes were reviewed.    REVIEW OF SYSTEMS    (2-9 systems for level 4, 10 or more for level 5)     Review of Systems   Constitutional:  Negative for activity change, appetite change, chills and diaphoresis.   HENT:  Negative for congestion, ear pain, facial swelling and sore throat.    Eyes:  Negative for pain, discharge and visual disturbance.   Respiratory:  Negative for cough, chest tightness and shortness of breath.    Cardiovascular:  Positive for chest pain. Negative for palpitations.   Gastrointestinal:  Negative for abdominal pain, diarrhea, nausea and vomiting.    Endocrine: Negative for cold intolerance, heat intolerance, polydipsia and polyphagia.   Genitourinary:  Negative for difficulty urinating, dysuria, frequency, hematuria, urgency and vaginal discharge.   Musculoskeletal:  Negative for arthralgias, back pain, joint swelling and myalgias.   Skin:  Negative for rash and wound.   Neurological:  Negative for dizziness, syncope, facial asymmetry and headaches.   Psychiatric/Behavioral:  Negative for agitation, behavioral problems and confusion.    All other systems reviewed and are negative.      Except as noted above the remainder of the review of systems was reviewed and negative.       PAST MEDICAL HISTORY     Past Medical History:   Diagnosis Date    Anxiety     CAD (coronary artery disease)     Heart failure (HCC)     History of abdominal aortic aneurysm (AAA)     Hypertension     Psychiatric disorder     depression    Stroke (HCC)     1970    Stroke (HCC)          SURGICAL HISTORY       Past Surgical History:   Procedure Laterality Date    GYN      tubal ligation    INS NEW/RPLCMT PRM PM W/TRANSV ELTRD ATRIAL&VENT  1/7/2019         PACEMAKER PLACEMENT Left 2018    [place] UNLISTED PROCEDURE CARDIAC SURGERY           CURRENT MEDICATIONS       Previous Medications    ALBUTEROL SULFATE HFA (PROVENTIL;VENTOLIN;PROAIR) 108 (90 BASE) MCG/ACT INHALER    Inhale 2 puffs into the lungs 4 times daily    ARIPIPRAZOLE (ABILIFY) 10 MG TABLET    Take 1 tablet by mouth daily    ASPIRIN 81 MG CHEWABLE TABLET    Take 1 tablet by mouth daily    ATORVASTATIN (LIPITOR) 80 MG TABLET    Take 1 tablet by mouth daily    BUTALBITAL-ACETAMINOPHEN-CAFFEINE (FIORICET, ESGIC) -40 MG PER TABLET    Take 1 tablet by mouth every 4 hours as needed for Headaches    DILTIAZEM (TIAZAC) 120 MG EXTENDED RELEASE CAPSULE    Take 1 capsule by mouth daily    LINACLOTIDE (LINZESS) 72 MCG CAPS CAPSULE    Take 1 capsule by mouth every morning (before breakfast)    LISINOPRIL (PRINIVIL;ZESTRIL) 5 MG TABLET

## 2024-12-16 ENCOUNTER — HOSPITAL ENCOUNTER (EMERGENCY)
Facility: HOSPITAL | Age: 74
Discharge: HOME | End: 2024-12-16
Attending: EMERGENCY MEDICINE
Payer: MEDICARE

## 2024-12-16 ENCOUNTER — APPOINTMENT (OUTPATIENT)
Facility: HOSPITAL | Age: 74
End: 2024-12-16
Payer: MEDICARE

## 2024-12-16 VITALS
DIASTOLIC BLOOD PRESSURE: 68 MMHG | TEMPERATURE: 98.5 F | RESPIRATION RATE: 18 BRPM | OXYGEN SATURATION: 98 % | SYSTOLIC BLOOD PRESSURE: 119 MMHG | HEART RATE: 86 BPM

## 2024-12-16 DIAGNOSIS — I71.40 ABDOMINAL AORTIC ANEURYSM (AAA) WITHOUT RUPTURE, UNSPECIFIED PART (HCC): ICD-10-CM

## 2024-12-16 DIAGNOSIS — R10.9 CHRONIC LEFT FLANK PAIN: Primary | ICD-10-CM

## 2024-12-16 DIAGNOSIS — G89.29 ABDOMINAL PAIN, CHRONIC, LEFT UPPER QUADRANT: ICD-10-CM

## 2024-12-16 DIAGNOSIS — G89.29 CHRONIC LEFT FLANK PAIN: Primary | ICD-10-CM

## 2024-12-16 DIAGNOSIS — R10.12 ABDOMINAL PAIN, CHRONIC, LEFT UPPER QUADRANT: ICD-10-CM

## 2024-12-16 LAB
ALBUMIN SERPL-MCNC: 3.1 G/DL (ref 3.5–5)
ALBUMIN/GLOB SERPL: 0.5 (ref 1.1–2.2)
ALP SERPL-CCNC: 124 U/L (ref 45–117)
ALT SERPL-CCNC: 10 U/L (ref 12–78)
ANION GAP SERPL CALC-SCNC: 2 MMOL/L (ref 2–12)
APPEARANCE UR: CLEAR
AST SERPL-CCNC: 33 U/L (ref 15–37)
BACTERIA URNS QL MICRO: NEGATIVE /HPF
BASOPHILS # BLD: 0 K/UL (ref 0–0.1)
BASOPHILS NFR BLD: 1 % (ref 0–1)
BILIRUB SERPL-MCNC: 0.3 MG/DL (ref 0.2–1)
BILIRUB UR QL: NEGATIVE
BUN SERPL-MCNC: 22 MG/DL (ref 6–20)
BUN/CREAT SERPL: 21 (ref 12–20)
CALCIUM SERPL-MCNC: 9.6 MG/DL (ref 8.5–10.1)
CHLORIDE SERPL-SCNC: 108 MMOL/L (ref 97–108)
CO2 SERPL-SCNC: 25 MMOL/L (ref 21–32)
COLOR UR: NORMAL
COMMENT:: NORMAL
CREAT SERPL-MCNC: 1.05 MG/DL (ref 0.55–1.02)
DIFFERENTIAL METHOD BLD: ABNORMAL
EOSINOPHIL # BLD: 0.1 K/UL (ref 0–0.4)
EOSINOPHIL NFR BLD: 3 % (ref 0–7)
EPITH CASTS URNS QL MICRO: NORMAL /LPF
ERYTHROCYTE [DISTWIDTH] IN BLOOD BY AUTOMATED COUNT: 15.7 % (ref 11.5–14.5)
GLOBULIN SER CALC-MCNC: 5.7 G/DL (ref 2–4)
GLUCOSE SERPL-MCNC: 92 MG/DL (ref 65–100)
GLUCOSE UR STRIP.AUTO-MCNC: NEGATIVE MG/DL
HCT VFR BLD AUTO: 34.7 % (ref 35–47)
HGB BLD-MCNC: 10.9 G/DL (ref 11.5–16)
HGB UR QL STRIP: NEGATIVE
IMM GRANULOCYTES # BLD AUTO: 0 K/UL (ref 0–0.04)
IMM GRANULOCYTES NFR BLD AUTO: 0 % (ref 0–0.5)
KETONES UR QL STRIP.AUTO: NEGATIVE MG/DL
LEUKOCYTE ESTERASE UR QL STRIP.AUTO: NEGATIVE
LIPASE SERPL-CCNC: 52 U/L (ref 13–75)
LYMPHOCYTES # BLD: 1.2 K/UL (ref 0.8–3.5)
LYMPHOCYTES NFR BLD: 29 % (ref 12–49)
MCH RBC QN AUTO: 22.3 PG (ref 26–34)
MCHC RBC AUTO-ENTMCNC: 31.4 G/DL (ref 30–36.5)
MCV RBC AUTO: 71 FL (ref 80–99)
MONOCYTES # BLD: 0.5 K/UL (ref 0–1)
MONOCYTES NFR BLD: 13 % (ref 5–13)
NEUTS SEG # BLD: 2.2 K/UL (ref 1.8–8)
NEUTS SEG NFR BLD: 54 % (ref 32–75)
NITRITE UR QL STRIP.AUTO: NEGATIVE
NRBC # BLD: 0 K/UL (ref 0–0.01)
NRBC BLD-RTO: 0 PER 100 WBC
PH UR STRIP: 6 (ref 5–8)
PLATELET # BLD AUTO: 146 K/UL (ref 150–400)
POTASSIUM SERPL-SCNC: 5.3 MMOL/L (ref 3.5–5.1)
PROT SERPL-MCNC: 8.8 G/DL (ref 6.4–8.2)
PROT UR STRIP-MCNC: NEGATIVE MG/DL
RBC # BLD AUTO: 4.89 M/UL (ref 3.8–5.2)
RBC #/AREA URNS HPF: NORMAL /HPF (ref 0–5)
SODIUM SERPL-SCNC: 135 MMOL/L (ref 136–145)
SP GR UR REFRACTOMETRY: 1.01 (ref 1–1.03)
SPECIMEN HOLD: NORMAL
URINE CULTURE IF INDICATED: NORMAL
UROBILINOGEN UR QL STRIP.AUTO: 0.2 EU/DL (ref 0.2–1)
WBC # BLD AUTO: 4 K/UL (ref 3.6–11)
WBC URNS QL MICRO: NORMAL /HPF (ref 0–4)

## 2024-12-16 PROCEDURE — 99285 EMERGENCY DEPT VISIT HI MDM: CPT

## 2024-12-16 PROCEDURE — 71275 CT ANGIOGRAPHY CHEST: CPT

## 2024-12-16 PROCEDURE — 85025 COMPLETE CBC W/AUTO DIFF WBC: CPT

## 2024-12-16 PROCEDURE — 81001 URINALYSIS AUTO W/SCOPE: CPT

## 2024-12-16 PROCEDURE — 6360000004 HC RX CONTRAST MEDICATION: Performed by: RADIOLOGY

## 2024-12-16 PROCEDURE — 80053 COMPREHEN METABOLIC PANEL: CPT

## 2024-12-16 PROCEDURE — 83690 ASSAY OF LIPASE: CPT

## 2024-12-16 PROCEDURE — 36415 COLL VENOUS BLD VENIPUNCTURE: CPT

## 2024-12-16 RX ORDER — ACETAMINOPHEN 500 MG
1000 TABLET ORAL
Status: DISCONTINUED | OUTPATIENT
Start: 2024-12-16 | End: 2024-12-16 | Stop reason: HOSPADM

## 2024-12-16 RX ORDER — IOPAMIDOL 755 MG/ML
100 INJECTION, SOLUTION INTRAVASCULAR
Status: COMPLETED | OUTPATIENT
Start: 2024-12-16 | End: 2024-12-16

## 2024-12-16 RX ADMIN — IOPAMIDOL 80 ML: 755 INJECTION, SOLUTION INTRAVENOUS at 03:48

## 2024-12-16 ASSESSMENT — PAIN DESCRIPTION - ORIENTATION: ORIENTATION: RIGHT;LEFT;UPPER;LOWER

## 2024-12-16 ASSESSMENT — PAIN DESCRIPTION - DESCRIPTORS
DESCRIPTORS: DISCOMFORT;TENDER
DESCRIPTORS: TENDER;DISCOMFORT;SHARP

## 2024-12-16 ASSESSMENT — PAIN SCALES - GENERAL
PAINLEVEL_OUTOF10: 8
PAINLEVEL_OUTOF10: 10

## 2024-12-16 ASSESSMENT — PAIN - FUNCTIONAL ASSESSMENT
PAIN_FUNCTIONAL_ASSESSMENT: 0-10
PAIN_FUNCTIONAL_ASSESSMENT: PREVENTS OR INTERFERES SOME ACTIVE ACTIVITIES AND ADLS
PAIN_FUNCTIONAL_ASSESSMENT: PREVENTS OR INTERFERES WITH MANY ACTIVE NOT PASSIVE ACTIVITIES

## 2024-12-16 ASSESSMENT — PAIN DESCRIPTION - LOCATION
LOCATION: ABDOMEN;BACK
LOCATION: ABDOMEN;BACK

## 2024-12-16 ASSESSMENT — PAIN DESCRIPTION - FREQUENCY
FREQUENCY: CONTINUOUS
FREQUENCY: CONTINUOUS

## 2024-12-16 ASSESSMENT — PAIN DESCRIPTION - ONSET
ONSET: ON-GOING
ONSET: ON-GOING

## 2024-12-16 ASSESSMENT — PAIN DESCRIPTION - PAIN TYPE
TYPE: CHRONIC PAIN
TYPE: ACUTE PAIN

## 2024-12-16 NOTE — ED NOTES
Patient given discharge instructions. No questions or concerns at this time. Patients VSS and in no acute distress. Patient ambulatory out of unit at discharge to wait for her round trip ride. ETA 0630 ride.

## 2024-12-16 NOTE — ED PROVIDER NOTES
EMERGENCY DEPARTMENT PHYSICIAN NOTE     Patient: Jaz Noble     Time of Service: 12/16/2024 12:20 AM     Chief complaint:   Chief Complaint   Patient presents with    Abdominal Pain        HISTORY:  Patient is a 74 y.o. female who presents to the emergency department with complaints of abdominal pain       Past Medical History:   Diagnosis Date    Anxiety     CAD (coronary artery disease)     Heart failure (HCC)     History of abdominal aortic aneurysm (AAA)     Hypertension     Psychiatric disorder     depression    Stroke (HCC)     1970    Stroke (HCC)         Past Surgical History:   Procedure Laterality Date    GYN      tubal ligation    INS NEW/RPLCMT PRM PM W/TRANSV ELTRD ATRIAL&VENT  1/7/2019         PACEMAKER PLACEMENT Left 2018    [place] UNLISTED PROCEDURE CARDIAC SURGERY          No family history on file.     Social History     Socioeconomic History    Marital status:    Tobacco Use    Smoking status: Former     Types: Cigarettes    Smokeless tobacco: Never   Substance and Sexual Activity    Alcohol use: Never    Drug use: Yes     Types: Cocaine, Marijuana (Weed)   Social History Narrative         ** Merged History Encounter **     Social Determinants of Health     Financial Resource Strain: Low Risk  (3/20/2023)    [facility]    Overall Financial Resource Strain (CARDIA)     Difficulty of Paying Living Expenses: Not very hard   Food Insecurity: Food Insecurity Present (9/3/2024)    Received from [facility]    Hunger Vital Sign     Worried About Running Out of Food in the Last Year: Sometimes true     Ran Out of Food in the Last Year: Sometimes true   Transportation Needs: No Transportation Needs (9/3/2024)    Received from [facility]    PRAPARE - Transportation     Lack of Transportation (Medical): No     Lack of Transportation (Non-Medical): No   Housing Stability: Low Risk  (9/3/2024)    Received from [facility]    Housing Stability Vital Sign     Unable to Pay

## 2024-12-16 NOTE — ED NOTES
Bedside and Verbal shift change report given to KERVIN Rodriguez (oncoming nurse) by KERVIN Grover (offgoing nurse). Report included the following information ED SBAR, MAR, Recent Results, and Med Rec Status.

## 2024-12-16 NOTE — ED TRIAGE NOTES
BIBA from co LUQ pain with pain in her back. Pt was seen for similar pain about 1 week ago. Denies N/V, chest pain.    Pt has significant medical hx

## 2025-02-13 ENCOUNTER — HOSPITAL ENCOUNTER (EMERGENCY)
Facility: HOSPITAL | Age: 75
Discharge: HOME | End: 2025-02-14
Attending: EMERGENCY MEDICINE
Payer: MEDICARE

## 2025-02-13 DIAGNOSIS — R10.84 GENERALIZED ABDOMINAL PAIN: Primary | ICD-10-CM

## 2025-02-13 LAB
ALBUMIN SERPL-MCNC: 3 G/DL (ref 3.5–5)
ALBUMIN/GLOB SERPL: 0.6 (ref 1.1–2.2)
ALP SERPL-CCNC: 109 U/L (ref 45–117)
ALT SERPL-CCNC: 8 U/L (ref 12–78)
ANION GAP SERPL CALC-SCNC: 4 MMOL/L (ref 2–12)
AST SERPL-CCNC: 13 U/L (ref 15–37)
BILIRUB SERPL-MCNC: 0.4 MG/DL (ref 0.2–1)
BUN SERPL-MCNC: 26 MG/DL (ref 6–20)
BUN/CREAT SERPL: 29 (ref 12–20)
CALCIUM SERPL-MCNC: 9.4 MG/DL (ref 8.5–10.1)
CHLORIDE SERPL-SCNC: 110 MMOL/L (ref 97–108)
CO2 SERPL-SCNC: 25 MMOL/L (ref 21–32)
COMMENT:: NORMAL
CREAT SERPL-MCNC: 0.89 MG/DL (ref 0.55–1.02)
GLOBULIN SER CALC-MCNC: 5.4 G/DL (ref 2–4)
GLUCOSE SERPL-MCNC: 96 MG/DL (ref 65–100)
LIPASE SERPL-CCNC: 65 U/L (ref 13–75)
POTASSIUM SERPL-SCNC: 4.2 MMOL/L (ref 3.5–5.1)
PROT SERPL-MCNC: 8.4 G/DL (ref 6.4–8.2)
SODIUM SERPL-SCNC: 139 MMOL/L (ref 136–145)
SPECIMEN HOLD: NORMAL

## 2025-02-13 PROCEDURE — 36415 COLL VENOUS BLD VENIPUNCTURE: CPT

## 2025-02-13 PROCEDURE — 85025 COMPLETE CBC W/AUTO DIFF WBC: CPT

## 2025-02-13 PROCEDURE — 99285 EMERGENCY DEPT VISIT HI MDM: CPT

## 2025-02-13 PROCEDURE — 80053 COMPREHEN METABOLIC PANEL: CPT

## 2025-02-13 PROCEDURE — 83690 ASSAY OF LIPASE: CPT

## 2025-02-13 PROCEDURE — 6370000000 HC RX 637 (ALT 250 FOR IP): Performed by: PHYSICIAN ASSISTANT

## 2025-02-13 RX ORDER — ACETAMINOPHEN 325 MG/1
650 TABLET ORAL ONCE
Status: COMPLETED | OUTPATIENT
Start: 2025-02-13 | End: 2025-02-13

## 2025-02-13 RX ADMIN — ACETAMINOPHEN 650 MG: 325 TABLET ORAL at 23:46

## 2025-02-13 ASSESSMENT — PAIN DESCRIPTION - LOCATION
LOCATION: ABDOMEN
LOCATION: ABDOMEN

## 2025-02-13 ASSESSMENT — PAIN SCALES - GENERAL
PAINLEVEL_OUTOF10: 10
PAINLEVEL_OUTOF10: 10

## 2025-02-13 ASSESSMENT — PAIN - FUNCTIONAL ASSESSMENT: PAIN_FUNCTIONAL_ASSESSMENT: 0-10

## 2025-02-14 ENCOUNTER — APPOINTMENT (OUTPATIENT)
Facility: HOSPITAL | Age: 75
End: 2025-02-14
Payer: MEDICARE

## 2025-02-14 VITALS
OXYGEN SATURATION: 100 % | DIASTOLIC BLOOD PRESSURE: 70 MMHG | RESPIRATION RATE: 18 BRPM | BODY MASS INDEX: 17.47 KG/M2 | WEIGHT: 105 LBS | SYSTOLIC BLOOD PRESSURE: 155 MMHG | HEART RATE: 87 BPM | TEMPERATURE: 99.7 F

## 2025-02-14 LAB
AMPHET UR QL SCN: NEGATIVE
APPEARANCE UR: CLEAR
BACTERIA URNS QL MICRO: NEGATIVE /HPF
BARBITURATES UR QL SCN: NEGATIVE
BASOPHILS # BLD: 0 K/UL (ref 0–0.1)
BASOPHILS NFR BLD: 0 % (ref 0–1)
BENZODIAZ UR QL: NEGATIVE
BILIRUB UR QL: NEGATIVE
CANNABINOIDS UR QL SCN: NEGATIVE
COCAINE UR QL SCN: POSITIVE
COLOR UR: NORMAL
DIFFERENTIAL METHOD BLD: ABNORMAL
EOSINOPHIL # BLD: 0.19 K/UL (ref 0–0.4)
EOSINOPHIL NFR BLD: 4 % (ref 0–7)
EPITH CASTS URNS QL MICRO: NORMAL /LPF
ERYTHROCYTE [DISTWIDTH] IN BLOOD BY AUTOMATED COUNT: 17.6 % (ref 11.5–14.5)
GLUCOSE UR STRIP.AUTO-MCNC: NEGATIVE MG/DL
HCT VFR BLD AUTO: 30.5 % (ref 35–47)
HGB BLD-MCNC: 9.2 G/DL (ref 11.5–16)
HGB UR QL STRIP: NEGATIVE
HYALINE CASTS URNS QL MICRO: NORMAL /LPF (ref 0–5)
IMM GRANULOCYTES # BLD AUTO: 0 K/UL
IMM GRANULOCYTES NFR BLD AUTO: 0 %
KETONES UR QL STRIP.AUTO: NEGATIVE MG/DL
LEUKOCYTE ESTERASE UR QL STRIP.AUTO: NEGATIVE
LYMPHOCYTES # BLD: 0.67 K/UL (ref 0.8–3.5)
LYMPHOCYTES NFR BLD: 14 % (ref 12–49)
Lab: ABNORMAL
MCH RBC QN AUTO: 21.3 PG (ref 26–34)
MCHC RBC AUTO-ENTMCNC: 30.2 G/DL (ref 30–36.5)
MCV RBC AUTO: 70.8 FL (ref 80–99)
METHADONE UR QL: NEGATIVE
MONOCYTES # BLD: 0.34 K/UL (ref 0–1)
MONOCYTES NFR BLD: 7 % (ref 5–13)
NEUTS SEG # BLD: 3.6 K/UL (ref 1.8–8)
NEUTS SEG NFR BLD: 75 % (ref 32–75)
NITRITE UR QL STRIP.AUTO: NEGATIVE
NRBC # BLD: 0 K/UL (ref 0–0.01)
NRBC BLD-RTO: 0 PER 100 WBC
OPIATES UR QL: NEGATIVE
PCP UR QL: NEGATIVE
PH UR STRIP: 5.5 (ref 5–8)
PLATELET # BLD AUTO: 128 K/UL (ref 150–400)
PROT UR STRIP-MCNC: NEGATIVE MG/DL
RBC # BLD AUTO: 4.31 M/UL (ref 3.8–5.2)
RBC #/AREA URNS HPF: NORMAL /HPF (ref 0–5)
RBC MORPH BLD: ABNORMAL
SP GR UR REFRACTOMETRY: 1.02 (ref 1–1.03)
UROBILINOGEN UR QL STRIP.AUTO: 1 EU/DL (ref 0.2–1)
WBC # BLD AUTO: 4.8 K/UL (ref 3.6–11)
WBC URNS QL MICRO: NORMAL /HPF (ref 0–4)

## 2025-02-14 PROCEDURE — 6360000004 HC RX CONTRAST MEDICATION: Performed by: EMERGENCY MEDICINE

## 2025-02-14 PROCEDURE — 6360000002 HC RX W HCPCS: Performed by: PHYSICIAN ASSISTANT

## 2025-02-14 PROCEDURE — 80307 DRUG TEST PRSMV CHEM ANLYZR: CPT

## 2025-02-14 PROCEDURE — 96374 THER/PROPH/DIAG INJ IV PUSH: CPT

## 2025-02-14 PROCEDURE — 74177 CT ABD & PELVIS W/CONTRAST: CPT

## 2025-02-14 PROCEDURE — 81001 URINALYSIS AUTO W/SCOPE: CPT

## 2025-02-14 PROCEDURE — 6370000000 HC RX 637 (ALT 250 FOR IP): Performed by: PHYSICIAN ASSISTANT

## 2025-02-14 RX ORDER — KETOROLAC TROMETHAMINE 30 MG/ML
15 INJECTION, SOLUTION INTRAMUSCULAR; INTRAVENOUS
Status: COMPLETED | OUTPATIENT
Start: 2025-02-14 | End: 2025-02-14

## 2025-02-14 RX ORDER — IOPAMIDOL 755 MG/ML
100 INJECTION, SOLUTION INTRAVASCULAR
Status: COMPLETED | OUTPATIENT
Start: 2025-02-14 | End: 2025-02-14

## 2025-02-14 RX ORDER — DICYCLOMINE HYDROCHLORIDE 10 MG/1
20 CAPSULE ORAL ONCE
Status: COMPLETED | OUTPATIENT
Start: 2025-02-14 | End: 2025-02-14

## 2025-02-14 RX ADMIN — KETOROLAC TROMETHAMINE 15 MG: 30 INJECTION, SOLUTION INTRAMUSCULAR; INTRAVENOUS at 03:23

## 2025-02-14 RX ADMIN — DICYCLOMINE HYDROCHLORIDE 20 MG: 10 CAPSULE ORAL at 01:24

## 2025-02-14 RX ADMIN — IOPAMIDOL 100 ML: 755 INJECTION, SOLUTION INTRAVENOUS at 00:33

## 2025-02-14 ASSESSMENT — PAIN SCALES - GENERAL
PAINLEVEL_OUTOF10: 7
PAINLEVEL_OUTOF10: 8

## 2025-02-14 ASSESSMENT — ENCOUNTER SYMPTOMS: ABDOMINAL PAIN: 1

## 2025-02-14 NOTE — ED PROVIDER NOTES
[facility] EMERGENCY DEPARTMENT  EMERGENCY DEPARTMENT ENCOUNTER      Pt Name: Jaz Noble  MRN: [id number]  Birthdate 1950  Date of evaluation: 2/13/2025  Provider: JANETTE Torres    CHIEF COMPLAINT       Chief Complaint   Patient presents with    Abdominal Pain     Pt is coming from home via EMS and states she has abdominal at a 10/10 but was able to eat today, did not vomit and did have a bowel movement.         HISTORY OF PRESENT ILLNESS   (Location/Symptom, Timing/Onset, Context/Setting, Quality, Duration, Modifying Factors, Severity)  Note limiting factors.       74-year-old female presenting to the ED for abdominal pain.  Patient reports that the pain is sharp, cramping, generalized.  Patient notes that she feels a little constipated, although, she did have a bowel movement yesterday.  Has not tried any medication at home.  No vomiting or fever.  No urinary symptoms.  Denies chest pain or shortness of breath.    PMHx and past surgical history: Up-to-date per patient  Social: + smoker.  No alcohol.  No drugs.  Not working.  NKDA    The history is provided by the patient and medical records.         Review of External Medical Records:     Nursing Notes were reviewed.    REVIEW OF SYSTEMS    (2-9 systems for level 4, 10 or more for level 5)     Review of Systems   Gastrointestinal:  Positive for abdominal pain.       Except as noted above the remainder of the review of systems was reviewed and negative.       PAST MEDICAL HISTORY     Past Medical History:   Diagnosis Date    Anxiety     CAD (coronary artery disease)     Heart failure (HCC)     History of abdominal aortic aneurysm (AAA)     Hypertension     Psychiatric disorder     depression    Stroke (HCC)     1970    Stroke (HCC)          SURGICAL HISTORY       Past Surgical History:   Procedure Laterality Date    GYN      tubal ligation    INS NEW/RPLCMT PRM PM W/TRANSV ELTRD ATRIAL&VENT  1/7/2019         PACEMAKER PLACEMENT Left 2018

## 2025-02-14 NOTE — ED NOTES
Pt is alert and oriented to person, place and situation; ambulatory; pt is unaware of the month but knows the year.    Pt endorsed to EMS that she had abdominal pain and was constipated but had a bowel movement today. I asked the pt if she had a hard bowel movement and she said no, it was normal. She denied n/v and stated she did eat today without any problems. Pt states her pain is at 10/10 at the top (epigastric area). Pt denies chest pain or SOB.

## 2025-02-14 NOTE — DISCHARGE INSTRUCTIONS
Return to the ER for new or worsening symptoms.  If you feel that you are constipated, you may start taking MiraLAX, 1-2 capfuls a day to encourage regularity.  Follow-up with your primary care.

## 2025-04-06 ENCOUNTER — HOSPITAL ENCOUNTER (EMERGENCY)
Facility: HOSPITAL | Age: 75
Discharge: HOME | End: 2025-04-06
Attending: STUDENT IN AN ORGANIZED HEALTH CARE EDUCATION/TRAINING PROGRAM
Payer: MEDICARE

## 2025-04-06 ENCOUNTER — APPOINTMENT (OUTPATIENT)
Facility: HOSPITAL | Age: 75
End: 2025-04-06
Payer: MEDICARE

## 2025-04-06 VITALS
HEIGHT: 65 IN | TEMPERATURE: 98.4 F | WEIGHT: 110.23 LBS | OXYGEN SATURATION: 97 % | BODY MASS INDEX: 18.37 KG/M2 | SYSTOLIC BLOOD PRESSURE: 116 MMHG | DIASTOLIC BLOOD PRESSURE: 66 MMHG | RESPIRATION RATE: 18 BRPM | HEART RATE: 72 BPM

## 2025-04-06 DIAGNOSIS — I71.41 PARARENAL ABDOMINAL AORTIC ANEURYSM (AAA) WITHOUT RUPTURE: ICD-10-CM

## 2025-04-06 DIAGNOSIS — R10.9 LEFT FLANK PAIN: Primary | ICD-10-CM

## 2025-04-06 LAB
ALBUMIN SERPL-MCNC: 3.2 G/DL (ref 3.5–5)
ALBUMIN/GLOB SERPL: 0.6 (ref 1.1–2.2)
ALP SERPL-CCNC: 126 U/L (ref 45–117)
ALT SERPL-CCNC: 14 U/L (ref 12–78)
ANION GAP SERPL CALC-SCNC: 6 MMOL/L (ref 2–12)
APPEARANCE UR: CLEAR
AST SERPL-CCNC: 18 U/L (ref 15–37)
BACTERIA URNS QL MICRO: ABNORMAL /HPF
BASOPHILS # BLD: 0.06 K/UL (ref 0–0.1)
BASOPHILS NFR BLD: 2 % (ref 0–1)
BILIRUB SERPL-MCNC: 0.2 MG/DL (ref 0.2–1)
BILIRUB UR QL: NEGATIVE
BUN SERPL-MCNC: 24 MG/DL (ref 6–20)
BUN/CREAT SERPL: 21 (ref 12–20)
CALCIUM SERPL-MCNC: 9.9 MG/DL (ref 8.5–10.1)
CHLORIDE SERPL-SCNC: 105 MMOL/L (ref 97–108)
CO2 SERPL-SCNC: 26 MMOL/L (ref 21–32)
COLOR UR: ABNORMAL
COMMENT:: NORMAL
CREAT SERPL-MCNC: 1.14 MG/DL (ref 0.55–1.02)
D DIMER PPP FEU-MCNC: 31.55 MG/L FEU (ref 0–0.65)
DIFFERENTIAL METHOD BLD: ABNORMAL
EOSINOPHIL # BLD: 0.08 K/UL (ref 0–0.4)
EOSINOPHIL NFR BLD: 3 % (ref 0–7)
EPITH CASTS URNS QL MICRO: ABNORMAL /LPF
ERYTHROCYTE [DISTWIDTH] IN BLOOD BY AUTOMATED COUNT: 17.3 % (ref 11.5–14.5)
GLOBULIN SER CALC-MCNC: 5.6 G/DL (ref 2–4)
GLUCOSE SERPL-MCNC: 83 MG/DL (ref 65–100)
GLUCOSE UR STRIP.AUTO-MCNC: NEGATIVE MG/DL
HCT VFR BLD AUTO: 31.8 % (ref 35–47)
HGB BLD-MCNC: 10.1 G/DL (ref 11.5–16)
HGB UR QL STRIP: NEGATIVE
IMM GRANULOCYTES # BLD AUTO: 0 K/UL
IMM GRANULOCYTES NFR BLD AUTO: 0 %
KETONES UR QL STRIP.AUTO: NEGATIVE MG/DL
LEUKOCYTE ESTERASE UR QL STRIP.AUTO: NEGATIVE
LIPASE SERPL-CCNC: 44 U/L (ref 13–75)
LYMPHOCYTES # BLD: 0.84 K/UL (ref 0.8–3.5)
LYMPHOCYTES NFR BLD: 30 % (ref 12–49)
MCH RBC QN AUTO: 22.2 PG (ref 26–34)
MCHC RBC AUTO-ENTMCNC: 31.8 G/DL (ref 30–36.5)
MCV RBC AUTO: 70 FL (ref 80–99)
MONOCYTES # BLD: 0.28 K/UL (ref 0–1)
MONOCYTES NFR BLD: 10 % (ref 5–13)
NEUTS SEG # BLD: 1.54 K/UL (ref 1.8–8)
NEUTS SEG NFR BLD: 55 % (ref 32–75)
NITRITE UR QL STRIP.AUTO: NEGATIVE
NRBC # BLD: 0 K/UL (ref 0–0.01)
NRBC BLD-RTO: 0 PER 100 WBC
PH UR STRIP: 7 (ref 5–8)
PLATELET # BLD AUTO: 148 K/UL (ref 150–400)
POTASSIUM SERPL-SCNC: 4.4 MMOL/L (ref 3.5–5.1)
PROT SERPL-MCNC: 8.8 G/DL (ref 6.4–8.2)
PROT UR STRIP-MCNC: NEGATIVE MG/DL
RBC # BLD AUTO: 4.54 M/UL (ref 3.8–5.2)
RBC #/AREA URNS HPF: ABNORMAL /HPF (ref 0–5)
RBC MORPH BLD: ABNORMAL
SODIUM SERPL-SCNC: 137 MMOL/L (ref 136–145)
SP GR UR REFRACTOMETRY: 1.02 (ref 1–1.03)
SPECIMEN HOLD: NORMAL
TROPONIN I SERPL HS-MCNC: 22 NG/L (ref 0–51)
URINE CULTURE IF INDICATED: ABNORMAL
UROBILINOGEN UR QL STRIP.AUTO: 1 EU/DL (ref 0.2–1)
WBC # BLD AUTO: 2.8 K/UL (ref 3.6–11)
WBC MORPH BLD: ABNORMAL
WBC URNS QL MICRO: ABNORMAL /HPF (ref 0–4)

## 2025-04-06 PROCEDURE — 99285 EMERGENCY DEPT VISIT HI MDM: CPT

## 2025-04-06 PROCEDURE — 6360000004 HC RX CONTRAST MEDICATION: Performed by: RADIOLOGY

## 2025-04-06 PROCEDURE — 93005 ELECTROCARDIOGRAM TRACING: CPT | Performed by: STUDENT IN AN ORGANIZED HEALTH CARE EDUCATION/TRAINING PROGRAM

## 2025-04-06 PROCEDURE — 83690 ASSAY OF LIPASE: CPT

## 2025-04-06 PROCEDURE — 6360000002 HC RX W HCPCS: Performed by: STUDENT IN AN ORGANIZED HEALTH CARE EDUCATION/TRAINING PROGRAM

## 2025-04-06 PROCEDURE — 71275 CT ANGIOGRAPHY CHEST: CPT

## 2025-04-06 PROCEDURE — 84484 ASSAY OF TROPONIN QUANT: CPT

## 2025-04-06 PROCEDURE — 96375 TX/PRO/DX INJ NEW DRUG ADDON: CPT

## 2025-04-06 PROCEDURE — 85379 FIBRIN DEGRADATION QUANT: CPT

## 2025-04-06 PROCEDURE — 81001 URINALYSIS AUTO W/SCOPE: CPT

## 2025-04-06 PROCEDURE — 80053 COMPREHEN METABOLIC PANEL: CPT

## 2025-04-06 PROCEDURE — 85025 COMPLETE CBC W/AUTO DIFF WBC: CPT

## 2025-04-06 PROCEDURE — 96374 THER/PROPH/DIAG INJ IV PUSH: CPT

## 2025-04-06 RX ORDER — METOPROLOL SUCCINATE 50 MG/1
50 TABLET, EXTENDED RELEASE ORAL DAILY
Qty: 30 TABLET | Refills: 0 | Status: SHIPPED | OUTPATIENT
Start: 2025-04-06

## 2025-04-06 RX ORDER — LABETALOL HYDROCHLORIDE 5 MG/ML
10 INJECTION, SOLUTION INTRAVENOUS
Status: COMPLETED | OUTPATIENT
Start: 2025-04-06 | End: 2025-04-06

## 2025-04-06 RX ORDER — MORPHINE SULFATE 4 MG/ML
4 INJECTION, SOLUTION INTRAMUSCULAR; INTRAVENOUS
Refills: 0 | Status: COMPLETED | OUTPATIENT
Start: 2025-04-06 | End: 2025-04-06

## 2025-04-06 RX ORDER — IOPAMIDOL 755 MG/ML
100 INJECTION, SOLUTION INTRAVASCULAR
Status: COMPLETED | OUTPATIENT
Start: 2025-04-06 | End: 2025-04-06

## 2025-04-06 RX ORDER — LISINOPRIL 5 MG/1
5 TABLET ORAL DAILY
Qty: 30 TABLET | Refills: 0 | Status: SHIPPED | OUTPATIENT
Start: 2025-04-06

## 2025-04-06 RX ADMIN — IOPAMIDOL 80 ML: 755 INJECTION, SOLUTION INTRAVENOUS at 20:38

## 2025-04-06 RX ADMIN — MORPHINE SULFATE 4 MG: 4 INJECTION INTRAVENOUS at 18:51

## 2025-04-06 RX ADMIN — Medication 10 MG: at 20:55

## 2025-04-06 ASSESSMENT — PAIN DESCRIPTION - LOCATION: LOCATION: FLANK

## 2025-04-06 ASSESSMENT — PAIN DESCRIPTION - PAIN TYPE: TYPE: ACUTE PAIN

## 2025-04-06 ASSESSMENT — PAIN DESCRIPTION - FREQUENCY: FREQUENCY: CONTINUOUS

## 2025-04-06 ASSESSMENT — PAIN SCALES - GENERAL: PAINLEVEL_OUTOF10: 10

## 2025-04-06 ASSESSMENT — PAIN DESCRIPTION - ONSET: ONSET: ON-GOING

## 2025-04-06 ASSESSMENT — PAIN DESCRIPTION - DESCRIPTORS: DESCRIPTORS: SHOOTING

## 2025-04-06 ASSESSMENT — PAIN DESCRIPTION - ORIENTATION: ORIENTATION: LEFT

## 2025-04-06 ASSESSMENT — PAIN - FUNCTIONAL ASSESSMENT: PAIN_FUNCTIONAL_ASSESSMENT: ACTIVITIES ARE NOT PREVENTED

## 2025-04-06 NOTE — ED TRIAGE NOTES
Pt arrived via EMS with CC 10/10 sharp left flank pain since yesterday. Seen yesterday at an ER and sent home with pantoprazole but stated pain unchanged. Stroke scale negative. Aox4. NSR on 12 Lead.     Hx: AAA '18

## 2025-04-06 NOTE — ED NOTES
Pt states that she was seen at [facility] last night for the same complaint and discharged to home. She denied getting any medications. States that she couldn't go back there today because they are on diverson. She states that she lives in a care home home with her boyfriend.

## 2025-04-07 LAB
EKG ATRIAL RATE: 79 BPM
EKG DIAGNOSIS: NORMAL
EKG P AXIS: 84 DEGREES
EKG P-R INTERVAL: 134 MS
EKG Q-T INTERVAL: 382 MS
EKG QRS DURATION: 68 MS
EKG QTC CALCULATION (BAZETT): 438 MS
EKG R AXIS: -39 DEGREES
EKG T AXIS: 74 DEGREES
EKG VENTRICULAR RATE: 79 BPM

## 2025-04-07 NOTE — ED PROVIDER NOTES
[address] [address] [phone]    As needed, If symptoms worsen    Moshe Agustin MD  [address] [place] [id number] [phone]    Schedule an appointment as soon as possible for a visit         DISCHARGE MEDICATIONS:  Discharge Medication List as of 4/6/2025  9:25 PM        START taking these medications    Details   metoprolol succinate (TOPROL XL) 50 MG extended release tablet Take 1 tablet by mouth daily, Disp-30 tablet, R-0Normal      lisinopril (PRINIVIL;ZESTRIL) 5 MG tablet Take 1 tablet by mouth daily, Disp-30 tablet, R-0Normal               (Please note that portions of this note were completed with a transcription program.  Efforts were made to edit the dictations but occasionally words are mis-transcribed.)    Britta Law MD (electronically signed)  Emergency Attending Physician           Britta Law MD  04/09/25 0398

## 2025-05-12 ENCOUNTER — HOSPITAL ENCOUNTER (EMERGENCY)
Facility: HOSPITAL | Age: 75
Discharge: HOME | End: 2025-05-12
Attending: STUDENT IN AN ORGANIZED HEALTH CARE EDUCATION/TRAINING PROGRAM
Payer: MEDICARE

## 2025-05-12 ENCOUNTER — APPOINTMENT (OUTPATIENT)
Facility: HOSPITAL | Age: 75
End: 2025-05-12
Payer: MEDICARE

## 2025-05-12 VITALS
BODY MASS INDEX: 17.41 KG/M2 | TEMPERATURE: 99.5 F | DIASTOLIC BLOOD PRESSURE: 75 MMHG | RESPIRATION RATE: 14 BRPM | SYSTOLIC BLOOD PRESSURE: 138 MMHG | WEIGHT: 104.5 LBS | HEART RATE: 74 BPM | HEIGHT: 65 IN | OXYGEN SATURATION: 97 %

## 2025-05-12 DIAGNOSIS — Z86.79 HISTORY OF ABDOMINAL AORTIC ANEURYSM (AAA): ICD-10-CM

## 2025-05-12 DIAGNOSIS — R10.84 GENERALIZED ABDOMINAL PAIN: Primary | ICD-10-CM

## 2025-05-12 DIAGNOSIS — D61.818 PANCYTOPENIA (HCC): ICD-10-CM

## 2025-05-12 DIAGNOSIS — E87.6 HYPOKALEMIA: ICD-10-CM

## 2025-05-12 LAB
ALBUMIN SERPL-MCNC: 2.5 G/DL (ref 3.5–5)
ALBUMIN/GLOB SERPL: 0.6 (ref 1.1–2.2)
ALP SERPL-CCNC: 95 U/L (ref 45–117)
ALT SERPL-CCNC: 6 U/L (ref 12–78)
AMPHET UR QL SCN: NEGATIVE
ANION GAP SERPL CALC-SCNC: 4 MMOL/L (ref 2–12)
APPEARANCE UR: CLEAR
AST SERPL-CCNC: 13 U/L (ref 15–37)
BACTERIA URNS QL MICRO: NEGATIVE /HPF
BARBITURATES UR QL SCN: NEGATIVE
BASOPHILS # BLD: 0 K/UL (ref 0–0.1)
BASOPHILS NFR BLD: 0 % (ref 0–1)
BENZODIAZ UR QL: NEGATIVE
BILIRUB SERPL-MCNC: 0.2 MG/DL (ref 0.2–1)
BILIRUB UR QL: NEGATIVE
BUN SERPL-MCNC: 28 MG/DL (ref 6–20)
BUN/CREAT SERPL: 18 (ref 12–20)
CALCIUM SERPL-MCNC: 8.2 MG/DL (ref 8.5–10.1)
CANNABINOIDS UR QL SCN: NEGATIVE
CHLORIDE SERPL-SCNC: 111 MMOL/L (ref 97–108)
CO2 SERPL-SCNC: 25 MMOL/L (ref 21–32)
COCAINE UR QL SCN: POSITIVE
COLOR UR: NORMAL
CREAT SERPL-MCNC: 1.52 MG/DL (ref 0.55–1.02)
DIFFERENTIAL METHOD BLD: ABNORMAL
EOSINOPHIL # BLD: 0.12 K/UL (ref 0–0.4)
EOSINOPHIL NFR BLD: 4 % (ref 0–7)
EPITH CASTS URNS QL MICRO: NORMAL /LPF
ERYTHROCYTE [DISTWIDTH] IN BLOOD BY AUTOMATED COUNT: 15.9 % (ref 11.5–14.5)
GLOBULIN SER CALC-MCNC: 4.3 G/DL (ref 2–4)
GLUCOSE SERPL-MCNC: 82 MG/DL (ref 65–100)
GLUCOSE UR STRIP.AUTO-MCNC: NEGATIVE MG/DL
HCT VFR BLD AUTO: 26.2 % (ref 35–47)
HGB BLD-MCNC: 8.4 G/DL (ref 11.5–16)
HGB UR QL STRIP: NEGATIVE
HYALINE CASTS URNS QL MICRO: NORMAL /LPF (ref 0–5)
IMM GRANULOCYTES # BLD AUTO: 0 K/UL
IMM GRANULOCYTES NFR BLD AUTO: 0 %
KETONES UR QL STRIP.AUTO: NEGATIVE MG/DL
LEUKOCYTE ESTERASE UR QL STRIP.AUTO: NEGATIVE
LIPASE SERPL-CCNC: 56 U/L (ref 13–75)
LYMPHOCYTES # BLD: 0.87 K/UL (ref 0.8–3.5)
LYMPHOCYTES NFR BLD: 29 % (ref 12–49)
Lab: ABNORMAL
MCH RBC QN AUTO: 22.5 PG (ref 26–34)
MCHC RBC AUTO-ENTMCNC: 32.1 G/DL (ref 30–36.5)
MCV RBC AUTO: 70.2 FL (ref 80–99)
METHADONE UR QL: NEGATIVE
MONOCYTES # BLD: 0.15 K/UL (ref 0–1)
MONOCYTES NFR BLD: 5 % (ref 5–13)
NEUTS SEG # BLD: 1.86 K/UL (ref 1.8–8)
NEUTS SEG NFR BLD: 62 % (ref 32–75)
NITRITE UR QL STRIP.AUTO: NEGATIVE
NRBC # BLD: 0.02 K/UL (ref 0–0.01)
NRBC BLD-RTO: 0.7 PER 100 WBC
OPIATES UR QL: POSITIVE
PCP UR QL: NEGATIVE
PH UR STRIP: 6.5 (ref 5–8)
PLATELET # BLD AUTO: 135 K/UL (ref 150–400)
PMV BLD AUTO: 10.6 FL (ref 8.9–12.9)
POTASSIUM SERPL-SCNC: 3.3 MMOL/L (ref 3.5–5.1)
PROT SERPL-MCNC: 6.8 G/DL (ref 6.4–8.2)
PROT UR STRIP-MCNC: NEGATIVE MG/DL
RBC # BLD AUTO: 3.73 M/UL (ref 3.8–5.2)
RBC #/AREA URNS HPF: NORMAL /HPF (ref 0–5)
RBC MORPH BLD: ABNORMAL
SODIUM SERPL-SCNC: 140 MMOL/L (ref 136–145)
SP GR UR REFRACTOMETRY: >1.03
URINE CULTURE IF INDICATED: NORMAL
UROBILINOGEN UR QL STRIP.AUTO: 1 EU/DL (ref 0.2–1)
WBC # BLD AUTO: 3 K/UL (ref 3.6–11)
WBC MORPH BLD: ABNORMAL
WBC URNS QL MICRO: NORMAL /HPF (ref 0–4)

## 2025-05-12 PROCEDURE — 96374 THER/PROPH/DIAG INJ IV PUSH: CPT

## 2025-05-12 PROCEDURE — 6360000004 HC RX CONTRAST MEDICATION: Performed by: STUDENT IN AN ORGANIZED HEALTH CARE EDUCATION/TRAINING PROGRAM

## 2025-05-12 PROCEDURE — 83690 ASSAY OF LIPASE: CPT

## 2025-05-12 PROCEDURE — 6360000002 HC RX W HCPCS: Performed by: STUDENT IN AN ORGANIZED HEALTH CARE EDUCATION/TRAINING PROGRAM

## 2025-05-12 PROCEDURE — 99285 EMERGENCY DEPT VISIT HI MDM: CPT

## 2025-05-12 PROCEDURE — 81001 URINALYSIS AUTO W/SCOPE: CPT

## 2025-05-12 PROCEDURE — 6370000000 HC RX 637 (ALT 250 FOR IP): Performed by: STUDENT IN AN ORGANIZED HEALTH CARE EDUCATION/TRAINING PROGRAM

## 2025-05-12 PROCEDURE — 85025 COMPLETE CBC W/AUTO DIFF WBC: CPT

## 2025-05-12 PROCEDURE — 80053 COMPREHEN METABOLIC PANEL: CPT

## 2025-05-12 PROCEDURE — 93005 ELECTROCARDIOGRAM TRACING: CPT

## 2025-05-12 PROCEDURE — 74174 CTA ABD&PLVS W/CONTRAST: CPT

## 2025-05-12 PROCEDURE — 80307 DRUG TEST PRSMV CHEM ANLYZR: CPT

## 2025-05-12 RX ORDER — MORPHINE SULFATE 4 MG/ML
4 INJECTION, SOLUTION INTRAMUSCULAR; INTRAVENOUS
Refills: 0 | Status: COMPLETED | OUTPATIENT
Start: 2025-05-12 | End: 2025-05-12

## 2025-05-12 RX ORDER — POTASSIUM CHLORIDE 750 MG/1
20 TABLET, EXTENDED RELEASE ORAL ONCE
Status: COMPLETED | OUTPATIENT
Start: 2025-05-12 | End: 2025-05-12

## 2025-05-12 RX ORDER — PANTOPRAZOLE SODIUM 40 MG/1
40 TABLET, DELAYED RELEASE ORAL
Qty: 30 TABLET | Refills: 0 | Status: SHIPPED | OUTPATIENT
Start: 2025-05-12

## 2025-05-12 RX ORDER — IOPAMIDOL 755 MG/ML
100 INJECTION, SOLUTION INTRAVASCULAR
Status: COMPLETED | OUTPATIENT
Start: 2025-05-12 | End: 2025-05-12

## 2025-05-12 RX ORDER — SIMETHICONE 40MG/0.6ML
15 SUSPENSION, DROPS(FINAL DOSAGE FORM)(ML) ORAL EVERY 6 HOURS PRN
Qty: 355 ML | Refills: 0 | Status: SHIPPED | OUTPATIENT
Start: 2025-05-12

## 2025-05-12 RX ADMIN — MORPHINE SULFATE 4 MG: 4 INJECTION INTRAVENOUS at 01:03

## 2025-05-12 RX ADMIN — POTASSIUM CHLORIDE 20 MEQ: 750 TABLET, FILM COATED, EXTENDED RELEASE ORAL at 03:38

## 2025-05-12 RX ADMIN — LIDOCAINE HYDROCHLORIDE 40 ML: 20 SOLUTION ORAL at 03:38

## 2025-05-12 RX ADMIN — IOPAMIDOL 100 ML: 755 INJECTION, SOLUTION INTRAVENOUS at 02:30

## 2025-05-12 ASSESSMENT — PAIN DESCRIPTION - ONSET: ONSET: ON-GOING

## 2025-05-12 ASSESSMENT — PAIN SCALES - GENERAL
PAINLEVEL_OUTOF10: 8
PAINLEVEL_OUTOF10: 8

## 2025-05-12 ASSESSMENT — PAIN DESCRIPTION - ORIENTATION
ORIENTATION: OTHER (COMMENT)
ORIENTATION: LEFT;LOWER

## 2025-05-12 ASSESSMENT — PAIN DESCRIPTION - DESCRIPTORS: DESCRIPTORS: SHARP

## 2025-05-12 ASSESSMENT — PAIN - FUNCTIONAL ASSESSMENT
PAIN_FUNCTIONAL_ASSESSMENT: 0-10
PAIN_FUNCTIONAL_ASSESSMENT: ACTIVITIES ARE NOT PREVENTED

## 2025-05-12 ASSESSMENT — PAIN DESCRIPTION - FREQUENCY: FREQUENCY: CONTINUOUS

## 2025-05-12 ASSESSMENT — PAIN DESCRIPTION - LOCATION
LOCATION: ABDOMEN
LOCATION: ABDOMEN

## 2025-05-12 ASSESSMENT — PAIN DESCRIPTION - PAIN TYPE: TYPE: ACUTE PAIN

## 2025-05-12 NOTE — ED TRIAGE NOTES
Patient arrives via EMS with abdominal pain x1 day. States pain started after eating a lot of candy today. Diagnosed with AAA without rupture.

## 2025-05-12 NOTE — ED NOTES
Pt was able to take GI cocktail and PO K+ without incident. Pt also given crackers and juice per her request. Pt ambulated to restroom without difficulty, urine spec obtained and sent.

## 2025-05-16 LAB
EKG ATRIAL RATE: 78 BPM
EKG DIAGNOSIS: NORMAL
EKG P AXIS: 72 DEGREES
EKG P-R INTERVAL: 142 MS
EKG Q-T INTERVAL: 374 MS
EKG QRS DURATION: 76 MS
EKG QTC CALCULATION (BAZETT): 426 MS
EKG R AXIS: -36 DEGREES
EKG T AXIS: 60 DEGREES
EKG VENTRICULAR RATE: 78 BPM

## 2025-05-16 NOTE — ED PROVIDER NOTES
[facility] EMERGENCY DEPARTMENT  EMERGENCY DEPARTMENT ENCOUNTER      Pt Name: Jaz Noble  MRN: [id number]  Birthdate 1950  Date of evaluation: 5/12/2025  Provider: Britta Law MD    CHIEF COMPLAINT       Chief Complaint   Patient presents with    Abdominal Pain       ALLERGIES     Patient has no known allergies.    ENCOUNTER     HISTORY OF PRESENT ILLNESS:    A 74-year-old female with a past medical history significant for hypertension, heart failure, stroke and depression, as well as a surgical history of pacemaker placement, was brought to the Emergency Department via EMS. The history was provided by EMS. The patient presents with severe generalized abdominal pain that began after consuming a large amount of candy. She denies associated symptoms such as nausea, vomiting, diarrhea, constipation, fevers, or chills. Despite a history of cocaine use, she denies recent use. Additionally, she has not been taking her prescribed medications for hypertension. EMS reported patient expressed concerns of a possible rupture of her abdominal aortic aneurysm prior to arrival.    PAST MEDICAL HISTORY:    - Hypertension    - Heart failure    - Diabetes    - Depression    PAST SURGICAL HISTORY:    - Pacemaker placement    SOCIAL HISTORY:    - Denies recent cocaine use despite history of use    PHYSICAL EXAM:    General Appearance: The patient is in mild distress due to abdominal pain.    Vital Signs: Blood pressure is elevated at 149/112; other vital signs are within normal limits.    HEENT: Normocephalic, atraumatic.    Neck: Supple, no jugular venous distention.    Cardiovascular: Regular rate and rhythm, no murmurs, gallops, or rubs.    Respiratory: Clear to auscultation bilaterally, no wheezes, rales, or rhonchi.    Abdomen: Generalized tenderness, hyperactive bowel sounds, no guarding or rebound tenderness.    Musculoskeletal: No deformities or tenderness.    Neurological: Alert and oriented, no focal

## 2025-07-22 PROBLEM — I71.40 AAA (ABDOMINAL AORTIC ANEURYSM): Status: ACTIVE | Noted: 2025-07-22

## 2025-08-07 ENCOUNTER — OFFICE VISIT (OUTPATIENT)
Facility: CLINIC | Age: 75
End: 2025-08-07

## 2025-08-07 VITALS
HEART RATE: 88 BPM | HEIGHT: 65 IN | WEIGHT: 79.9 LBS | SYSTOLIC BLOOD PRESSURE: 113 MMHG | TEMPERATURE: 96.3 F | BODY MASS INDEX: 13.31 KG/M2 | RESPIRATION RATE: 16 BRPM | DIASTOLIC BLOOD PRESSURE: 71 MMHG | OXYGEN SATURATION: 98 %

## 2025-08-07 DIAGNOSIS — R64 CACHEXIA: ICD-10-CM

## 2025-08-07 DIAGNOSIS — Z78.9 TRANSITION OF CARE: Primary | ICD-10-CM

## 2025-08-07 DIAGNOSIS — Z98.890 HISTORY OF ABDOMINAL AORTIC ANEURYSM (AAA) REPAIR: ICD-10-CM

## 2025-08-07 PROBLEM — F14.10 COCAINE ABUSE (HCC): Status: RESOLVED | Noted: 2019-01-06 | Resolved: 2025-08-07

## 2025-08-07 RX ORDER — LISINOPRIL 5 MG/1
5 TABLET ORAL DAILY
COMMUNITY
Start: 2025-03-03 | End: 2026-03-03

## 2025-08-07 RX ORDER — ATORVASTATIN CALCIUM 40 MG/1
40 TABLET, FILM COATED ORAL DAILY
COMMUNITY
Start: 2025-06-24 | End: 2025-10-22

## 2025-08-07 RX ORDER — CARVEDILOL 12.5 MG/1
12.5 TABLET ORAL 2 TIMES DAILY
COMMUNITY
Start: 2025-06-24 | End: 2025-10-22

## 2025-08-07 RX ORDER — ATORVASTATIN CALCIUM 80 MG/1
80 TABLET, FILM COATED ORAL DAILY
COMMUNITY
Start: 2025-03-03 | End: 2026-03-03

## 2025-08-07 RX ORDER — POLYETHYLENE GLYCOL 3350 17 G/17G
17 POWDER, FOR SOLUTION ORAL DAILY PRN
COMMUNITY
Start: 2025-07-25 | End: 2025-08-24

## 2025-08-07 RX ORDER — AMLODIPINE BESYLATE 10 MG/1
10 TABLET ORAL DAILY
COMMUNITY
Start: 2025-03-03 | End: 2026-03-03

## 2025-08-07 SDOH — ECONOMIC STABILITY: FOOD INSECURITY: WITHIN THE PAST 12 MONTHS, THE FOOD YOU BOUGHT JUST DIDN'T LAST AND YOU DIDN'T HAVE MONEY TO GET MORE.: NEVER TRUE

## 2025-08-07 SDOH — ECONOMIC STABILITY: FOOD INSECURITY: WITHIN THE PAST 12 MONTHS, YOU WORRIED THAT YOUR FOOD WOULD RUN OUT BEFORE YOU GOT MONEY TO BUY MORE.: NEVER TRUE

## 2025-08-07 ASSESSMENT — PATIENT HEALTH QUESTIONNAIRE - PHQ9
SUM OF ALL RESPONSES TO PHQ QUESTIONS 1-9: 0
1. LITTLE INTEREST OR PLEASURE IN DOING THINGS: NOT AT ALL
SUM OF ALL RESPONSES TO PHQ QUESTIONS 1-9: 0
2. FEELING DOWN, DEPRESSED OR HOPELESS: NOT AT ALL

## 2025-08-08 RX ORDER — MULTIVITAMIN WITH IRON
1 TABLET ORAL DAILY
COMMUNITY